# Patient Record
Sex: FEMALE | Race: WHITE | NOT HISPANIC OR LATINO | URBAN - NONMETROPOLITAN AREA
[De-identification: names, ages, dates, MRNs, and addresses within clinical notes are randomized per-mention and may not be internally consistent; named-entity substitution may affect disease eponyms.]

---

## 2021-12-30 PROCEDURE — 88305 TISSUE EXAM BY PATHOLOGIST: CPT | Performed by: SURGERY

## 2022-01-03 ENCOUNTER — LAB REQUISITION (OUTPATIENT)
Dept: LAB | Facility: HOSPITAL | Age: 82
End: 2022-01-03

## 2022-01-03 DIAGNOSIS — Z00.00 ENCOUNTER FOR GENERAL ADULT MEDICAL EXAMINATION WITHOUT ABNORMAL FINDINGS: ICD-10-CM

## 2022-01-05 LAB
CYTO UR: NORMAL
LAB AP CASE REPORT: NORMAL
LAB AP CLINICAL INFORMATION: NORMAL
PATH REPORT.FINAL DX SPEC: NORMAL
PATH REPORT.GROSS SPEC: NORMAL

## 2023-09-21 ENCOUNTER — APPOINTMENT (OUTPATIENT)
Dept: CT IMAGING | Facility: HOSPITAL | Age: 83
DRG: 558 | End: 2023-09-21
Payer: MEDICARE

## 2023-09-21 ENCOUNTER — APPOINTMENT (OUTPATIENT)
Dept: ULTRASOUND IMAGING | Facility: HOSPITAL | Age: 83
DRG: 558 | End: 2023-09-21
Payer: MEDICARE

## 2023-09-21 ENCOUNTER — HOSPITAL ENCOUNTER (INPATIENT)
Facility: HOSPITAL | Age: 83
LOS: 4 days | Discharge: HOME HEALTH | DRG: 558 | End: 2023-09-27
Attending: STUDENT IN AN ORGANIZED HEALTH CARE EDUCATION/TRAINING PROGRAM | Admitting: FAMILY MEDICINE
Payer: MEDICARE

## 2023-09-21 DIAGNOSIS — Z74.09 IMPAIRED FUNCTIONAL MOBILITY, BALANCE, GAIT, AND ENDURANCE: ICD-10-CM

## 2023-09-21 DIAGNOSIS — Z78.9 IMPAIRED MOBILITY AND ADLS: ICD-10-CM

## 2023-09-21 DIAGNOSIS — S22.080A COMPRESSION FRACTURE OF T12 VERTEBRA, INITIAL ENCOUNTER: ICD-10-CM

## 2023-09-21 DIAGNOSIS — T79.6XXA TRAUMATIC RHABDOMYOLYSIS, INITIAL ENCOUNTER: Primary | ICD-10-CM

## 2023-09-21 DIAGNOSIS — Z74.09 IMPAIRED MOBILITY AND ADLS: ICD-10-CM

## 2023-09-21 PROBLEM — M62.82 RHABDOMYOLYSIS: Status: ACTIVE | Noted: 2023-09-21

## 2023-09-21 LAB
ALBUMIN SERPL-MCNC: 2.7 G/DL (ref 3.5–5.2)
ALBUMIN/GLOB SERPL: 0.8 G/DL
ALP SERPL-CCNC: 65 U/L (ref 39–117)
ALT SERPL W P-5'-P-CCNC: 41 U/L (ref 1–33)
ANION GAP SERPL CALCULATED.3IONS-SCNC: 15 MMOL/L (ref 5–15)
AST SERPL-CCNC: 72 U/L (ref 1–32)
B PARAPERT DNA SPEC QL NAA+PROBE: NOT DETECTED
B PERT DNA SPEC QL NAA+PROBE: NOT DETECTED
BACTERIA UR QL AUTO: ABNORMAL /HPF
BASOPHILS # BLD AUTO: 0.06 10*3/MM3 (ref 0–0.2)
BASOPHILS NFR BLD AUTO: 0.4 % (ref 0–1.5)
BILIRUB SERPL-MCNC: 0.6 MG/DL (ref 0–1.2)
BILIRUB UR QL STRIP: NEGATIVE
BUN SERPL-MCNC: 31 MG/DL (ref 8–23)
BUN/CREAT SERPL: 23.3 (ref 7–25)
C PNEUM DNA NPH QL NAA+NON-PROBE: NOT DETECTED
CALCIUM SPEC-SCNC: 8.3 MG/DL (ref 8.6–10.5)
CHLORIDE SERPL-SCNC: 104 MMOL/L (ref 98–107)
CK SERPL-CCNC: 2264 U/L (ref 20–180)
CLARITY UR: ABNORMAL
CO2 SERPL-SCNC: 21 MMOL/L (ref 22–29)
COLOR UR: YELLOW
CREAT SERPL-MCNC: 1.33 MG/DL (ref 0.57–1)
D-LACTATE SERPL-SCNC: 2 MMOL/L (ref 0.5–2)
DEPRECATED RDW RBC AUTO: 39.9 FL (ref 37–54)
EGFRCR SERPLBLD CKD-EPI 2021: 40 ML/MIN/1.73
EOSINOPHIL # BLD AUTO: 0.04 10*3/MM3 (ref 0–0.4)
EOSINOPHIL NFR BLD AUTO: 0.3 % (ref 0.3–6.2)
ERYTHROCYTE [DISTWIDTH] IN BLOOD BY AUTOMATED COUNT: 13.6 % (ref 12.3–15.4)
FLUAV SUBTYP SPEC NAA+PROBE: NOT DETECTED
FLUBV RNA ISLT QL NAA+PROBE: NOT DETECTED
GLOBULIN UR ELPH-MCNC: 3.4 GM/DL
GLUCOSE SERPL-MCNC: 122 MG/DL (ref 65–99)
GLUCOSE UR STRIP-MCNC: NEGATIVE MG/DL
HADV DNA SPEC NAA+PROBE: NOT DETECTED
HCOV 229E RNA SPEC QL NAA+PROBE: NOT DETECTED
HCOV HKU1 RNA SPEC QL NAA+PROBE: NOT DETECTED
HCOV NL63 RNA SPEC QL NAA+PROBE: NOT DETECTED
HCOV OC43 RNA SPEC QL NAA+PROBE: NOT DETECTED
HCT VFR BLD AUTO: 31 % (ref 34–46.6)
HGB BLD-MCNC: 10.7 G/DL (ref 12–15.9)
HGB UR QL STRIP.AUTO: ABNORMAL
HMPV RNA NPH QL NAA+NON-PROBE: NOT DETECTED
HPIV1 RNA ISLT QL NAA+PROBE: NOT DETECTED
HPIV2 RNA SPEC QL NAA+PROBE: NOT DETECTED
HPIV3 RNA NPH QL NAA+PROBE: NOT DETECTED
HPIV4 P GENE NPH QL NAA+PROBE: NOT DETECTED
HYALINE CASTS UR QL AUTO: ABNORMAL /LPF
IMM GRANULOCYTES # BLD AUTO: 0.18 10*3/MM3 (ref 0–0.05)
IMM GRANULOCYTES NFR BLD AUTO: 1.2 % (ref 0–0.5)
KETONES UR QL STRIP: ABNORMAL
LEUKOCYTE ESTERASE UR QL STRIP.AUTO: ABNORMAL
LYMPHOCYTES # BLD AUTO: 2.27 10*3/MM3 (ref 0.7–3.1)
LYMPHOCYTES NFR BLD AUTO: 14.7 % (ref 19.6–45.3)
M PNEUMO IGG SER IA-ACNC: NOT DETECTED
MAGNESIUM SERPL-MCNC: 1.9 MG/DL (ref 1.6–2.4)
MCH RBC QN AUTO: 27.9 PG (ref 26.6–33)
MCHC RBC AUTO-ENTMCNC: 34.5 G/DL (ref 31.5–35.7)
MCV RBC AUTO: 80.7 FL (ref 79–97)
MONOCYTES # BLD AUTO: 1.31 10*3/MM3 (ref 0.1–0.9)
MONOCYTES NFR BLD AUTO: 8.5 % (ref 5–12)
NEUTROPHILS NFR BLD AUTO: 11.63 10*3/MM3 (ref 1.7–7)
NEUTROPHILS NFR BLD AUTO: 74.9 % (ref 42.7–76)
NITRITE UR QL STRIP: NEGATIVE
NRBC BLD AUTO-RTO: 0 /100 WBC (ref 0–0.2)
NT-PROBNP SERPL-MCNC: 2180 PG/ML (ref 0–1800)
PH UR STRIP.AUTO: 5.5 [PH] (ref 5–9)
PLATELET # BLD AUTO: 419 10*3/MM3 (ref 140–450)
PMV BLD AUTO: 8.7 FL (ref 6–12)
POTASSIUM SERPL-SCNC: 3.4 MMOL/L (ref 3.5–5.2)
PROT SERPL-MCNC: 6.1 G/DL (ref 6–8.5)
PROT UR QL STRIP: ABNORMAL
RBC # BLD AUTO: 3.84 10*6/MM3 (ref 3.77–5.28)
RBC # UR STRIP: ABNORMAL /HPF
REF LAB TEST METHOD: ABNORMAL
RHINOVIRUS RNA SPEC NAA+PROBE: NOT DETECTED
RSV RNA NPH QL NAA+NON-PROBE: NOT DETECTED
SARS-COV-2 RNA NPH QL NAA+NON-PROBE: NOT DETECTED
SODIUM SERPL-SCNC: 140 MMOL/L (ref 136–145)
SP GR UR STRIP: 1.04 (ref 1–1.03)
SQUAMOUS #/AREA URNS HPF: ABNORMAL /HPF
TROPONIN T SERPL HS-MCNC: 44 NG/L
UROBILINOGEN UR QL STRIP: ABNORMAL
WBC # UR STRIP: ABNORMAL /HPF
WBC NRBC COR # BLD: 15.49 10*3/MM3 (ref 3.4–10.8)

## 2023-09-21 PROCEDURE — 93010 ELECTROCARDIOGRAM REPORT: CPT | Performed by: INTERNAL MEDICINE

## 2023-09-21 PROCEDURE — 71260 CT THORAX DX C+: CPT

## 2023-09-21 PROCEDURE — 36415 COLL VENOUS BLD VENIPUNCTURE: CPT | Performed by: STUDENT IN AN ORGANIZED HEALTH CARE EDUCATION/TRAINING PROGRAM

## 2023-09-21 PROCEDURE — 72128 CT CHEST SPINE W/O DYE: CPT

## 2023-09-21 PROCEDURE — 0202U NFCT DS 22 TRGT SARS-COV-2: CPT | Performed by: FAMILY MEDICINE

## 2023-09-21 PROCEDURE — 81001 URINALYSIS AUTO W/SCOPE: CPT | Performed by: STUDENT IN AN ORGANIZED HEALTH CARE EDUCATION/TRAINING PROGRAM

## 2023-09-21 PROCEDURE — 87086 URINE CULTURE/COLONY COUNT: CPT

## 2023-09-21 PROCEDURE — G0378 HOSPITAL OBSERVATION PER HR: HCPCS

## 2023-09-21 PROCEDURE — 74177 CT ABD & PELVIS W/CONTRAST: CPT

## 2023-09-21 PROCEDURE — 82550 ASSAY OF CK (CPK): CPT | Performed by: STUDENT IN AN ORGANIZED HEALTH CARE EDUCATION/TRAINING PROGRAM

## 2023-09-21 PROCEDURE — 70450 CT HEAD/BRAIN W/O DYE: CPT

## 2023-09-21 PROCEDURE — 70486 CT MAXILLOFACIAL W/O DYE: CPT

## 2023-09-21 PROCEDURE — 83605 ASSAY OF LACTIC ACID: CPT | Performed by: STUDENT IN AN ORGANIZED HEALTH CARE EDUCATION/TRAINING PROGRAM

## 2023-09-21 PROCEDURE — 93005 ELECTROCARDIOGRAM TRACING: CPT | Performed by: STUDENT IN AN ORGANIZED HEALTH CARE EDUCATION/TRAINING PROGRAM

## 2023-09-21 PROCEDURE — 83735 ASSAY OF MAGNESIUM: CPT | Performed by: STUDENT IN AN ORGANIZED HEALTH CARE EDUCATION/TRAINING PROGRAM

## 2023-09-21 PROCEDURE — 87040 BLOOD CULTURE FOR BACTERIA: CPT

## 2023-09-21 PROCEDURE — 72131 CT LUMBAR SPINE W/O DYE: CPT

## 2023-09-21 PROCEDURE — 93880 EXTRACRANIAL BILAT STUDY: CPT

## 2023-09-21 PROCEDURE — 72125 CT NECK SPINE W/O DYE: CPT

## 2023-09-21 PROCEDURE — 83880 ASSAY OF NATRIURETIC PEPTIDE: CPT | Performed by: STUDENT IN AN ORGANIZED HEALTH CARE EDUCATION/TRAINING PROGRAM

## 2023-09-21 PROCEDURE — 25510000001 IOPAMIDOL 61 % SOLUTION: Performed by: STUDENT IN AN ORGANIZED HEALTH CARE EDUCATION/TRAINING PROGRAM

## 2023-09-21 PROCEDURE — 99285 EMERGENCY DEPT VISIT HI MDM: CPT

## 2023-09-21 PROCEDURE — 85025 COMPLETE CBC W/AUTO DIFF WBC: CPT | Performed by: STUDENT IN AN ORGANIZED HEALTH CARE EDUCATION/TRAINING PROGRAM

## 2023-09-21 PROCEDURE — 80053 COMPREHEN METABOLIC PANEL: CPT | Performed by: STUDENT IN AN ORGANIZED HEALTH CARE EDUCATION/TRAINING PROGRAM

## 2023-09-21 PROCEDURE — 84484 ASSAY OF TROPONIN QUANT: CPT | Performed by: STUDENT IN AN ORGANIZED HEALTH CARE EDUCATION/TRAINING PROGRAM

## 2023-09-21 PROCEDURE — 25010000002 HEPARIN (PORCINE) PER 1000 UNITS

## 2023-09-21 RX ORDER — ONDANSETRON 2 MG/ML
4 INJECTION INTRAMUSCULAR; INTRAVENOUS EVERY 6 HOURS PRN
Status: DISCONTINUED | OUTPATIENT
Start: 2023-09-21 | End: 2023-09-27 | Stop reason: HOSPADM

## 2023-09-21 RX ORDER — AMLODIPINE BESYLATE 10 MG/1
10 TABLET ORAL
Status: DISCONTINUED | OUTPATIENT
Start: 2023-09-21 | End: 2023-09-27 | Stop reason: HOSPADM

## 2023-09-21 RX ORDER — LISINOPRIL 40 MG/1
40 TABLET ORAL DAILY
COMMUNITY

## 2023-09-21 RX ORDER — FAMOTIDINE 40 MG/1
40 TABLET, FILM COATED ORAL DAILY
Status: DISCONTINUED | OUTPATIENT
Start: 2023-09-21 | End: 2023-09-27 | Stop reason: HOSPADM

## 2023-09-21 RX ORDER — SODIUM CHLORIDE 0.9 % (FLUSH) 0.9 %
10 SYRINGE (ML) INJECTION AS NEEDED
Status: DISCONTINUED | OUTPATIENT
Start: 2023-09-21 | End: 2023-09-27 | Stop reason: HOSPADM

## 2023-09-21 RX ORDER — SODIUM CHLORIDE 0.9 % (FLUSH) 0.9 %
10 SYRINGE (ML) INJECTION EVERY 12 HOURS SCHEDULED
Status: DISCONTINUED | OUTPATIENT
Start: 2023-09-21 | End: 2023-09-27 | Stop reason: HOSPADM

## 2023-09-21 RX ORDER — ONDANSETRON 4 MG/1
4 TABLET, FILM COATED ORAL EVERY 6 HOURS PRN
Status: DISCONTINUED | OUTPATIENT
Start: 2023-09-21 | End: 2023-09-27 | Stop reason: HOSPADM

## 2023-09-21 RX ORDER — NITROGLYCERIN 0.4 MG/1
0.4 TABLET SUBLINGUAL
Status: DISCONTINUED | OUTPATIENT
Start: 2023-09-21 | End: 2023-09-27 | Stop reason: HOSPADM

## 2023-09-21 RX ORDER — AMOXICILLIN 250 MG
2 CAPSULE ORAL 2 TIMES DAILY
Status: DISCONTINUED | OUTPATIENT
Start: 2023-09-21 | End: 2023-09-27 | Stop reason: HOSPADM

## 2023-09-21 RX ORDER — SODIUM CHLORIDE 9 MG/ML
100 INJECTION, SOLUTION INTRAVENOUS CONTINUOUS
Status: DISCONTINUED | OUTPATIENT
Start: 2023-09-21 | End: 2023-09-22

## 2023-09-21 RX ORDER — BISACODYL 10 MG
10 SUPPOSITORY, RECTAL RECTAL DAILY PRN
Status: DISCONTINUED | OUTPATIENT
Start: 2023-09-21 | End: 2023-09-27 | Stop reason: HOSPADM

## 2023-09-21 RX ORDER — DILTIAZEM HYDROCHLORIDE 5 MG/ML
10 INJECTION INTRAVENOUS ONCE
Status: COMPLETED | OUTPATIENT
Start: 2023-09-21 | End: 2023-09-21

## 2023-09-21 RX ORDER — POLYETHYLENE GLYCOL 3350 17 G/17G
17 POWDER, FOR SOLUTION ORAL DAILY PRN
Status: DISCONTINUED | OUTPATIENT
Start: 2023-09-21 | End: 2023-09-27 | Stop reason: HOSPADM

## 2023-09-21 RX ORDER — HEPARIN SODIUM 5000 [USP'U]/ML
5000 INJECTION, SOLUTION INTRAVENOUS; SUBCUTANEOUS EVERY 12 HOURS SCHEDULED
Status: DISCONTINUED | OUTPATIENT
Start: 2023-09-21 | End: 2023-09-22

## 2023-09-21 RX ORDER — POTASSIUM CHLORIDE 20 MEQ/1
40 TABLET, EXTENDED RELEASE ORAL EVERY 4 HOURS
Status: COMPLETED | OUTPATIENT
Start: 2023-09-21 | End: 2023-09-22

## 2023-09-21 RX ORDER — BISACODYL 5 MG/1
5 TABLET, DELAYED RELEASE ORAL DAILY PRN
Status: DISCONTINUED | OUTPATIENT
Start: 2023-09-21 | End: 2023-09-27 | Stop reason: HOSPADM

## 2023-09-21 RX ORDER — SODIUM CHLORIDE 9 MG/ML
40 INJECTION, SOLUTION INTRAVENOUS AS NEEDED
Status: DISCONTINUED | OUTPATIENT
Start: 2023-09-21 | End: 2023-09-27 | Stop reason: HOSPADM

## 2023-09-21 RX ADMIN — AMLODIPINE BESYLATE 10 MG: 10 TABLET ORAL at 20:33

## 2023-09-21 RX ADMIN — POTASSIUM CHLORIDE 40 MEQ: 20 TABLET, EXTENDED RELEASE ORAL at 21:13

## 2023-09-21 RX ADMIN — DILTIAZEM HYDROCHLORIDE 10 MG: 5 INJECTION INTRAVENOUS at 13:27

## 2023-09-21 RX ADMIN — SODIUM CHLORIDE 5 MG/HR: 900 INJECTION, SOLUTION INTRAVENOUS at 16:56

## 2023-09-21 RX ADMIN — SODIUM CHLORIDE 1000 ML: 9 INJECTION, SOLUTION INTRAVENOUS at 16:54

## 2023-09-21 RX ADMIN — FAMOTIDINE 40 MG: 40 TABLET, FILM COATED ORAL at 20:33

## 2023-09-21 RX ADMIN — IOPAMIDOL 100 ML: 612 INJECTION, SOLUTION INTRAVENOUS at 15:54

## 2023-09-21 RX ADMIN — HEPARIN SODIUM 5000 UNITS: 5000 INJECTION INTRAVENOUS; SUBCUTANEOUS at 20:34

## 2023-09-21 RX ADMIN — SODIUM CHLORIDE 100 ML/HR: 9 INJECTION, SOLUTION INTRAVENOUS at 20:31

## 2023-09-21 RX ADMIN — SODIUM CHLORIDE 1000 ML: 9 INJECTION, SOLUTION INTRAVENOUS at 13:25

## 2023-09-21 NOTE — ED PROVIDER NOTES
Subjective   History of Present Illness  83-year-old male comes to the ER from home after being found on the floor of her bathroom.  Patient does not remember what happened.  Family thinks she was on the ground for 2 nights.  She is complaining of left hip pain.     History provided by:  Patient and relative  History limited by: Poor historian.   used: No      Review of Systems   Reason unable to perform ROS: Poor historian.   Musculoskeletal:  Negative for back pain and neck pain.   Neurological:  Negative for weakness, numbness and headaches.     Past Medical History:   Diagnosis Date    Diabetes mellitus     Hypertension     Stroke        No Known Allergies    Past Surgical History:   Procedure Laterality Date    HYSTERECTOMY         History reviewed. No pertinent family history.    Social History     Socioeconomic History    Marital status:    Tobacco Use    Smoking status: Former     Types: Cigarettes    Smokeless tobacco: Never   Substance and Sexual Activity    Alcohol use: Never    Drug use: Never    Sexual activity: Not Currently           Objective   Vitals:    09/21/23 1339 09/21/23 1412 09/21/23 1525 09/21/23 1655   BP:  147/81  156/83   BP Location:  Left arm  Left arm   Patient Position:  Lying  Lying   Pulse: 116 118 110 117   Resp:  20  20   Temp:       TempSrc:       SpO2: 100% 100% 99% 100%   Weight:       Height:           Physical Exam  Vitals and nursing note reviewed.   Constitutional:       General: She is not in acute distress.     Appearance: She is well-developed. She is not ill-appearing, toxic-appearing or diaphoretic.   Pulmonary:      Effort: Pulmonary effort is normal. No accessory muscle usage or respiratory distress.   Chest:      Chest wall: No tenderness.   Abdominal:      Palpations: Abdomen is soft.      Tenderness: There is no abdominal tenderness (deep palpation). There is no guarding or rebound.   Musculoskeletal:         General: Tenderness present.  No swelling, deformity or signs of injury. Normal range of motion.      Cervical back: No tenderness or bony tenderness.      Thoracic back: No tenderness or bony tenderness.      Lumbar back: No tenderness or bony tenderness.   Skin:     General: Skin is warm and dry.      Capillary Refill: Capillary refill takes less than 2 seconds.      Findings: Bruising and ecchymosis present.   Neurological:      Mental Status: She is alert and oriented to person, place, and time.      Sensory: No sensory deficit.      Coordination: Coordination normal.       ECG 12 Lead      Date/Time: 9/21/2023 5:10 PM  Performed by: Reji Lopez MD  Authorized by: Reji Lopez MD   Interpreted by physician  Rhythm: atrial fibrillation  Rate: tachycardic  BPM: 114  QRS axis: normal  ST segment elevation noted on lead: none.  Clinical impression: abnormal ECG             ED Course      Results for orders placed or performed during the hospital encounter of 09/21/23   Comprehensive Metabolic Panel    Specimen: Blood   Result Value Ref Range    Glucose 122 (H) 65 - 99 mg/dL    BUN 31 (H) 8 - 23 mg/dL    Creatinine 1.33 (H) 0.57 - 1.00 mg/dL    Sodium 140 136 - 145 mmol/L    Potassium 3.4 (L) 3.5 - 5.2 mmol/L    Chloride 104 98 - 107 mmol/L    CO2 21.0 (L) 22.0 - 29.0 mmol/L    Calcium 8.3 (L) 8.6 - 10.5 mg/dL    Total Protein 6.1 6.0 - 8.5 g/dL    Albumin 2.7 (L) 3.5 - 5.2 g/dL    ALT (SGPT) 41 (H) 1 - 33 U/L    AST (SGOT) 72 (H) 1 - 32 U/L    Alkaline Phosphatase 65 39 - 117 U/L    Total Bilirubin 0.6 0.0 - 1.2 mg/dL    Globulin 3.4 gm/dL    A/G Ratio 0.8 g/dL    BUN/Creatinine Ratio 23.3 7.0 - 25.0    Anion Gap 15.0 5.0 - 15.0 mmol/L    eGFR 40.0 (L) >60.0 mL/min/1.73   BNP    Specimen: Blood   Result Value Ref Range    proBNP 2,180.0 (H) 0.0 - 1,800.0 pg/mL   Single High Sensitivity Troponin T    Specimen: Blood   Result Value Ref Range    HS Troponin T 44 (H) <10 ng/L   Lactic Acid, Plasma    Specimen: Blood   Result Value Ref  Range    Lactate 2.0 0.5 - 2.0 mmol/L   CK    Specimen: Blood   Result Value Ref Range    Creatine Kinase 2,264 (H) 20 - 180 U/L   Magnesium    Specimen: Blood   Result Value Ref Range    Magnesium 1.9 1.6 - 2.4 mg/dL   CBC Auto Differential    Specimen: Blood   Result Value Ref Range    WBC 15.49 (H) 3.40 - 10.80 10*3/mm3    RBC 3.84 3.77 - 5.28 10*6/mm3    Hemoglobin 10.7 (L) 12.0 - 15.9 g/dL    Hematocrit 31.0 (L) 34.0 - 46.6 %    MCV 80.7 79.0 - 97.0 fL    MCH 27.9 26.6 - 33.0 pg    MCHC 34.5 31.5 - 35.7 g/dL    RDW 13.6 12.3 - 15.4 %    RDW-SD 39.9 37.0 - 54.0 fl    MPV 8.7 6.0 - 12.0 fL    Platelets 419 140 - 450 10*3/mm3    Neutrophil % 74.9 42.7 - 76.0 %    Lymphocyte % 14.7 (L) 19.6 - 45.3 %    Monocyte % 8.5 5.0 - 12.0 %    Eosinophil % 0.3 0.3 - 6.2 %    Basophil % 0.4 0.0 - 1.5 %    Immature Grans % 1.2 (H) 0.0 - 0.5 %    Neutrophils, Absolute 11.63 (H) 1.70 - 7.00 10*3/mm3    Lymphocytes, Absolute 2.27 0.70 - 3.10 10*3/mm3    Monocytes, Absolute 1.31 (H) 0.10 - 0.90 10*3/mm3    Eosinophils, Absolute 0.04 0.00 - 0.40 10*3/mm3    Basophils, Absolute 0.06 0.00 - 0.20 10*3/mm3    Immature Grans, Absolute 0.18 (H) 0.00 - 0.05 10*3/mm3    nRBC 0.0 0.0 - 0.2 /100 WBC   ECG 12 Lead Other; fall   Result Value Ref Range    QT Interval 330 ms    QTC Interval 454 ms     CT Chest With Contrast Diagnostic   Preliminary Result   1.  Probable acute on chronic superior endplate fracture of T12.  If there is   further need to date the fracture, MRI can be obtained to assess for the   presence of edema.   2.  No additional acute process within the thoracic spine.            CT Abdomen Pelvis With Contrast   Preliminary Result   Negative for acute trauma within the abdomen or pelvis.         CT Thoracic Spine Without Contrast   Preliminary Result   1.  Probable acute on chronic superior endplate fracture of T12.  If there is   further need to date the fracture, MRI can be obtained to assess for the   presence of edema.    2.  No additional compression deformities are seen within the thoracic spine.            CT Lumbar Spine Without Contrast   Preliminary Result   Acute on chronic appearing superior endplate compression deformity   of T12.  No evidence of fracture within the lumbar spine.            CT Head Without Contrast   Final Result      CT Maxillofacial Without Contrast   Final Result      CT Cervical Spine Without Contrast   Final Result                                             Medical Decision Making  Vital signs are stable, afebrile.  Labs obtained significant for CK of 2300, creatinine of 1.3, troponin of 44.  Man scan CT imaging obtained which is negative except for an acute on chronic mild T12 fracture.  Brace ordered.  EKG shows A-fib RVR which the patient does not have a history of.  She is on a diltiazem drip.  IVF bolus was given.  Discussed with the on-call hospitalist who agrees to admit.    Problems Addressed:  Compression fracture of T12 vertebra, initial encounter: complicated acute illness or injury  Traumatic rhabdomyolysis, initial encounter: complicated acute illness or injury    Amount and/or Complexity of Data Reviewed  Labs: ordered.  Radiology: ordered.  ECG/medicine tests: ordered and independent interpretation performed.    Risk  Prescription drug management.  Decision regarding hospitalization.        Final diagnoses:   Traumatic rhabdomyolysis, initial encounter   Compression fracture of T12 vertebra, initial encounter       ED Disposition  ED Disposition       ED Disposition   Decision to Admit    Condition   --    Comment   Level of Care: Stepdown [25]   Diagnosis: Rhabdomyolysis [728.88.ICD-9-CM]   Admitting Physician: STACY KAUR [[id number]]   Attending Physician: STACY KAUR [[id number]]                 No follow-up provider specified.       Medication List      No changes were made to your prescriptions during this visit.            Reji Lopez MD  09/21/23 8236

## 2023-09-21 NOTE — ED NOTES
Nursing report ED to floor  Eloisa Chang  83 y.o.  female    HPI:   Chief Complaint   Patient presents with    Fall    Hip Injury    Knee Pain       Admitting doctor:   Alex Salcedo MD    Consulting provider(s):  Consults       No orders found from 8/23/2023 to 9/22/2023.             Admitting diagnosis:   The primary encounter diagnosis was Traumatic rhabdomyolysis, initial encounter. A diagnosis of Compression fracture of T12 vertebra, initial encounter was also pertinent to this visit.    Code status:   Current Code Status       Date Active Code Status Order ID Comments User Context       Not on file            Allergies:   Patient has no known allergies.    Intake and Output    Intake/Output Summary (Last 24 hours) at 9/21/2023 1748  Last data filed at 9/21/2023 1548  Gross per 24 hour   Intake 1000 ml   Output --   Net 1000 ml       Weight:       09/21/23  1214   Weight: 66.7 kg (147 lb)       Most recent vitals:   Vitals:    09/21/23 1412 09/21/23 1525 09/21/23 1655 09/21/23 1730   BP: 147/81  156/83 170/90   BP Location: Left arm  Left arm    Patient Position: Lying  Lying    Pulse: 118 110 117 106   Resp: 20  20    Temp:       TempSrc:       SpO2: 100% 99% 100% 100%   Weight:       Height:         Oxygen Therapy: Room air     Active LDAs/IV Access:   Lines, Drains & Airways       Active LDAs       Name Placement date Placement time Site Days    Peripheral IV 09/21/23 1321 Right;Lower Forearm 09/21/23  1321  Forearm  less than 1                    Labs (abnormal labs have a star):   Labs Reviewed   COMPREHENSIVE METABOLIC PANEL - Abnormal; Notable for the following components:       Result Value    Glucose 122 (*)     BUN 31 (*)     Creatinine 1.33 (*)     Potassium 3.4 (*)     CO2 21.0 (*)     Calcium 8.3 (*)     Albumin 2.7 (*)     ALT (SGPT) 41 (*)     AST (SGOT) 72 (*)     eGFR 40.0 (*)     All other components within normal limits    Narrative:     GFR Normal >60  Chronic Kidney Disease  <60  Kidney Failure <15    The GFR formula is only valid for adults with stable renal function between ages 18 and 70.   BNP (IN-HOUSE) - Abnormal; Notable for the following components:    proBNP 2,180.0 (*)     All other components within normal limits    Narrative:     Among patients with dyspnea, NT-proBNP is highly sensitive for the detection of acute congestive heart failure. In addition NT-proBNP of <300 pg/ml effectively rules out acute congestive heart failure with 99% negative predictive value.     SINGLE HSTROPONIN T - Abnormal; Notable for the following components:    HS Troponin T 44 (*)     All other components within normal limits    Narrative:     High Sensitive Troponin T Reference Range:  <10.0 ng/L- Negative Female for AMI  <15.0 ng/L- Negative Male for AMI  >=10 - Abnormal Female indicating possible myocardial injury.  >=15 - Abnormal Male indicating possible myocardial injury.   Clinicians would have to utilize clinical acumen, EKG, Troponin, and serial changes to determine if it is an Acute Myocardial Infarction or myocardial injury due to an underlying chronic condition.        CK - Abnormal; Notable for the following components:    Creatine Kinase 2,264 (*)     All other components within normal limits   CBC WITH AUTO DIFFERENTIAL - Abnormal; Notable for the following components:    WBC 15.49 (*)     Hemoglobin 10.7 (*)     Hematocrit 31.0 (*)     Lymphocyte % 14.7 (*)     Immature Grans % 1.2 (*)     Neutrophils, Absolute 11.63 (*)     Monocytes, Absolute 1.31 (*)     Immature Grans, Absolute 0.18 (*)     All other components within normal limits   LACTIC ACID, PLASMA - Normal   MAGNESIUM - Normal   URINALYSIS W/ MICROSCOPIC IF INDICATED (NO CULTURE)   CBC AND DIFFERENTIAL    Narrative:     The following orders were created for panel order CBC & Differential.  Procedure                               Abnormality         Status                     ---------                                -----------         ------                     CBC Auto Differential[[id number]]        Abnormal            Final result                 Please view results for these tests on the individual orders.       Meds given in ED:   Medications   dilTIAZem (CARDIZEM) 100 mg in 100 mL NS infusion (ADV) (5 mg/hr Intravenous New Bag 9/21/23 1656)   sodium chloride 0.9 % bolus 1,000 mL (0 mL Intravenous Stopped 9/21/23 1548)   dilTIAZem (CARDIZEM) injection 10 mg (10 mg Intravenous Given 9/21/23 1327)   sodium chloride 0.9 % bolus 1,000 mL (1,000 mL Intravenous New Bag 9/21/23 1654)   iopamidol (ISOVUE-300) 61 % injection 100 mL (100 mL Intravenous Given 9/21/23 1554)     dilTIAZem, 5-15 mg/hr, Last Rate: 5 mg/hr (09/21/23 1656)         NIH Stroke Scale:       Isolation/Infection(s):  No active isolations   No active infections     COVID Testing  Collected No  Resulted N/A    Nursing report ED to floor:  Mental status: AOx4  Ambulatory status: Assist x2  Precautions: Fall    ED nurse phone [phone]

## 2023-09-21 NOTE — H&P
[facility] Medicine Admission      Date of Admission: 9/21/2023      Primary Care Physician: Provider, No Known      Chief Complaint: found on the floor in the bathroom    HPI:83-year-old male comes to the ER from home after being found on the floor of her bathroom. Spoke to her sons Bruce and Bao, they found her on the bathroom floor, maybe after 36 hours.  Patient does not remember what happened, now more oriented and denies chest pain, no shortness of breath, no fever, no urinary or GI symptoms. Family thinks she was on the ground for 2 nights. She is complaining of left hip pain. PMH: Type 2 diabetes, hyperlipidemia, essential hypertension, osteoporosis.  Past Medical History:   Diagnosis Date    Diabetes mellitus     Hypertension     Stroke       Past Surgical History:   Procedure Laterality Date    HYSTERECTOMY        Social History     Socioeconomic History    Marital status:    Tobacco Use    Smoking status: Former     Types: Cigarettes    Smokeless tobacco: Never   Substance and Sexual Activity    Alcohol use: Never    Drug use: Never    Sexual activity: Not Currently    History reviewed. No pertinent family history.   Prior to Admission medications    Medication Sig Start Date End Date Taking? Authorizing Provider   lisinopril (PRINIVIL,ZESTRIL) 40 MG tablet Take 1 tablet by mouth Daily.    Provider, MD Patti      Concurrent Medical History:  has a past medical history of Diabetes mellitus, Hypertension, and Stroke.    Past Surgical History:  has a past surgical history that includes Hysterectomy.    Family History: family history is not on file.     Social History:  reports that she has quit smoking. Her smoking use included cigarettes. She has never used smokeless tobacco. She reports that she does not drink alcohol and does not use drugs.    Allergies: No Known Allergies    Medications:   Prior to Admission medications    Medication  Sig Start Date End Date Taking? Authorizing Provider   lisinopril (PRINIVIL,ZESTRIL) 40 MG tablet Take 1 tablet by mouth Daily.    Provider, MD Patti     I have utilized all available immediate resources to obtain, update, or review the patient's current medications (including all prescriptions, over-the-counter products, herbals, cannabis/cannabidiol products, and vitamin/mineral/dietary (nutritional) supplements).      Review of Systems:  Review of Systems   Constitutional:  Negative for activity change, appetite change, chills, diaphoresis and fatigue.   HENT:  Negative for congestion, ear discharge, hearing loss, rhinorrhea, sinus pain, sneezing and sore throat.    Eyes:  Negative for photophobia, discharge and visual disturbance.   Respiratory:  Negative for cough, chest tightness, shortness of breath and wheezing.    Cardiovascular:  Negative for chest pain and palpitations.   Gastrointestinal:  Negative for abdominal distention, abdominal pain, blood in stool, diarrhea, nausea and vomiting.   Endocrine: Negative for cold intolerance, heat intolerance, polydipsia, polyphagia and polyuria.   Genitourinary:  Negative for dysuria, flank pain, hematuria and urgency.   Musculoskeletal:  Negative for arthralgias, joint swelling and myalgias.   Skin:  Negative for color change.   Allergic/Immunologic: Negative for food allergies.   Neurological:  Negative for dizziness, seizures, syncope, speech difficulty, weakness and headaches.   Hematological:  Negative for adenopathy. Does not bruise/bleed easily.   Psychiatric/Behavioral:  Negative for confusion, hallucinations, self-injury and suicidal ideas. The patient is not nervous/anxious.     Otherwise complete ROS is negative except as mentioned above.    Physical Exam:   Temp:  [97.7 °F (36.5 °C)] 97.7 °F (36.5 °C)  Heart Rate:  [106-138] 106  Resp:  [20] 20  BP: (147-170)/(81-90) 170/90  Physical Exam  Constitutional:       Appearance: Normal appearance.   HENT:       Head: Normocephalic and atraumatic.      Right Ear: Tympanic membrane normal.      Left Ear: Tympanic membrane normal.      Nose: Nose normal.      Mouth/Throat:      Mouth: Mucous membranes are moist.   Eyes:      Pupils: Pupils are equal, round, and reactive to light.   Cardiovascular:      Rate and Rhythm: Normal rate and regular rhythm.      Pulses: Normal pulses.      Heart sounds: Normal heart sounds.   Pulmonary:      Effort: Pulmonary effort is normal.      Breath sounds: Normal breath sounds.   Abdominal:      General: Abdomen is flat. Bowel sounds are normal.      Palpations: Abdomen is soft.   Musculoskeletal:         General: Normal range of motion.      Cervical back: Normal range of motion and neck supple.   Skin:     General: Skin is warm and dry.      Capillary Refill: Capillary refill takes less than 2 seconds.      Comments: Bruises in hips, knees and forehead   Neurological:      General: No focal deficit present.      Mental Status: She is alert and oriented to person, place, and time.   Psychiatric:         Mood and Affect: Mood normal.         Behavior: Behavior normal.         Thought Content: Thought content normal.         Judgment: Judgment normal.         Results Reviewed:  I have personally reviewed current lab, radiology, and data and agree with results.  Lab Results (last 24 hours)       Procedure Component Value Units Date/Time    CK [[id number]]  (Abnormal) Collected: 09/21/23 1421    Specimen: Blood Updated: 09/21/23 1506     Creatine Kinase 2,264 U/L     Comprehensive Metabolic Panel [[id number]]  (Abnormal) Collected: 09/21/23 1421    Specimen: Blood Updated: 09/21/23 1451     Glucose 122 mg/dL      BUN 31 mg/dL      Creatinine 1.33 mg/dL      Sodium 140 mmol/L      Potassium 3.4 mmol/L      Chloride 104 mmol/L      CO2 21.0 mmol/L      Calcium 8.3 mg/dL      Total Protein 6.1 g/dL      Albumin 2.7 g/dL      ALT (SGPT) 41 U/L      AST (SGOT) 72 U/L      Alkaline Phosphatase 65  U/L      Total Bilirubin 0.6 mg/dL      Globulin 3.4 gm/dL      A/G Ratio 0.8 g/dL      BUN/Creatinine Ratio 23.3     Anion Gap 15.0 mmol/L      eGFR 40.0 mL/min/1.73     Narrative:      GFR Normal >60  Chronic Kidney Disease <60  Kidney Failure <15    The GFR formula is only valid for adults with stable renal function between ages 18 and 70.    Single High Sensitivity Troponin T [[id number]]  (Abnormal) Collected: 09/21/23 1421    Specimen: Blood Updated: 09/21/23 1451     HS Troponin T 44 ng/L     Narrative:      High Sensitive Troponin T Reference Range:  <10.0 ng/L- Negative Female for AMI  <15.0 ng/L- Negative Male for AMI  >=10 - Abnormal Female indicating possible myocardial injury.  >=15 - Abnormal Male indicating possible myocardial injury.   Clinicians would have to utilize clinical acumen, EKG, Troponin, and serial changes to determine if it is an Acute Myocardial Infarction or myocardial injury due to an underlying chronic condition.         Magnesium [[id number]]  (Normal) Collected: 09/21/23 1421    Specimen: Blood Updated: 09/21/23 1451     Magnesium 1.9 mg/dL     BNP [[id number]]  (Abnormal) Collected: 09/21/23 1421    Specimen: Blood Updated: 09/21/23 1449     proBNP 2,180.0 pg/mL     Narrative:      Among patients with dyspnea, NT-proBNP is highly sensitive for the detection of acute congestive heart failure. In addition NT-proBNP of <300 pg/ml effectively rules out acute congestive heart failure with 99% negative predictive value.      Lactic Acid, Plasma [[id number]]  (Normal) Collected: 09/21/23 1421    Specimen: Blood Updated: 09/21/23 1445     Lactate 2.0 mmol/L     CBC & Differential [[id number]]  (Abnormal) Collected: 09/21/23 1421    Specimen: Blood Updated: 09/21/23 1432    Narrative:      The following orders were created for panel order CBC & Differential.  Procedure                               Abnormality         Status                     ---------                                -----------         ------                     CBC Auto Differential[[id number]]        Abnormal            Final result                 Please view results for these tests on the individual orders.    CBC Auto Differential [[id number]]  (Abnormal) Collected: 09/21/23 1421    Specimen: Blood Updated: 09/21/23 1432     WBC 15.49 10*3/mm3      RBC 3.84 10*6/mm3      Hemoglobin 10.7 g/dL      Hematocrit 31.0 %      MCV 80.7 fL      MCH 27.9 pg      MCHC 34.5 g/dL      RDW 13.6 %      RDW-SD 39.9 fl      MPV 8.7 fL      Platelets 419 10*3/mm3      Neutrophil % 74.9 %      Lymphocyte % 14.7 %      Monocyte % 8.5 %      Eosinophil % 0.3 %      Basophil % 0.4 %      Immature Grans % 1.2 %      Neutrophils, Absolute 11.63 10*3/mm3      Lymphocytes, Absolute 2.27 10*3/mm3      Monocytes, Absolute 1.31 10*3/mm3      Eosinophils, Absolute 0.04 10*3/mm3      Basophils, Absolute 0.06 10*3/mm3      Immature Grans, Absolute 0.18 10*3/mm3      nRBC 0.0 /100 WBC           Imaging Results (Last 24 Hours)       Procedure Component Value Units Date/Time    CT Lumbar Spine Without Contrast [[id number]] Collected: 09/21/23 1646     Updated: 09/21/23 1647    Narrative:      INDICATION:  Trauma.    COMPARISON:  None relevant.    TECHNIQUE:  Helical CT of the lumbar spine was performed without intravenous contrast.  Multiplanar reformations were provided.    FINDINGS:  There is an acute on chronic appearing fracture of T12.  Within the lumbar spine  alignment appears to be intact.  No additional fractures are seen.  Mild  multilevel spondylosis and facet arthrosis.  Diffuse vascular calcification.      Impression:      Acute on chronic appearing superior endplate compression deformity  of T12.  No evidence of fracture within the lumbar spine.        CT Chest With Contrast Diagnostic [[id number]] Collected: 09/21/23 1641     Updated: 09/21/23 1646    Narrative:      INDICATION:  Trauma.    COMPARISON:  None relevant.    TECHNIQUE:  Helical CT of  the chest was performed with intravenous contrast.  Multiplanar  reformations were provided.    FINDINGS:  Cardiac size is not enlarged.  Diffuse vascular calcification.  No pleural  effusion or pneumothorax.  No worrisome pulmonary nodules.  No acute osseous  trauma is visualized.  Chronic right-sided rib fractures.  T12 fracture.      Impression:      1.  Probable acute on chronic superior endplate fracture of T12.  If there is  further need to date the fracture, MRI can be obtained to assess for the  presence of edema.  2.  No additional acute process within the thoracic spine.        CT Thoracic Spine Without Contrast [[id number]] Collected: 09/21/23 1643     Updated: 09/21/23 1645    Narrative:      INDICATION:  Trauma.    COMPARISON:  None relevant.    TECHNIQUE:  Helical CT of the thoracic spine was performed without intravenous contrast.  Multiplanar reformations were provided.    FINDINGS:  There is height loss of T12 which is age indeterminate, but appears to be  chronic.  There may be an acute component.  Remainder of the thoracic spine  shows no acute trauma.  Alignment appears to be intact.  There is multilevel  spondylosis.      Impression:      1.  Probable acute on chronic superior endplate fracture of T12.  If there is  further need to date the fracture, MRI can be obtained to assess for the  presence of edema.  2.  No additional compression deformities are seen within the thoracic spine.        CT Abdomen Pelvis With Contrast [[id number]] Collected: 09/21/23 1642     Updated: 09/21/23 1643    Narrative:      INDICATION:  Trauma.    COMPARISON:  None relevant.    TECHNIQUE:  Helical CT of the abdomen and pelvis was performed with intravenous contrast.  Multiplanar reformations were provided.    FINDINGS:  Lung bases are clear.    Liver, spleen, adrenals, pancreas stomach and kidneys show no acute process.  No  free air or free fluid.  Normal volume of colonic stool.  Cholelithiasis.      Impression:       Negative for acute trauma within the abdomen or pelvis.      CT Cervical Spine Without Contrast [[id number]] Collected: 09/21/23 1612     Updated: 09/21/23 1638    Narrative:      CT CERVICAL SPINE WITHOUT CONTRAST:    INDICATION:  Trauma.    TECHNIQUE:  Axial images from the base of the skull through the thoracic inlet were  performed followed by 2D multiplanar reformats.    FINDINGS:  There is degenerative change and osteopenia.  Study is negative for fracture or  other acute abnormality.    SUMMARY:  No acute findings.    CT Head Without Contrast [[id number]] Collected: 09/21/23 1611     Updated: 09/21/23 1638    Narrative:      CT HEAD WITHOUT CONTRAST:    INDICATION:  Trauma.    TECHNIQUE:  Axial images were performed from the calvarium through the base of the skull  followed by 2D multiplanar reformats.    FINDINGS:  There is age-related atrophy.  No hemorrhage or other acute abnormality seen.  No focal abnormal densities.    SUMMARY:  No acute findings.    CT Maxillofacial Without Contrast [[id number]] Collected: 09/21/23 1613     Updated: 09/21/23 1638    Narrative:      INDICATION:  Trauma.    TECHNIQUE:  Axial images from the base of the skull through the mandible were performed  followed by 2D multiplanar reformats.    FINDINGS:  No fracture or other acute abnormality demonstrated.  Paraspinal sinuses are  clear.    SUMMARY:  Negative.              Assessment:    Active Hospital Problems    Diagnosis     **Rhabdomyolysis              Assessment and Plan: 83 years old male patient found on the floor after 2 days, no syncope, maybe had dizzines, will do complete cardiac work up. Has renal failure, rhabdomyolysis. Hypokalemia. Leucocytosis.  Problems:  Atrial fibrillation, Cardizem drip  Rhabdomyolysis, IVF, am labs  Renal failure, prerenal, IVF  High WBC, to follow, urine and blood cultures  Hypokalemia, electrolyte replacement protocol  Old T12 and right ribs fractures, underlined osteoporosis  Frequent  falls  Medical Decision Making  Number and Complexity of problems: 4  Differential Diagnosis: none    Conditions and Status:        Condition is improving.     MDM Data  External documents reviewed: previous medical records  My EKG interpretation: atrial fibrillation  My CT interpretation: chest right ribs fractures  Tests considered but not ordered: none     Decision rules/scores evaluated (example [id number], Wells, etc): 7     Discussed with: sophie Barrera and Bao Chang     Treatment Plan  See above    Care Planning  Shared decision making: sophie  Code status and discussions: full code    Disposition  Social Determinants of Health that impact treatment or disposition: none  I expect the patient to be discharged to home  in 3 days.      I confirmed that the patient's Advance Care Plan is present, code status is documented, or surrogate decision maker is listed in the patient's medical record.    I discussed the patient's findings and my recommendations with: sophie      This document has been electronically signed by Gabriele Franco MD on September 21, 2023 17:59 CDT

## 2023-09-22 ENCOUNTER — APPOINTMENT (OUTPATIENT)
Dept: GENERAL RADIOLOGY | Facility: HOSPITAL | Age: 83
DRG: 558 | End: 2023-09-22
Payer: MEDICARE

## 2023-09-22 ENCOUNTER — APPOINTMENT (OUTPATIENT)
Dept: CARDIOLOGY | Facility: HOSPITAL | Age: 83
DRG: 558 | End: 2023-09-22
Payer: MEDICARE

## 2023-09-22 LAB
ALBUMIN SERPL-MCNC: 2.9 G/DL (ref 3.5–5.2)
ALBUMIN/GLOB SERPL: 0.9 G/DL
ALP SERPL-CCNC: 64 U/L (ref 39–117)
ALT SERPL W P-5'-P-CCNC: 38 U/L (ref 1–33)
AMYLASE SERPL-CCNC: 66 U/L (ref 28–100)
ANION GAP SERPL CALCULATED.3IONS-SCNC: 10 MMOL/L (ref 5–15)
APTT PPP: 30 SECONDS (ref 20–40.3)
APTT PPP: 36.7 SECONDS (ref 20–40.3)
AST SERPL-CCNC: 51 U/L (ref 1–32)
BASOPHILS # BLD AUTO: 0.04 10*3/MM3 (ref 0–0.2)
BASOPHILS NFR BLD AUTO: 0.3 % (ref 0–1.5)
BH CV ECHO MEAS - ACS: 2.15 CM
BH CV ECHO MEAS - AO MAX PG: 13.2 MMHG
BH CV ECHO MEAS - AO MEAN PG: 7.3 MMHG
BH CV ECHO MEAS - AO ROOT DIAM: 3.4 CM
BH CV ECHO MEAS - AO V2 MAX: 181.5 CM/SEC
BH CV ECHO MEAS - AO V2 VTI: 30.1 CM
BH CV ECHO MEAS - AVA(I,D): 1.57 CM2
BH CV ECHO MEAS - EDV(CUBED): 95.1 ML
BH CV ECHO MEAS - EDV(MOD-SP2): 73.2 ML
BH CV ECHO MEAS - EDV(MOD-SP4): 72.1 ML
BH CV ECHO MEAS - EF(MOD-BP): 44.6 %
BH CV ECHO MEAS - EF(MOD-SP2): 40.6 %
BH CV ECHO MEAS - EF(MOD-SP4): 48 %
BH CV ECHO MEAS - ESV(CUBED): 28.7 ML
BH CV ECHO MEAS - ESV(MOD-SP2): 43.5 ML
BH CV ECHO MEAS - ESV(MOD-SP4): 37.5 ML
BH CV ECHO MEAS - FS: 32.9 %
BH CV ECHO MEAS - IVS/LVPW: 0.92 CM
BH CV ECHO MEAS - IVSD: 0.68 CM
BH CV ECHO MEAS - LA DIMENSION: 3.5 CM
BH CV ECHO MEAS - LV DIASTOLIC VOL/BSA (35-75): 41 CM2
BH CV ECHO MEAS - LV MASS(C)D: 100.3 GRAMS
BH CV ECHO MEAS - LV MAX PG: 5.7 MMHG
BH CV ECHO MEAS - LV MEAN PG: 3 MMHG
BH CV ECHO MEAS - LV SYSTOLIC VOL/BSA (12-30): 21.3 CM2
BH CV ECHO MEAS - LV V1 MAX: 119 CM/SEC
BH CV ECHO MEAS - LV V1 VTI: 19.6 CM
BH CV ECHO MEAS - LVIDD: 4.6 CM
BH CV ECHO MEAS - LVIDS: 3.1 CM
BH CV ECHO MEAS - LVOT AREA: 2.41 CM2
BH CV ECHO MEAS - LVOT DIAM: 1.75 CM
BH CV ECHO MEAS - LVPWD: 0.74 CM
BH CV ECHO MEAS - MR MAX PG: 111.5 MMHG
BH CV ECHO MEAS - MR MAX VEL: 526.9 CM/SEC
BH CV ECHO MEAS - MV DEC SLOPE: 928 CM/SEC2
BH CV ECHO MEAS - MV MAX PG: 7 MMHG
BH CV ECHO MEAS - MV MEAN PG: 4 MMHG
BH CV ECHO MEAS - MV P1/2T: 40.1 MSEC
BH CV ECHO MEAS - MV V2 VTI: 18.4 CM
BH CV ECHO MEAS - MVA(P1/2T): 5.5 CM2
BH CV ECHO MEAS - MVA(VTI): 2.6 CM2
BH CV ECHO MEAS - PA V2 MAX: 111.3 CM/SEC
BH CV ECHO MEAS - RAP SYSTOLE: 3 MMHG
BH CV ECHO MEAS - RVDD: 2.14 CM
BH CV ECHO MEAS - RVSP: 36.3 MMHG
BH CV ECHO MEAS - SI(MOD-SP2): 16.9 ML/M2
BH CV ECHO MEAS - SI(MOD-SP4): 19.7 ML/M2
BH CV ECHO MEAS - SV(LVOT): 47.3 ML
BH CV ECHO MEAS - SV(MOD-SP2): 29.7 ML
BH CV ECHO MEAS - SV(MOD-SP4): 34.6 ML
BH CV ECHO MEAS - TAPSE (>1.6): 2.03 CM
BH CV ECHO MEAS - TR MAX PG: 33.3 MMHG
BH CV ECHO MEAS - TR MAX VEL: 288.3 CM/SEC
BILIRUB SERPL-MCNC: 0.4 MG/DL (ref 0–1.2)
BUN SERPL-MCNC: 23 MG/DL (ref 8–23)
BUN/CREAT SERPL: 28.8 (ref 7–25)
CALCIUM SPEC-SCNC: 8 MG/DL (ref 8.6–10.5)
CHLORIDE SERPL-SCNC: 108 MMOL/L (ref 98–107)
CK SERPL-CCNC: 1083 U/L (ref 20–180)
CO2 SERPL-SCNC: 21 MMOL/L (ref 22–29)
CREAT SERPL-MCNC: 0.8 MG/DL (ref 0.57–1)
D-LACTATE SERPL-SCNC: 1.2 MMOL/L (ref 0.5–2)
DEPRECATED RDW RBC AUTO: 42.1 FL (ref 37–54)
EGFRCR SERPLBLD CKD-EPI 2021: 73.7 ML/MIN/1.73
EOSINOPHIL # BLD AUTO: 0.13 10*3/MM3 (ref 0–0.4)
EOSINOPHIL NFR BLD AUTO: 1.1 % (ref 0.3–6.2)
ERYTHROCYTE [DISTWIDTH] IN BLOOD BY AUTOMATED COUNT: 14 % (ref 12.3–15.4)
GLOBULIN UR ELPH-MCNC: 3.3 GM/DL
GLUCOSE SERPL-MCNC: 154 MG/DL (ref 65–99)
HBA1C MFR BLD: 5.7 % (ref 4.8–5.6)
HCT VFR BLD AUTO: 30.5 % (ref 34–46.6)
HGB BLD-MCNC: 10.2 G/DL (ref 12–15.9)
HOLD SPECIMEN: NORMAL
INR PPP: 1.26 (ref 0.8–1.2)
LEFT ATRIUM VOLUME INDEX: 33.1 ML/M2
LYMPHOCYTES # BLD AUTO: 1.5 10*3/MM3 (ref 0.7–3.1)
LYMPHOCYTES NFR BLD AUTO: 12.5 % (ref 19.6–45.3)
MCH RBC QN AUTO: 27.8 PG (ref 26.6–33)
MCHC RBC AUTO-ENTMCNC: 33.4 G/DL (ref 31.5–35.7)
MCV RBC AUTO: 83.1 FL (ref 79–97)
MONOCYTES # BLD AUTO: 0.89 10*3/MM3 (ref 0.1–0.9)
MONOCYTES NFR BLD AUTO: 7.4 % (ref 5–12)
NEUTROPHILS NFR BLD AUTO: 76.9 % (ref 42.7–76)
NEUTROPHILS NFR BLD AUTO: 9.19 10*3/MM3 (ref 1.7–7)
PLATELET # BLD AUTO: 452 10*3/MM3 (ref 140–450)
PMV BLD AUTO: 8.8 FL (ref 6–12)
POTASSIUM SERPL-SCNC: 4.2 MMOL/L (ref 3.5–5.2)
POTASSIUM SERPL-SCNC: 4.2 MMOL/L (ref 3.5–5.2)
PROT SERPL-MCNC: 6.2 G/DL (ref 6–8.5)
PROTHROMBIN TIME: 15.7 SECONDS (ref 11.1–15.3)
RBC # BLD AUTO: 3.67 10*6/MM3 (ref 3.77–5.28)
SODIUM SERPL-SCNC: 139 MMOL/L (ref 136–145)
TSH SERPL DL<=0.05 MIU/L-ACNC: 2.96 UIU/ML (ref 0.27–4.2)
WBC NRBC COR # BLD: 11.96 10*3/MM3 (ref 3.4–10.8)

## 2023-09-22 PROCEDURE — 83036 HEMOGLOBIN GLYCOSYLATED A1C: CPT

## 2023-09-22 PROCEDURE — 73120 X-RAY EXAM OF HAND: CPT

## 2023-09-22 PROCEDURE — 84132 ASSAY OF SERUM POTASSIUM: CPT | Performed by: FAMILY MEDICINE

## 2023-09-22 PROCEDURE — 93306 TTE W/DOPPLER COMPLETE: CPT

## 2023-09-22 PROCEDURE — 25010000002 HEPARIN (PORCINE) PER 1000 UNITS

## 2023-09-22 PROCEDURE — 82150 ASSAY OF AMYLASE: CPT

## 2023-09-22 PROCEDURE — 73090 X-RAY EXAM OF FOREARM: CPT

## 2023-09-22 PROCEDURE — G0378 HOSPITAL OBSERVATION PER HR: HCPCS

## 2023-09-22 PROCEDURE — 72170 X-RAY EXAM OF PELVIS: CPT

## 2023-09-22 PROCEDURE — 85730 THROMBOPLASTIN TIME PARTIAL: CPT

## 2023-09-22 PROCEDURE — 97166 OT EVAL MOD COMPLEX 45 MIN: CPT

## 2023-09-22 PROCEDURE — 84443 ASSAY THYROID STIM HORMONE: CPT

## 2023-09-22 PROCEDURE — 83605 ASSAY OF LACTIC ACID: CPT

## 2023-09-22 PROCEDURE — 82550 ASSAY OF CK (CPK): CPT

## 2023-09-22 PROCEDURE — 25010000002 CEFTRIAXONE PER 250 MG

## 2023-09-22 PROCEDURE — 73100 X-RAY EXAM OF WRIST: CPT

## 2023-09-22 PROCEDURE — 85610 PROTHROMBIN TIME: CPT

## 2023-09-22 PROCEDURE — 80053 COMPREHEN METABOLIC PANEL: CPT

## 2023-09-22 PROCEDURE — 85027 COMPLETE CBC AUTOMATED: CPT

## 2023-09-22 RX ORDER — HEPARIN SODIUM 5000 [USP'U]/ML
60 INJECTION, SOLUTION INTRAVENOUS; SUBCUTANEOUS AS NEEDED
Status: DISCONTINUED | OUTPATIENT
Start: 2023-09-22 | End: 2023-09-23

## 2023-09-22 RX ORDER — HEPARIN SODIUM 5000 [USP'U]/ML
30 INJECTION, SOLUTION INTRAVENOUS; SUBCUTANEOUS AS NEEDED
Status: DISCONTINUED | OUTPATIENT
Start: 2023-09-22 | End: 2023-09-23

## 2023-09-22 RX ORDER — METOPROLOL TARTRATE 50 MG/1
50 TABLET, FILM COATED ORAL EVERY 12 HOURS SCHEDULED
Status: DISCONTINUED | OUTPATIENT
Start: 2023-09-22 | End: 2023-09-23

## 2023-09-22 RX ORDER — HEPARIN SODIUM 10000 [USP'U]/100ML
12 INJECTION, SOLUTION INTRAVENOUS
Status: DISCONTINUED | OUTPATIENT
Start: 2023-09-22 | End: 2023-09-23

## 2023-09-22 RX ADMIN — METOPROLOL TARTRATE 25 MG: 25 TABLET, FILM COATED ORAL at 14:01

## 2023-09-22 RX ADMIN — FAMOTIDINE 40 MG: 40 TABLET, FILM COATED ORAL at 08:16

## 2023-09-22 RX ADMIN — HEPARIN SODIUM 5000 UNITS: 5000 INJECTION INTRAVENOUS; SUBCUTANEOUS at 08:16

## 2023-09-22 RX ADMIN — Medication 10 ML: at 08:28

## 2023-09-22 RX ADMIN — AMLODIPINE BESYLATE 10 MG: 10 TABLET ORAL at 08:16

## 2023-09-22 RX ADMIN — SODIUM CHLORIDE 5 MG/HR: 900 INJECTION, SOLUTION INTRAVENOUS at 08:16

## 2023-09-22 RX ADMIN — METOPROLOL TARTRATE 50 MG: 50 TABLET, FILM COATED ORAL at 20:07

## 2023-09-22 RX ADMIN — POTASSIUM CHLORIDE 40 MEQ: 20 TABLET, EXTENDED RELEASE ORAL at 00:38

## 2023-09-22 RX ADMIN — CEFTRIAXONE SODIUM 1000 MG: 1 INJECTION, POWDER, FOR SOLUTION INTRAMUSCULAR; INTRAVENOUS at 13:53

## 2023-09-22 RX ADMIN — SODIUM CHLORIDE 100 ML/HR: 9 INJECTION, SOLUTION INTRAVENOUS at 07:04

## 2023-09-22 RX ADMIN — DOCUSATE SODIUM 50 MG AND SENNOSIDES 8.6 MG 2 TABLET: 8.6; 5 TABLET, FILM COATED ORAL at 08:16

## 2023-09-22 RX ADMIN — DOCUSATE SODIUM 50 MG AND SENNOSIDES 8.6 MG 2 TABLET: 8.6; 5 TABLET, FILM COATED ORAL at 20:06

## 2023-09-22 RX ADMIN — HEPARIN SODIUM 12 UNITS/KG/HR: 10000 INJECTION, SOLUTION INTRAVENOUS at 14:30

## 2023-09-22 NOTE — PROGRESS NOTES
[facility] Medicine Services  INPATIENT PROGRESS NOTE    Length of Stay: 0  Date of Admission: 9/21/2023  Primary Care Physician: Provider, No Known    Subjective   Chief Complaint: was found on the floor by her son  HPI:  83-year-old male comes to the ER from home after being found on the floor of her bathroom. Spoke to her sons Bruce and Bao, they found her on the bathroom floor, maybe after 36 hours. Today son says she complained of numbness left forearm 3 days ago.  Patient does not remember what happened, now more oriented and denies chest pain, no shortness of breath, no fever, no urinary or GI symptoms. Family thinks she was on the ground for 2 nights. She is complaining of left hip pain. PMH: Type 2 diabetes, hyperlipidemia, essential hypertension, osteoporosis.    As of today, 09/22/23  Review of Systems   Constitutional:  Negative for activity change, appetite change, chills, diaphoresis and fatigue.   HENT:  Negative for congestion, ear discharge, hearing loss, rhinorrhea, sinus pain, sneezing and sore throat.    Eyes:  Negative for photophobia, discharge and visual disturbance.   Respiratory:  Negative for cough, chest tightness, shortness of breath and wheezing.    Cardiovascular:  Negative for chest pain and palpitations.   Gastrointestinal:  Negative for abdominal distention, abdominal pain, blood in stool, diarrhea, nausea and vomiting.   Endocrine: Negative for cold intolerance, heat intolerance, polydipsia, polyphagia and polyuria.   Genitourinary:  Negative for dysuria, flank pain, hematuria and urgency.   Musculoskeletal:  Negative for arthralgias, joint swelling and myalgias.   Skin:  Negative for color change.   Allergic/Immunologic: Negative for food allergies.   Neurological:  Negative for dizziness, seizures, syncope, speech difficulty, weakness and headaches.   Hematological:  Negative for adenopathy. Does not bruise/bleed easily.    Psychiatric/Behavioral:  Negative for confusion, hallucinations, self-injury and suicidal ideas. The patient is not nervous/anxious.       All pertinent negatives and positives are as above. All other systems have been reviewed and are negative unless otherwise stated.    Objective    Temp:  [97.7 °F (36.5 °C)-98.3 °F (36.8 °C)] 98.3 °F (36.8 °C)  Heart Rate:  [] 120  Resp:  [16-20] 16  BP: (110-193)/(58-92) 162/76         As of today, 09/22/23  Physical Exam  Constitutional:       Appearance: Normal appearance.   HENT:      Head: Normocephalic and atraumatic.      Right Ear: Tympanic membrane normal.      Left Ear: Tympanic membrane normal.      Nose: Nose normal.      Mouth/Throat:      Mouth: Mucous membranes are moist.   Eyes:      Pupils: Pupils are equal, round, and reactive to light.   Cardiovascular:      Rate and Rhythm: Normal rate and regular rhythm. Rhythm irregular.   Pulmonary:      Effort: Pulmonary effort is normal.      Breath sounds: Normal breath sounds.   Abdominal:      General: Abdomen is flat. Bowel sounds are normal.      Palpations: Abdomen is soft.   Musculoskeletal:         General: Normal range of motion.      Cervical back: Normal range of motion and neck supple.   Skin:     General: Skin is warm and dry.      Capillary Refill: Capillary refill takes less than 2 seconds.      Comments: Bruises in hips, knees and forehead   Neurological:      General: No focal deficit present.      Mental Status: She is alert and oriented to person, place, and time.   Psychiatric:         Mood and Affect: Mood normal.         Behavior: Behavior normal.         Thought Content: Thought content normal.         Judgment: Judgment normal.         Results Review:  I have reviewed the labs, radiology results, and diagnostic studies.    Laboratory Data:   Results from last 7 days   Lab Units 09/22/23  0534 09/21/23  1421   SODIUM mmol/L 139 140   POTASSIUM mmol/L 4.2  4.2 3.4*   CHLORIDE mmol/L 108* 104    CO2 mmol/L 21.0* 21.0*   BUN mg/dL 23 31*   CREATININE mg/dL 0.80 1.33*   GLUCOSE mg/dL 154* 122*   CALCIUM mg/dL 8.0* 8.3*   BILIRUBIN mg/dL 0.4 0.6   ALK PHOS U/L 64 65   ALT (SGPT) U/L 38* 41*   AST (SGOT) U/L 51* 72*   ANION GAP mmol/L 10.0 15.0     Estimated Creatinine Clearance: 56 mL/min (by C-G formula based on SCr of 0.8 mg/dL).  Results from last 7 days   Lab Units 09/21/23  1421   MAGNESIUM mg/dL 1.9         Results from last 7 days   Lab Units 09/22/23  0534 09/21/23  1421   WBC 10*3/mm3 11.96* 15.49*   HEMOGLOBIN g/dL 10.2* 10.7*   HEMATOCRIT % 30.5* 31.0*   PLATELETS 10*3/mm3 452* 419     Results from last 7 days   Lab Units 09/22/23  0534   INR  1.26*       Culture Data:   No results found for: BLOODCX  No results found for: URINECX  No results found for: RESPCX  No results found for: WOUNDCX  No results found for: STOOLCX  No components found for: BODYFLD    Radiology Data:   Imaging Results (Last 24 Hours)       Procedure Component Value Units Date/Time    XR Pelvis 1 or 2 View [[id number]] Resulted: 09/22/23 1132     Updated: 09/22/23 1153    XR Forearm 2 View Left [[id number]] Resulted: 09/22/23 1132     Updated: 09/22/23 1153    XR Wrist 2 View Left [[id number]] Resulted: 09/22/23 1131     Updated: 09/22/23 1153    XR Hand 2 View Left [[id number]] Resulted: 09/22/23 1131     Updated: 09/22/23 1153    US Carotid Bilateral [[id number]] Collected: 09/21/23 2119     Updated: 09/21/23 2135    Narrative:      COMPARISON:  None.    TECHNIQUE:  High-resolution gray scale imaging, color Doppler, velocity recordings, and  spectral analysis of the common carotid, external carotid, and internal carotid  arteries bilaterally were obtained.  Waveform analysis of the vertebral arteries  bilaterally was completed.  Using consensus data for these images, velocity  values and waveform analysis, a standard percent stenosis is reported.  Velocity  criteria are extrapolated from diameter data derived from the  Intersocietal  Accreditation Committee recommended amendment to the Society of Radiologists in  ultrasound Consensus Conference in 2003; 229;340-346.  Reference: IAC Carotid  Criteria White Paper 1-2014.    FINDINGS:  Right: Peak systolic velocity in the right common carotid is 69 cm/s and 77 cm/s  in the right internal carotid artery. There is antegrade flow in the right  vertebral. The peak systolic internal to common carotid ratio is 1.1.    Left: Peak systolic velocity in the left common carotid is 74 cm/s and 64 cm/s  in the left internal carotid artery. There is antegrade flow in the left  vertebral. Peak systolic internal to common carotid ratio on the left 0.8.    There is mild plaque in both carotid bulbs.      Impression:      1. Mild plaque in both carotid bulbs with stenosis less than 50% by hemodynamic  criteria.    CT Thoracic Spine Without Contrast [[id number]] Collected: 09/21/23 1643     Updated: 09/21/23 1921    Narrative:      INDICATION:  Trauma.    COMPARISON:  None relevant.    TECHNIQUE:  Helical CT of the thoracic spine was performed without intravenous contrast.  Multiplanar reformations were provided.    FINDINGS:  There is height loss of T12 which is age indeterminate, but appears to be  chronic.  There may be an acute component.  Remainder of the thoracic spine  shows no acute trauma.  Alignment appears to be intact.  There is multilevel  spondylosis.      Impression:      1.  Probable acute on chronic superior endplate fracture of T12.  If there is  further need to date the fracture, MRI can be obtained to assess for the  presence of edema.  2.  No additional compression deformities are seen within the thoracic spine.        CT Lumbar Spine Without Contrast [[id number]] Collected: 09/21/23 1646     Updated: 09/21/23 1921    Narrative:      INDICATION:  Trauma.    COMPARISON:  None relevant.    TECHNIQUE:  Helical CT of the lumbar spine was performed without intravenous  contrast.  Multiplanar reformations were provided.    FINDINGS:  There is an acute on chronic appearing fracture of T12.  Within the lumbar spine  alignment appears to be intact.  No additional fractures are seen.  Mild  multilevel spondylosis and facet arthrosis.  Diffuse vascular calcification.      Impression:      Acute on chronic appearing superior endplate compression deformity  of T12.  No evidence of fracture within the lumbar spine.        CT Chest With Contrast Diagnostic [[id number]] Collected: 09/21/23 1641     Updated: 09/21/23 1920    Narrative:      INDICATION:  Trauma.    COMPARISON:  None relevant.    TECHNIQUE:  Helical CT of the chest was performed with intravenous contrast.  Multiplanar  reformations were provided.    FINDINGS:  Cardiac size is not enlarged.  Diffuse vascular calcification.  No pleural  effusion or pneumothorax.  No worrisome pulmonary nodules.  No acute osseous  trauma is visualized.  Chronic right-sided rib fractures.  T12 fracture.      Impression:      1.  Probable acute on chronic superior endplate fracture of T12.  If there is  further need to date the fracture, MRI can be obtained to assess for the  presence of edema.  2.  No additional acute process within the thoracic spine.        CT Abdomen Pelvis With Contrast [[id number]] Collected: 09/21/23 1642     Updated: 09/21/23 1920    Narrative:      INDICATION:  Trauma.    COMPARISON:  None relevant.    TECHNIQUE:  Helical CT of the abdomen and pelvis was performed with intravenous contrast.  Multiplanar reformations were provided.    FINDINGS:  Lung bases are clear.    Liver, spleen, adrenals, pancreas stomach and kidneys show no acute process.  No  free air or free fluid.  Normal volume of colonic stool.  Cholelithiasis.      Impression:      Negative for acute trauma within the abdomen or pelvis.      CT Cervical Spine Without Contrast [[id number]] Collected: 09/21/23 1612     Updated: 09/21/23 1638    Narrative:      CT  CERVICAL SPINE WITHOUT CONTRAST:    INDICATION:  Trauma.    TECHNIQUE:  Axial images from the base of the skull through the thoracic inlet were  performed followed by 2D multiplanar reformats.    FINDINGS:  There is degenerative change and osteopenia.  Study is negative for fracture or  other acute abnormality.    SUMMARY:  No acute findings.    CT Head Without Contrast [[id number]] Collected: 09/21/23 1611     Updated: 09/21/23 1638    Narrative:      CT HEAD WITHOUT CONTRAST:    INDICATION:  Trauma.    TECHNIQUE:  Axial images were performed from the calvarium through the base of the skull  followed by 2D multiplanar reformats.    FINDINGS:  There is age-related atrophy.  No hemorrhage or other acute abnormality seen.  No focal abnormal densities.    SUMMARY:  No acute findings.    CT Maxillofacial Without Contrast [[id number]] Collected: 09/21/23 1613     Updated: 09/21/23 1638    Narrative:      INDICATION:  Trauma.    TECHNIQUE:  Axial images from the base of the skull through the mandible were performed  followed by 2D multiplanar reformats.    FINDINGS:  No fracture or other acute abnormality demonstrated.  Paraspinal sinuses are  clear.    SUMMARY:  Negative.            I have utilized all available immediate resources to obtain, update, or review the patient's current medications (including all prescriptions, over-the-counter products, herbals, cannabis/cannabidiol products, and vitamin/mineral/dietary (nutritional) supplements).       Assessment/Plan     Active Hospital Problems    Diagnosis     **Rhabdomyolysis          Assessment and Plan: 83 years old male patient found on the floor after 2 days, no syncope, maybe had dizzines, will do complete cardiac work up. Has renal failure, rhabdomyolysis. Hypokalemia. Leucocytosis. Has afib RVR, probable syncopal episode, full anticoagulation and rate control.  Problems:  Atrial fibrillation with RVR, Cardizem drip. Probable Afib RVR at home that caused syncope.  Lopressor  Rhabdomyolysis, IVF, am labs, improving  Renal failure, prerenal, IVF, improving  High WBC, to follow, urine and blood cultures, abnormal urine analysis. UTI. Start Rocephin  Hypokalemia, electrolyte replacement protocol  Old T12 and right ribs fractures, underlined osteoporosis  Frequent falls    Medical Decision Making  Number and Complexity of problems: 4  Differential Diagnosis: SP fall for 48 hours on the floor    Conditions and Status:        Condition is improving.     [facility] Data  External documents reviewed: previous medical records  My EKG interpretation: atrial fibrillation  My CT interpretation: chest right ribs fractures, T12 compression fracture  Tests considered but not ordered: none     Decision rules/scores evaluated (example [id number], Wells, etc): 7     Discussed with: the patient     Treatment Plan  Continue CCU  MRI brain  Rocephin  Consult cardiology  Heparin drip  Lopressor  echocardiogram    Care Planning  Shared decision making: sons  Code status and discussions: full code    Disposition  Social Determinants of Health that impact treatment or disposition: none  I expect the patient to be discharged to home in 2 days.       I confirmed that the patient's Advance Care Plan is present, code status is documented, or surrogate decision maker is listed in the patient's medical record.      This document has been electronically signed by Gabriele Franco MD on September 22, 2023 11:55 CDT

## 2023-09-22 NOTE — THERAPY EVALUATION
Patient Name: Eloisa Chang  : 1940    MRN: [id number]                              Today's Date: 2023       Admit Date: 2023    Visit Dx:     ICD-10-CM ICD-9-CM   1. Traumatic rhabdomyolysis, initial encounter  T79.6XXA 958.6   2. Compression fracture of T12 vertebra, initial encounter  S22.080A 805.2   3. Impaired mobility and ADLs [Z74.09, Z78.9 (ICD-10-CM)]  Z74.09 V49.89    Z78.9      Patient Active Problem List   Diagnosis    Rhabdomyolysis     Past Medical History:   Diagnosis Date    Diabetes mellitus     Hypertension     Stroke      Past Surgical History:   Procedure Laterality Date    HYSTERECTOMY        General Information       [facility] Name 23 0858          OT Time and Intention    Document Type evaluation  -     Mode of Treatment occupational therapy  -       Row Name 23 0858          General Information    Prior Level of Function independent:;gait;transfer;bed mobility;ADL's;feeding;grooming;dressing;bathing;home management;cooking;cleaning;driving;shopping;yard work  -     Existing Precautions/Restrictions fall;other (see comments);spinal;brace worn when out of bed   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 Tub/shower, has a tub bench, and a shower chair. Raised toilets at home. Was not using any AD prior to admission. Still does yard work. Equipment owned from father includes: w/c, and quad cane.  -       Row Name 23 0858          Cognition    Orientation Status (Cognition) oriented to;person;situation;time;verbal cues/prompts needed for orientation;place  -[facility] Name 23 0858          Safety Issues,  Functional Mobility    Safety Issues Affecting Function (Mobility) positioning of assistive device;insight into deficits/self-awareness  -     Impairments Affecting Function (Mobility) pain;grasp;balance;strength;range of motion (ROM)  -               User Key  (r) = Recorded By, (t) = Taken By, (c) = Cosigned By      Initials Name Provider Type     Brayden Diaz, NESTOR Occupational Therapist                     Mobility/ADL's       Row Name 09/22/23 0858          Bed Mobility    Bed Mobility supine-sit;sit-supine  -     Supine-Sit [place] (Bed Mobility) moderate assist (50% patient effort)  -     Sit-Supine [place] (Bed Mobility) maximum assist (25% patient effort)  -       Row Name 09/22/23 0858          Transfers    Transfers sit-stand transfer;stand-sit transfer  -[facility] Name 09/22/23 0858          Sit-Stand Transfer    Sit-Stand [place] (Transfers) minimum assist (75% patient effort)  -     Assistive Device (Sit-Stand Transfers) walker, front-wheeled  -[facility] Name 09/22/23 0858          Stand-Sit Transfer    Stand-Sit [place] (Transfers) 2 person assist  -     Assistive Device (Stand-Sit Transfers) walker, front-wheeled  -       Row Name 09/22/23 0858          Activities of Daily Living    BADL Assessment/Intervention lower body dressing  -       Row Name 09/22/23 0858          Lower Body Dressing Assessment/Training    [place] Level (Lower Body Dressing) doff;don;socks;dependent (less than 25% patient effort)  -               User Key  (r) = Recorded By, (t) = Taken By, (c) = Cosigned By      Initials Name Provider Type     Brayden Diaz OT Occupational Therapist                   Obj/Interventions       Row Name 09/22/23 0858          Sensory Assessment (Somatosensory)    Sensory Assessment (Somatosensory) UE sensation intact  -       Row Name 09/22/23 0858          Range of Motion Comprehensive    General Range of Motion other (see comments)   -     Comment, General Range of Motion R shoulder flexion 120º, R shoulder abduction 60º, R shoulder extension/adduction WFL; R elbow/wrist/hand WFL; L shoulder flexion  70º, L shoulder abduction WFL, L shoulder adduction/extension WFL, L elbow/wrist/hand WFL (L hand with edema)  -       Row Name 09/22/23 0858          Strength Comprehensive (MMT)    General Manual Muscle Testing (MMT) Assessment other (see comments)  -     Comment, General Manual Muscle Testing (MMT) Assessment [place] shoulder flexion 3/5, [place] shoulder abduction 3/5, parker shoulder extension/adduction 3+/5; R elbow/wrist/hand 3+/5, L elbow/wrist 3+/5, L hand 3/5  -               User Key  (r) = Recorded By, (t) = Taken By, (c) = Cosigned By      Initials Name Provider Type     Brayden Diaz, OT Occupational Therapist                   Goals/Plan       [facility] Name 09/22/23 0858          Transfer Goal 1 (OT)    Activity/Assistive Device (Transfer Goal 1, OT) toilet  -     [place] Level/Cues Needed (Transfer Goal 1, OT) standby assist  -SJ     Time Frame (Transfer Goal 1, OT) long term goal (LTG);by discharge  -     Progress/Outcome (Transfer Goal 1, OT) goal not met  -[facility] Name 09/22/23 0858          Bathing Goal 1 (OT)    Activity/Device (Bathing Goal 1, OT) lower body bathing;long-handled sponge  -     [place] Level/Cues Needed (Bathing Goal 1, OT) standby assist  -SJ     Time Frame (Bathing Goal 1, OT) long term goal (LTG);by discharge  -     Progress/Outcomes (Bathing Goal 1, OT) goal not met  -[facility] Name 09/22/23 0858          Dressing Goal 1 (OT)    Activity/Device (Dressing Goal 1, OT) lower body dressing;long-handled shoe horn;reacher;sock-aid;dressing stick  -     [place]/Cues Needed (Dressing Goal 1, OT) standby assist  -SJ     Time Frame (Dressing Goal 1, OT) long term goal (LTG);by discharge  -     Progress/Outcome (Dressing Goal 1, OT) goal not met  -[facility] Name 09/22/23 0858           Toileting Goal 1 (OT)    Activity/Device (Toileting Goal 1, OT) toileting skills, all  -SJ     [place] Level/Cues Needed (Toileting Goal 1, OT) standby assist  -SJ     Time Frame (Toileting Goal 1, OT) long term goal (LTG);by discharge  -SJ     Progress/Outcome (Toileting Goal 1, OT) goal not met  -       Row Name 09/22/23 0858          Therapy Assessment/Plan (OT)    Planned Therapy Interventions (OT) activity tolerance training;edema control/reduction;BADL retraining;adaptive equipment training;cognitive/visual perception retraining;functional balance retraining;IADL retraining;manual therapy/joint mobilization;occupation/activity based interventions;neuromuscular control/coordination retraining;patient/caregiver education/training;passive ROM/stretching;ROM/therapeutic exercise;transfer/mobility retraining;strengthening exercise  -               User Key  (r) = Recorded By, (t) = Taken By, (c) = Cosigned By      Initials Name Provider Type     Brayden Diaz, OT Occupational Therapist                   Clinical Impression       Row Name 09/22/23 0858          Pain Assessment    Pretreatment Pain Rating 0/10 - no pain  -     Posttreatment Pain Rating 3/10  -SJ     Pain Location generalized  -SJ     Pain Intervention(s) Repositioned;Ambulation/increased activity;Nursing Notified  -       Row Name 09/22/23 0871          Plan of Care Review    Plan of Care Reviewed With patient;son  -     Outcome Evaluation OT eval complete, alert and cooperative. 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 Brace adjusted with dependence. Patient reports no higher than 3/10 pain, denies pain in any specific location. Supine to sit with mod A (via log rolling). Sit to stand with min A and RW, patient able to take several side steps towards HOB (~3 steps) with max verbal cues; patient apprehensive of placing L wrist hand RW; has diffiuclties bearing weight through RLE, but does not c/o  of increased pain or discomfort in negra hips or negra knees. Sit to supine with max A via log rolling. In bed, all needs in reach. Patient with decreased standing balance, decreased UE ROM/strength, generalized pain, fear of falling, decreased safety in trasnfers, and decreased independence in ADLs. Cont OT services. Based on current presentation, hx of falls, and current fear of falling, patient may benefit from rehab to home program.  -       Row Name 09/22/23 0858          Therapy Assessment/Plan (OT)    Patient/Family Therapy Goal Statement (OT) family wants patient to live with son at d/c  -     Rehab Potential (OT) fair, will monitor progress closely  -     Criteria for Skilled Therapeutic Interventions Met (OT) yes;skilled treatment is necessary  -     Therapy Frequency (OT) other (see comments)  5-7 d/wk  -     Predicted Duration of Therapy Intervention (OT) until d/c or all goals met  -       Row Name 09/22/23 0858          Therapy Plan Review/Discharge Plan (OT)    Anticipated Discharge Disposition (OT) skilled nursing facility;inpatient rehabilitation facility  -       Row Name 09/22/23 0858          Vital Signs    Pre Systolic BP Rehab 139  -SJ     Pre Treatment Diastolic BP 65  -SJ     Post Systolic BP Rehab 162  -SJ     Post Treatment Diastolic BP 76  -SJ     Pretreatment Heart Rate (beats/min) 102  -SJ     Posttreatment Heart Rate (beats/min) 107  -SJ     Pre SpO2 (%) 94  -SJ     O2 Delivery Pre Treatment room air  -SJ     O2 Delivery Intra Treatment room air  -SJ     Post SpO2 (%) 99  -SJ     Pre Patient Position Supine  -SJ     Post Patient Position Supine  -       Row Name 09/22/23 0858          Positioning and Restraints    Pre-Treatment Position in bed  -SJ     Post Treatment Position bed  -SJ     In Bed notified nsg;supine;encouraged to call for assist;side rails up x2;with family/caregiver;call light within reach  -               User Key  (r) = Recorded By, (t) = Taken By, (c) =  Cosigned By      Initials Name Provider Type    Brayden Daniels OT Occupational Therapist                   Outcome Measures       Row Name 09/22/23 0858          How much help from another is currently needed...    Putting on and taking off regular lower body clothing? 1  -SJ     Bathing (including washing, rinsing, and drying) 2  -SJ     Toileting (which includes using toilet bed pan or urinal) 1  -SJ     Putting on and taking off regular upper body clothing 2  -SJ     Taking care of personal grooming (such as brushing teeth) 3  -SJ     Eating meals 3  -SJ     AM-PAC 6 Clicks Score (OT) 12  -SJ       Row Name 09/22/23 0858          Functional Assessment    Outcome Measure Options AM-PAC 6 Clicks Daily Activity (OT)  -               User Key  (r) = Recorded By, (t) = Taken By, (c) = Cosigned By      Initials Name Provider Type    Brayden Daniels OT Occupational Therapist                    Occupational Therapy Education       Title: PT OT SLP Therapies (In Progress)       Topic: Occupational Therapy (In Progress)       Point: ADL training (Done)       Description:   Instruct learner(s) on proper safety adaptation and remediation techniques during self care or transfers.   Instruct in proper use of assistive devices.                  Learning Progress Summary             Patient Acceptance, E,TB, VU by EH at 9/22/2023 0985    Comment: POC, role of OT, transfer training                         Point: Home exercise program (Not Started)       Description:   Instruct learner(s) on appropriate technique for monitoring, assisting and/or progressing therapeutic exercises/activities.                  Learner Progress:  Not documented in this visit.              Point: Precautions (Not Started)       Description:   Instruct learner(s) on prescribed precautions during self-care and functional transfers.                  Learner Progress:  Not documented in this visit.              Point: Body mechanics (Not  Started)       Description:   Instruct learner(s) on proper positioning and spine alignment during self-care, functional mobility activities and/or exercises.                  Learner Progress:  Not documented in this visit.                              User Key       Initials Effective Dates Name Provider Type Discipline     06/14/21 -  Brayden Diaz, OT Occupational Therapist OT                  OT Recommendation and Plan  Planned Therapy Interventions (OT): activity tolerance training, edema control/reduction, BADL retraining, adaptive equipment training, cognitive/visual perception retraining, functional balance retraining, IADL retraining, manual therapy/joint mobilization, occupation/activity based interventions, neuromuscular control/coordination retraining, patient/caregiver education/training, passive ROM/stretching, ROM/therapeutic exercise, transfer/mobility retraining, strengthening exercise  Therapy Frequency (OT): other (see comments) (5-7 d/wk)  Plan of Care Review  Plan of Care Reviewed With: patient, son  Outcome Evaluation: OT eval complete, alert and cooperative. 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 Brace adjusted with dependence. Patient reports no higher than 3/10 pain, denies pain in any specific location. Supine to sit with mod A (via log rolling). Sit to stand with min A and RW, patient able to take several side steps towards HOB (~3 steps) with max verbal cues; patient apprehensive of placing L wrist hand RW; has diffiuclties bearing weight through RLE, but does not c/o of increased pain or discomfort in negra hips or negra knees. Sit to supine with max A via log rolling. In bed, all needs in reach. Patient with decreased standing balance, decreased UE ROM/strength, generalized pain, fear of falling, decreased safety in trasnfers, and decreased independence in ADLs. Cont OT services. Based on current presentation, hx of falls, and current fear of  falling, patient may benefit from rehab to home program.     Time Calculation:   Evaluation Complexity (OT)  Review Occupational Profile/Medical/Therapy History Complexity: expanded/moderate complexity  Assessment, Occupational Performance/Identification of Deficit Complexity: 3-5 performance deficits  Clinical Decision Making Complexity (OT): detailed assessment/moderate complexity  Overall Complexity of Evaluation (OT): moderate complexity     Time Calculation- OT       Row Name 09/22/23 0954             Time Calculation- OT    OT Start Time 0858  -      OT Stop Time 0954  -      OT Time Calculation (min) 56 min  -      OT Received On 09/22/23  -      OT Goal Re-Cert Due Date 10/05/23  -         Untimed Charges    OT Eval/Re-eval Minutes 56  -         Total Minutes    Untimed Charges Total Minutes 56  -SJ       Total Minutes 56  -SJ                User Key  (r) = Recorded By, (t) = Taken By, (c) = Cosigned By      Initials Name Provider Type     Brayden Diaz OT Occupational Therapist                  Therapy Charges for Today       Code Description Service Date Service Provider Modifiers Qty    [id number] HC OT EVAL MOD COMPLEXITY 4 9/22/2023 Brayden Diaz OT GO 1                 Brayden Diaz OT  9/22/2023

## 2023-09-22 NOTE — PLAN OF CARE
Goal Outcome Evaluation:  Plan of Care Reviewed With: patient, son           Outcome Evaluation: OT eval complete, alert and cooperative. 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 Brace adjusted with dependence. Patient reports no higher than 3/10 pain, denies pain in any specific location. Supine to sit with mod A (via log rolling). Sit to stand with min A and RW, patient able to take several side steps towards HOB (~3 steps) with max verbal cues; patient apprehensive of placing L wrist hand RW; has diffiuclties bearing weight through RLE, but does not c/o of increased pain or discomfort in negra hips or negra knees. Sit to supine with max A via log rolling. In bed, all needs in reach. Patient with decreased standing balance, decreased UE ROM/strength, generalized pain, fear of falling, decreased safety in trasnfers, and decreased independence in ADLs. Cont OT services. Based on current presentation, hx of falls, and current fear of falling, patient may benefit from rehab to home program.      Anticipated Discharge Disposition (OT): skilled nursing facility, inpatient rehabilitation facility

## 2023-09-22 NOTE — DISCHARGE PLACEMENT REQUEST
"Eloisa Hernandez (83 y.o. Female)       Date of Birth   1940    Social Security Number       Address   [address] [place] [address]    Home Phone   [phone]    MRN   [id number]       Anabaptism   Non-Christianity    Marital Status                               Admission Date   9/21/23    Admission Type   Emergency    Admitting Provider   Alex Salcedo MD    Attending Provider   Alex Salcedo MD    Department, Room/Bed   [facility] CRITICAL CARE STEPDOWN, 17/A       Discharge Date       Discharge Disposition       Discharge Destination                                 Attending Provider: Alex Salcedo MD    Allergies: No Known Allergies    Isolation: None   Infection: None   Code Status: CPR    Ht: 170.2 cm (67.01\")   Wt: 65.4 kg (144 lb 2.9 oz)    Admission Cmt: None   Principal Problem: Rhabdomyolysis [M62.82]                   Active Insurance as of 9/21/2023       Primary Coverage       Payor Plan Insurance Group Employer/Plan Group    MEDICARE MEDICARE A & B        Payor Plan Address Payor Plan Phone Number Payor Plan Fax Number Effective Dates    [address] [phone]  4/1/2006 - None Entered    [facility] [id number]         Subscriber Name Subscriber Birth Date Member ID       ELOISA HERNANDEZ 1940 [id number]               Secondary Coverage       Payor Plan Insurance Group Employer/Plan Group    [place] [employer] [place]        Payor Plan Address Payor Plan Phone Number Payor Plan Fax Number Effective Dates    [address] [employer] [address] [place] [address] [phone]  1/1/2011 - None Entered    [facility] [id number]         Subscriber Name Subscriber Birth Date Member ID       ELOISA HERNANDEZ 1940 [id number]                     Emergency Contacts        (Rel.) Home Phone Work Phone Mobile Phone    DALLAS HERNANDEZ (Son) [phone] -- [phone]    MARYGILMA (Son) [phone] -- [phone]                "

## 2023-09-22 NOTE — CONSULTS
CARDIOVASCULAR CONSULTATION   Nuno Liu M.D., RPVI                Reason for Consultation: Atrial fibrillation    Chief complaint fall    Subjective .     History of present illness:  Eloisa Chang is a 83 y.o. female with a history of diabetes hypertension hyperlipidemia CVA who presented after being found down by her sons on the bathroom floor.  Potentially she was on the ground for 2 nights.  She reports getting out of bed at night feeling very wobbly and fell on the dresser and then dragged herself to the bathroom where she tried to get up again and fell again.  The son reports there was a long blood trail from her bedroom to the bathroom.  She denies any chest pain or shortness of breath at this time    Review of Systems   Constitutional: Positive for malaise/fatigue.   All other systems reviewed and are negative.      History  Past Medical History:   Diagnosis Date    Diabetes mellitus     Hypertension     Stroke    ,   Past Surgical History:   Procedure Laterality Date    HYSTERECTOMY     , History reviewed. No pertinent family history.,   Social History     Tobacco Use    Smoking status: Former     Types: Cigarettes    Smokeless tobacco: Never   Vaping Use    Vaping Use: Never used   Substance Use Topics    Alcohol use: Never    Drug use: Never   ,   Medications Prior to Admission   Medication Sig Dispense Refill Last Dose    lisinopril (PRINIVIL,ZESTRIL) 40 MG tablet Take 1 tablet by mouth Daily.       and Allergies:  Patient has no known allergies.    Objective   PHYSICAL EXAM:         Anticoagulants (From admission, onward)      Start     Dose/Rate Route Frequency Ordered Stop    09/22/23 1300  heparin 28465 units/250 mL (100 units/mL) in 0.45 % NaCl infusion        See Hyperspace for full Linked Orders Report.    12 Units/kg/hr × 65.4 kg  7.84 mL/hr  Intravenous Titrated 09/22/23 1210      09/22/23 1209  heparin (porcine) 5000 UNIT/ML injection 3,900 Units        See Hyperspace for full  "Linked Orders Report.    60 Units/kg × 65.4 kg Intravenous As Needed 09/22/23 1210      09/22/23 1209  heparin (porcine) 5000 UNIT/ML injection 2,000 Units        [facility].    30 Units/kg × 65.4 kg Intravenous As Needed 09/22/23 1210              Vital Sign Min/Max for last 24 hours  Temp  Min: 98 °F (36.7 °C)  Max: 98.3 °F (36.8 °C)   BP  Min: 110/75  Max: 193/76   Pulse  Min: 91  Max: 132   Resp  Min: 16  Max: 20   SpO2  Min: 91 %  Max: 100 %   No data recorded   Weight  Min: 65.4 kg (144 lb 1.6 oz)  Max: 65.4 kg (144 lb 2.9 oz)                                                         Vitals:    09/22/23 1000 09/22/23 1036 09/22/23 1100 09/22/23 1300   BP: 143/63  158/78 153/86   BP Location:       Patient Position:       Pulse: 111 107 109 (!) 129   Resp:       Temp:       TempSrc:       SpO2: 95% 94% 96% 96%   Weight:  65.4 kg (144 lb 2.9 oz)     Height:  170.2 cm (67.01\")         SpO2 Percentage    09/22/23 1036 09/22/23 1100 09/22/23 1300   SpO2: 94% 96% 96%     Body mass index is 22.58 kg/m².     Physical Exam  Constitutional:       Appearance: Normal appearance.   HENT:      Head: Normocephalic.   Eyes:      Extraocular Movements: Extraocular movements intact.   Cardiovascular:      Rate and Rhythm: Tachycardia present. Rhythm irregular.      Heart sounds: Normal heart sounds.   Pulmonary:      Breath sounds: Normal breath sounds.   Abdominal:      Palpations: Abdomen is soft.   Musculoskeletal:      Cervical back: Neck supple.   Skin:     General: Skin is warm.      Findings: Bruising and lesion present.   Neurological:      General: No focal deficit present.      Mental Status: She is alert.   Psychiatric:         Mood and Affect: Mood normal.         Behavior: Behavior normal.           DATA REVIEWED:     EKG. I personally reviewed and interpreted the EKG.  EKG showing atrial fibrillation with rapid ventricular response    ECG/EMG Results (all)       Procedure Component " Value Units Date/Time    ECG 12 Lead Other; fall [[id number]] Collected: 09/21/23 1250     Updated: 09/21/23 1256     QT Interval 330 ms      QTC Interval 454 ms     Narrative:      Test Reason : Other~  Blood Pressure :   */*   mmHG  Vent. Rate : 114 BPM     Atrial Rate : 100 BPM     P-R Int :   * ms          QRS Dur :  72 ms      QT Int : 330 ms       P-R-T Axes :   *  73 127 degrees     QTc Int : 454 ms    Atrial fibrillation with rapid ventricular response  Nonspecific ST and T wave abnormality , probably digitalis effect  Abnormal ECG  No previous ECGs available    Referred By:            Confirmed By:     ECG 12 Lead [[id number]]  (Abnormal) Resulted: 09/21/23 1728     Updated: 09/21/23 1728    Adult Transthoracic Echo Complete w/ Color, Spectral and Contrast if necessary per protocol [[id number]] Resulted: 09/22/23 1047     Updated: 09/22/23 1053     EF(MOD-bp) 44.6 %      LVIDd 4.6 cm      LVIDs 3.1 cm      IVSd 0.68 cm      LVPWd 0.74 cm      FS 32.9 %      IVS/LVPW 0.92 cm      ESV(cubed) 28.7 ml      LV Sys Vol (BSA corrected) 21.3 cm2      EDV(cubed) 95.1 ml      LV Ramirez Vol (BSA corrected) 41.0 cm2      LVOT area 2.41 cm2      LV mass(C)d 100.3 grams      LVOT diam 1.75 cm      EDV(MOD-sp2) 73.2 ml      EDV(MOD-sp4) 72.1 ml      ESV(MOD-sp2) 43.5 ml      ESV(MOD-sp4) 37.5 ml      SV(MOD-sp2) 29.7 ml      SV(MOD-sp4) 34.6 ml      SI(MOD-sp2) 16.9 ml/m2      SI(MOD-sp4) 19.7 ml/m2      EF(MOD-sp2) 40.6 %      EF(MOD-sp4) 48.0 %      LA ESV Index (BP) 33.1 ml/m2      SV(LVOT) 47.3 ml      RVIDd 2.14 cm      TAPSE (>1.6) 2.03 cm      LA dimension (2D)  3.5 cm      LV V1 max 119.0 cm/sec      LV V1 max PG 5.7 mmHg      LV V1 mean PG 3.0 mmHg      LV V1 VTI 19.6 cm      Ao pk rickey 181.5 cm/sec      Ao max PG 13.2 mmHg      Ao mean PG 7.3 mmHg      Ao V2 VTI 30.1 cm      CHUCK(I,D) 1.57 cm2      MV max PG 7.0 mmHg      MV mean PG 4.0 mmHg      MV V2 VTI 18.4 cm      MV P1/2t 40.1 msec      MVA(P1/2t) 5.5 cm2       MVA(VTI) 2.6 cm2      MV dec slope 928.0 cm/sec2      MR max rickey 526.9 cm/sec      MR max .5 mmHg      TR max rickey 288.3 cm/sec      TR max PG 33.3 mmHg      PA V2 max 111.3 cm/sec      Ao root diam 3.4 cm      ACS 2.15 cm      RVSP(TR) 36.3 mmHg      RAP systole 3.0 mmHg     Narrative:        Left ventricular systolic function is mildly decreased. Left   ventricular ejection fraction appears to be 46 - 50%.    Left ventricular diastolic function is consistent with (grade II w/high   LAP) pseudonormalization.    The left atrial cavity is mildly dilated.    Moderate tricuspid valve regurgitation is present.    Estimated right ventricular systolic pressure from tricuspid   regurgitation is mildly elevated (35-45 mmHg).      SCANNED EKG [[id number]] Resulted: 09/21/23     Updated: 09/22/23 1327    SCANNED EKG [[id number]] Resulted: 09/21/23     Updated: 09/22/23 1328    SCANNED EKG [[id number]] Resulted: 09/21/23     Updated: 09/22/23 1330          ---------------------------------------------------  TTE/VINNY:  Results for orders placed during the hospital encounter of 09/21/23    Adult Transthoracic Echo Complete w/ Color, Spectral and Contrast if necessary per protocol    Interpretation Summary    Left ventricular systolic function is mildly decreased. Left ventricular ejection fraction appears to be 46 - 50%.    Left ventricular diastolic function is consistent with (grade II w/high LAP) pseudonormalization.    The left atrial cavity is mildly dilated.    Moderate tricuspid valve regurgitation is present.    Estimated right ventricular systolic pressure from tricuspid regurgitation is mildly elevated (35-45 mmHg).          -----------------------------------------------------  CXR/Imaging:     Imaging Results (Most Recent)       Procedure Component Value Units Date/Time    XR Hand 2 View Left [[id number]] Collected: 09/22/23 1407     Updated: 09/22/23 1413    Narrative:      TECHNIQUE:  PA, oblique and lateral views  of the left hand    HISTORY:  Pain and swelling.    COMPARISON:  None.    FINDINGS:  Prominent arthritis at the index DIP joint, probably degenerative.  Less severe  joint space narrowing throughout the remainder of the IP joints of the fingers  and thumb.  No erosion.  Diffuse osteopenia.  No fracture is seen.    Please refer to separate dictation of the wrist.        XR Forearm 2 View Left [[id number]] Collected: 09/22/23 1352     Updated: 09/22/23 1355    Narrative:      History: pain    COMPARISON: None    FINDINGS:  AP and lateral views were obtained.    There is no fracture, dislocation or osseous destruction.        Impression:      No acute abnormality.      XR Wrist 2 View Left [[id number]] Collected: 09/22/23 1337     Updated: 09/22/23 1342    Narrative:      XR WRIST LEFT 2 VIEWS    HISTORY: pain    COMPARISON: None    FINDINGS:      No significant soft tissue swelling.    No fracture or dislocation.    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          XR Pelvis 1 or 2 View [[id number]] Collected: 09/22/23 1253     Updated: 09/22/23 1258    Narrative:      XR PELVIS 1 OR 2 VIEWS    HISTORY: sp fall    COMPARISON: None    FINDINGS:  There is no evidence of fracture or dislocation.    The joint spaces are maintained.    Low-lying bladder I cannot exclude partial prolapse..          Impression:      1. No acute fracture. Low-lying bladder. I cannot exclude partial prolapse..        US Carotid Bilateral [[id number]] Collected: 09/21/23 2119     Updated: 09/21/23 2135    Narrative:      COMPARISON:  None.    TECHNIQUE:  High-resolution gray scale imaging, color Doppler, velocity recordings, and  spectral analysis of the common carotid, external carotid, and internal carotid  arteries bilaterally were obtained.  Waveform analysis of the vertebral arteries  bilaterally was completed.  Using consensus data for these images,  velocity  values and waveform analysis, a standard percent stenosis is reported.  Velocity  criteria are extrapolated from diameter data derived from the Intersocietal  Accreditation Committee recommended amendment to the Society of Radiologists in  ultrasound Consensus Conference in 2003; 229;340-346.  Reference: IAC Carotid  Criteria White Paper 1-2014.    FINDINGS:  Right: Peak systolic velocity in the right common carotid is 69 cm/s and 77 cm/s  in the right internal carotid artery. There is antegrade flow in the right  vertebral. The peak systolic internal to common carotid ratio is 1.1.    Left: Peak systolic velocity in the left common carotid is 74 cm/s and 64 cm/s  in the left internal carotid artery. There is antegrade flow in the left  vertebral. Peak systolic internal to common carotid ratio on the left 0.8.    There is mild plaque in both carotid bulbs.      Impression:      1. Mild plaque in both carotid bulbs with stenosis less than 50% by hemodynamic  criteria.    CT Thoracic Spine Without Contrast [[id number]] Collected: 09/21/23 1643     Updated: 09/21/23 1921    Narrative:      INDICATION:  Trauma.    COMPARISON:  None relevant.    TECHNIQUE:  Helical CT of the thoracic spine was performed without intravenous contrast.  Multiplanar reformations were provided.    FINDINGS:  There is height loss of T12 which is age indeterminate, but appears to be  chronic.  There may be an acute component.  Remainder of the thoracic spine  shows no acute trauma.  Alignment appears to be intact.  There is multilevel  spondylosis.      Impression:      1.  Probable acute on chronic superior endplate fracture of T12.  If there is  further need to date the fracture, MRI can be obtained to assess for the  presence of edema.  2.  No additional compression deformities are seen within the thoracic spine.        CT Lumbar Spine Without Contrast [[id number]] Collected: 09/21/23 1646     Updated: 09/21/23 1921    Narrative:       INDICATION:  Trauma.    COMPARISON:  None relevant.    TECHNIQUE:  Helical CT of the lumbar spine was performed without intravenous contrast.  Multiplanar reformations were provided.    FINDINGS:  There is an acute on chronic appearing fracture of T12.  Within the lumbar spine  alignment appears to be intact.  No additional fractures are seen.  Mild  multilevel spondylosis and facet arthrosis.  Diffuse vascular calcification.      Impression:      Acute on chronic appearing superior endplate compression deformity  of T12.  No evidence of fracture within the lumbar spine.        CT Chest With Contrast Diagnostic [[id number]] Collected: 09/21/23 1641     Updated: 09/21/23 1920    Narrative:      INDICATION:  Trauma.    COMPARISON:  None relevant.    TECHNIQUE:  Helical CT of the chest was performed with intravenous contrast.  Multiplanar  reformations were provided.    FINDINGS:  Cardiac size is not enlarged.  Diffuse vascular calcification.  No pleural  effusion or pneumothorax.  No worrisome pulmonary nodules.  No acute osseous  trauma is visualized.  Chronic right-sided rib fractures.  T12 fracture.      Impression:      1.  Probable acute on chronic superior endplate fracture of T12.  If there is  further need to date the fracture, MRI can be obtained to assess for the  presence of edema.  2.  No additional acute process within the thoracic spine.        CT Abdomen Pelvis With Contrast [[id number]] Collected: 09/21/23 1642     Updated: 09/21/23 1920    Narrative:      INDICATION:  Trauma.    COMPARISON:  None relevant.    TECHNIQUE:  Helical CT of the abdomen and pelvis was performed with intravenous contrast.  Multiplanar reformations were provided.    FINDINGS:  Lung bases are clear.    Liver, spleen, adrenals, pancreas stomach and kidneys show no acute process.  No  free air or free fluid.  Normal volume of colonic stool.  Cholelithiasis.      Impression:      Negative for acute trauma within the abdomen or  pelvis.      CT Cervical Spine Without Contrast [[id number]] Collected: 09/21/23 1612     Updated: 09/21/23 1638    Narrative:      CT CERVICAL SPINE WITHOUT CONTRAST:    INDICATION:  Trauma.    TECHNIQUE:  Axial images from the base of the skull through the thoracic inlet were  performed followed by 2D multiplanar reformats.    FINDINGS:  There is degenerative change and osteopenia.  Study is negative for fracture or  other acute abnormality.    SUMMARY:  No acute findings.    CT Head Without Contrast [[id number]] Collected: 09/21/23 1611     Updated: 09/21/23 1638    Narrative:      CT HEAD WITHOUT CONTRAST:    INDICATION:  Trauma.    TECHNIQUE:  Axial images were performed from the calvarium through the base of the skull  followed by 2D multiplanar reformats.    FINDINGS:  There is age-related atrophy.  No hemorrhage or other acute abnormality seen.  No focal abnormal densities.    SUMMARY:  No acute findings.    CT Maxillofacial Without Contrast [[id number]] Collected: 09/21/23 1613     Updated: 09/21/23 1638    Narrative:      INDICATION:  Trauma.    TECHNIQUE:  Axial images from the base of the skull through the mandible were performed  followed by 2D multiplanar reformats.    FINDINGS:  No fracture or other acute abnormality demonstrated.  Paraspinal sinuses are  clear.    SUMMARY:  Negative.                  -----------------------------------------------------  CT:   XR Forearm 2 View Left    Result Date: 9/22/2023  No acute abnormality.     CT Chest With Contrast Diagnostic    Result Date: 9/21/2023  1.  Probable acute on chronic superior endplate fracture of T12.  If there is further need to date the fracture, MRI can be obtained to assess for the presence of edema. 2.  No additional acute process within the thoracic spine.     CT Thoracic Spine Without Contrast    Result Date: 9/21/2023  1.  Probable acute on chronic superior endplate fracture of T12.  If there is further need to date the fracture, MRI can  be obtained to assess for the presence of edema. 2.  No additional compression deformities are seen within the thoracic spine.     CT Lumbar Spine Without Contrast    Result Date: 9/21/2023  Acute on chronic appearing superior endplate compression deformity of T12.  No evidence of fracture within the lumbar spine.     CT Abdomen Pelvis With Contrast    Result Date: 9/21/2023  Negative for acute trauma within the abdomen or pelvis.     XR Pelvis 1 or 2 View    Result Date: 9/22/2023  1. No acute fracture. Low-lying bladder. I cannot exclude partial prolapse..     US Carotid Bilateral    Result Date: 9/21/2023  1. Mild plaque in both carotid bulbs with stenosis less than 50% by hemodynamic criteria.       ----------------------------------------------------  --------------------------------------------------------------------------------------------------    LABS:     The CVD Risk score (Matt, et al., 2008) failed to calculate for the following reasons:    The 2008 CVD risk score is only valid for ages 30 to 74  Lab Results   Component Value Date    GLUCOSE 154 (H) 09/22/2023    BUN 23 09/22/2023    CREATININE 0.80 09/22/2023    BCR 28.8 (H) 09/22/2023    K 4.2 09/22/2023    K 4.2 09/22/2023    CO2 21.0 (L) 09/22/2023    CALCIUM 8.0 (L) 09/22/2023    ALBUMIN 2.9 (L) 09/22/2023    AST 51 (H) 09/22/2023    ALT 38 (H) 09/22/2023       Lab Results   Component Value Date    GLUCOSE 154 (H) 09/22/2023    CALCIUM 8.0 (L) 09/22/2023     09/22/2023    K 4.2 09/22/2023    K 4.2 09/22/2023    CO2 21.0 (L) 09/22/2023     (H) 09/22/2023    BUN 23 09/22/2023    CREATININE 0.80 09/22/2023    BCR 28.8 (H) 09/22/2023    ANIONGAP 10.0 09/22/2023       Lab Results   Component Value Date    WBC 11.96 (H) 09/22/2023    HGB 10.2 (L) 09/22/2023    HCT 30.5 (L) 09/22/2023    MCV 83.1 09/22/2023     (H) 09/22/2023       Lab Results   Component Value Date    CHOL 187 03/19/2021    CHLPL 254 (H) 01/09/2023    TRIG 134  01/09/2023    HDL 73 01/09/2023     01/09/2023       Lab Results   Component Value Date    TSH 2.960 09/22/2023       Lab Results   Component Value Date    CKTOTAL 1,083 (H) 09/22/2023    TROPONINT 44 (H) 09/21/2023       Lab Results   Component Value Date    HGBA1C 5.70 (H) 09/22/2023     No results found for: DDIMER  Lab Results   Component Value Date    ALT 38 (H) 09/22/2023     Lab Results   Component Value Date    HGBA1C 5.70 (H) 09/22/2023    HGBA1C 5.8 01/09/2023    HGBA1C 6.0 (H) 03/19/2021     Lab Results   Component Value Date    MICROALBUR 31.9 (H) 01/09/2023    CREATININE 0.80 09/22/2023     No results found for: IRON, TIBC, FERRITIN  Lab Results   Component Value Date    INR 1.26 (H) 09/22/2023    PROTIME 15.7 (H) 09/22/2023             Assessment & Plan   Paroxysmal atrial fibrillation  Rhabdomyolysis  JASMEET    Patient with atrial fibrillation with rapid ventricular rate.  Son reports that this is not a new diagnosis and have been told about atrial fibrillation in the past.  Anticoagulation has not been discussed in the past.  She has a history of stroke.  Her [id number] score is 6.  We discussed benefits and risks of anticoagulation for her especially given her recent fall and trauma.  Family will discuss and think about it before deciding whether they want to initiate anticoagulation for her.  Continue with Cardizem for rate control.  Her echocardiogram showing mildly reduced EF with septal wall motion abnormalities.  This may be tachycardia induced cardiomyopathy versus also contributed from obstructive coronary artery disease.  Can consider ischemic investigations as an outpatient.  will increase her metoprolol to 50 every 12, continue to titrate to wean off diltiazem drip.

## 2023-09-22 NOTE — PLAN OF CARE
Problem: Fall Injury Risk  Goal: Absence of Fall and Fall-Related Injury  Outcome: Ongoing, Progressing   Goal Outcome Evaluation:  Plan of Care Reviewed With: patient, son        Progress: improving  Outcome Evaluation: Pulse has been consistently lower last half of shift rather than labile.  Pt. denies discomfort except when moved r/t injuries from fall.  Alert and oriented and very cooperative with care.  Tolerated eating snack and likes chocolate boost.  Able to make needs known.          S/w pt. Pt stated that she developed thrush in her mouth on Saturday. She stated that she has white patches on the roof of her mouth, inside of cheeks and lips and on her tongue and she would like an rx sent to pharmacy. Informed that Dr. Casiano is out of the clinic but I will send him a message and return her call. Pt verbalized understanding.

## 2023-09-23 ENCOUNTER — APPOINTMENT (OUTPATIENT)
Dept: GENERAL RADIOLOGY | Facility: HOSPITAL | Age: 83
DRG: 558 | End: 2023-09-23
Payer: MEDICARE

## 2023-09-23 ENCOUNTER — APPOINTMENT (OUTPATIENT)
Dept: ULTRASOUND IMAGING | Facility: HOSPITAL | Age: 83
DRG: 558 | End: 2023-09-23
Payer: MEDICARE

## 2023-09-23 ENCOUNTER — APPOINTMENT (OUTPATIENT)
Dept: INTERVENTIONAL RADIOLOGY/VASCULAR | Facility: HOSPITAL | Age: 83
DRG: 558 | End: 2023-09-23
Payer: MEDICARE

## 2023-09-23 ENCOUNTER — APPOINTMENT (OUTPATIENT)
Dept: MRI IMAGING | Facility: HOSPITAL | Age: 83
DRG: 558 | End: 2023-09-23
Payer: MEDICARE

## 2023-09-23 LAB
ANION GAP SERPL CALCULATED.3IONS-SCNC: 9 MMOL/L (ref 5–15)
APTT PPP: 45 SECONDS (ref 20–40.3)
APTT PPP: 50.5 SECONDS (ref 20–40.3)
BACTERIA SPEC AEROBE CULT: NORMAL
BASOPHILS # BLD AUTO: 0.1 10*3/MM3 (ref 0–0.2)
BASOPHILS NFR BLD AUTO: 0.7 % (ref 0–1.5)
BUN SERPL-MCNC: 17 MG/DL (ref 8–23)
BUN/CREAT SERPL: 20.7 (ref 7–25)
CALCIUM SPEC-SCNC: 8.3 MG/DL (ref 8.6–10.5)
CHLORIDE SERPL-SCNC: 106 MMOL/L (ref 98–107)
CO2 SERPL-SCNC: 22 MMOL/L (ref 22–29)
CREAT SERPL-MCNC: 0.82 MG/DL (ref 0.57–1)
DEPRECATED RDW RBC AUTO: 43.9 FL (ref 37–54)
EGFRCR SERPLBLD CKD-EPI 2021: 71.5 ML/MIN/1.73
EOSINOPHIL # BLD AUTO: 0.35 10*3/MM3 (ref 0–0.4)
EOSINOPHIL NFR BLD AUTO: 2.5 % (ref 0.3–6.2)
ERYTHROCYTE [DISTWIDTH] IN BLOOD BY AUTOMATED COUNT: 14.2 % (ref 12.3–15.4)
GLUCOSE BLDC GLUCOMTR-MCNC: 136 MG/DL (ref 70–130)
GLUCOSE SERPL-MCNC: 145 MG/DL (ref 65–99)
HCT VFR BLD AUTO: 30.6 % (ref 34–46.6)
HGB BLD-MCNC: 10.1 G/DL (ref 12–15.9)
IMM GRANULOCYTES # BLD AUTO: 0.66 10*3/MM3 (ref 0–0.05)
IMM GRANULOCYTES NFR BLD AUTO: 4.7 % (ref 0–0.5)
LYMPHOCYTES # BLD AUTO: 2.5 10*3/MM3 (ref 0.7–3.1)
LYMPHOCYTES NFR BLD AUTO: 17.8 % (ref 19.6–45.3)
MCH RBC QN AUTO: 27.8 PG (ref 26.6–33)
MCHC RBC AUTO-ENTMCNC: 33 G/DL (ref 31.5–35.7)
MCV RBC AUTO: 84.3 FL (ref 79–97)
MONOCYTES # BLD AUTO: 0.96 10*3/MM3 (ref 0.1–0.9)
MONOCYTES NFR BLD AUTO: 6.8 % (ref 5–12)
NEUTROPHILS NFR BLD AUTO: 67.5 % (ref 42.7–76)
NEUTROPHILS NFR BLD AUTO: 9.46 10*3/MM3 (ref 1.7–7)
NRBC BLD AUTO-RTO: 0 /100 WBC (ref 0–0.2)
PLATELET # BLD AUTO: 531 10*3/MM3 (ref 140–450)
PMV BLD AUTO: 8.7 FL (ref 6–12)
POTASSIUM SERPL-SCNC: 4.3 MMOL/L (ref 3.5–5.2)
RBC # BLD AUTO: 3.63 10*6/MM3 (ref 3.77–5.28)
SODIUM SERPL-SCNC: 137 MMOL/L (ref 136–145)
WBC NRBC COR # BLD: 14.03 10*3/MM3 (ref 3.4–10.8)

## 2023-09-23 PROCEDURE — 85730 THROMBOPLASTIN TIME PARTIAL: CPT | Performed by: FAMILY MEDICINE

## 2023-09-23 PROCEDURE — 25010000002 HEPARIN (PORCINE) PER 1000 UNITS: Performed by: FAMILY MEDICINE

## 2023-09-23 PROCEDURE — 25010000002 GADOTERIDOL PER 1 ML: Performed by: FAMILY MEDICINE

## 2023-09-23 PROCEDURE — A9579 GAD-BASE MR CONTRAST NOS,1ML: HCPCS | Performed by: FAMILY MEDICINE

## 2023-09-23 PROCEDURE — 82948 REAGENT STRIP/BLOOD GLUCOSE: CPT

## 2023-09-23 PROCEDURE — 36410 VNPNXR 3YR/> PHY/QHP DX/THER: CPT

## 2023-09-23 PROCEDURE — 80048 BASIC METABOLIC PNL TOTAL CA: CPT

## 2023-09-23 PROCEDURE — C1751 CATH, INF, PER/CENT/MIDLINE: HCPCS

## 2023-09-23 PROCEDURE — 73562 X-RAY EXAM OF KNEE 3: CPT

## 2023-09-23 PROCEDURE — 97162 PT EVAL MOD COMPLEX 30 MIN: CPT

## 2023-09-23 PROCEDURE — 25010000002 CEFTRIAXONE PER 250 MG

## 2023-09-23 PROCEDURE — 76937 US GUIDE VASCULAR ACCESS: CPT

## 2023-09-23 PROCEDURE — 85025 COMPLETE CBC W/AUTO DIFF WBC: CPT

## 2023-09-23 PROCEDURE — 70553 MRI BRAIN STEM W/O & W/DYE: CPT

## 2023-09-23 RX ORDER — HEPARIN SODIUM 10000 [USP'U]/100ML
18 INJECTION, SOLUTION INTRAVENOUS
Status: DISCONTINUED | OUTPATIENT
Start: 2023-09-23 | End: 2023-09-23

## 2023-09-23 RX ORDER — HEPARIN SODIUM 10000 [USP'U]/100ML
16 INJECTION, SOLUTION INTRAVENOUS
Status: DISCONTINUED | OUTPATIENT
Start: 2023-09-23 | End: 2023-09-23

## 2023-09-23 RX ORDER — HEPARIN SODIUM 5000 [USP'U]/ML
60 INJECTION, SOLUTION INTRAVENOUS; SUBCUTANEOUS AS NEEDED
Status: DISCONTINUED | OUTPATIENT
Start: 2023-09-23 | End: 2023-09-23

## 2023-09-23 RX ORDER — METOPROLOL TARTRATE 50 MG/1
100 TABLET, FILM COATED ORAL EVERY 12 HOURS SCHEDULED
Status: DISCONTINUED | OUTPATIENT
Start: 2023-09-23 | End: 2023-09-27 | Stop reason: HOSPADM

## 2023-09-23 RX ORDER — HEPARIN SODIUM 5000 [USP'U]/ML
30 INJECTION, SOLUTION INTRAVENOUS; SUBCUTANEOUS AS NEEDED
Status: DISCONTINUED | OUTPATIENT
Start: 2023-09-23 | End: 2023-09-23

## 2023-09-23 RX ORDER — HEPARIN SODIUM 5000 [USP'U]/ML
18 INJECTION, SOLUTION INTRAVENOUS; SUBCUTANEOUS AS NEEDED
Status: DISCONTINUED | OUTPATIENT
Start: 2023-09-23 | End: 2023-09-23

## 2023-09-23 RX ADMIN — Medication 10 ML: at 20:48

## 2023-09-23 RX ADMIN — GADOTERIDOL 14 ML: 279.3 INJECTION, SOLUTION INTRAVENOUS at 16:02

## 2023-09-23 RX ADMIN — APIXABAN 5 MG: 5 TABLET, FILM COATED ORAL at 18:48

## 2023-09-23 RX ADMIN — AMLODIPINE BESYLATE 10 MG: 10 TABLET ORAL at 08:57

## 2023-09-23 RX ADMIN — SODIUM CHLORIDE 5 MG/HR: 900 INJECTION, SOLUTION INTRAVENOUS at 05:38

## 2023-09-23 RX ADMIN — CEFTRIAXONE SODIUM 1000 MG: 1 INJECTION, POWDER, FOR SOLUTION INTRAMUSCULAR; INTRAVENOUS at 11:58

## 2023-09-23 RX ADMIN — HEPARIN SODIUM 2000 UNITS: 5000 INJECTION INTRAVENOUS; SUBCUTANEOUS at 17:23

## 2023-09-23 RX ADMIN — METOPROLOL TARTRATE 100 MG: 100 TABLET, FILM COATED ORAL at 20:48

## 2023-09-23 RX ADMIN — HEPARIN SODIUM 2000 UNITS: 5000 INJECTION INTRAVENOUS; SUBCUTANEOUS at 06:57

## 2023-09-23 RX ADMIN — DOCUSATE SODIUM 50 MG AND SENNOSIDES 8.6 MG 2 TABLET: 8.6; 5 TABLET, FILM COATED ORAL at 08:58

## 2023-09-23 RX ADMIN — METOPROLOL TARTRATE 100 MG: 100 TABLET, FILM COATED ORAL at 08:57

## 2023-09-23 RX ADMIN — FAMOTIDINE 40 MG: 40 TABLET, FILM COATED ORAL at 08:57

## 2023-09-23 RX ADMIN — DOCUSATE SODIUM 50 MG AND SENNOSIDES 8.6 MG 2 TABLET: 8.6; 5 TABLET, FILM COATED ORAL at 20:48

## 2023-09-23 NOTE — PROGRESS NOTES
Spoke to son Bruce and decided to start anticoagulation orally, apixaban ordered and heparin drip stopped

## 2023-09-23 NOTE — PROGRESS NOTES
TWO PATIENT IDENTIFIERS WERE USED. THE PATIENT WAS DRAPED WITH A FULL BODY DRAPE AND THE PATIENT'S RIGHT ARM WAS PREPPED WITH CHLORA PREP. ULTRASOUND WAS USED TO LOCALIZE THERIGHT BASILIC VEIN. SUBCUTANEOUS TISSUE AT THE CATHETER SITE WAS INFILTRATED WITH 2% LIDOCAINE. UNDER ULTRASOUND GUIDANCE, THE VEIN WAS ACCESSED WITH A 21 GAUGE  NEEDLE. AN 0.018 WIRE WAS THEN THREADED THROUGH THE NEEDLE. THE 21 GAUGE NEEDLE WAS REMOVED AND A 4 Portuguese SHEATH WAS PLACED OVER THE WIRE INTO THE VEIN.THE MIDLINE CATHETER WAS TRIMMED TO 20CM. THE MIDLINE CATHETER WAS THEN PLACED OVER THE WIRE INTO THE VEIN, THE SHEATH WAS PEELED AWAY, WIRE WAS REMOVED. CATHETER WAS FLUSHED WITH NORMAL SALINE AND CATHETER TIP APPLIED. BIOPATCH PLACED. CATHETER SECURED WITH STAT LOCK AND TEGADERM. PATIENT TOLERATED PROCEDURE WELL. THIS WAS DONE AT BEDSIDE      IMPRESSION:SUCCESSFUL PLACEMENT OF DUAL LUMEN MIDLINE.           Yokasta Benoit  9/23/2023  10:29 CDT

## 2023-09-23 NOTE — PLAN OF CARE
Goal Outcome Evaluation:  Plan of Care Reviewed With: patient, spouse        Progress: improving  Outcome Evaluation: Alert and oriented.  using bedpan and calling for assistance.  VSS with meds at current rate.  pulse rate up with movement but down when settled.  Reoriented during shift when aroused for care but voiced understanding and cooperative after orientation.

## 2023-09-23 NOTE — PLAN OF CARE
Problem: Fall Injury Risk  Goal: Absence of Fall and Fall-Related Injury  9/23/2023 0625 by Lenore Lorenzana RN  Outcome: Ongoing, Progressing   Goal Outcome Evaluation:  Plan of Care Reviewed With: patient, spouse        Progress: improving  Outcome Evaluation: Alert and oriented.  using bedpan and calling for assistance.  VSS with meds at current rate.  pulse rate up with movement but down when settled.  Reoriented during shift when aroused for care but voiced understanding and cooperative after orientation.

## 2023-09-23 NOTE — PROGRESS NOTES
[facility] Cardiology  INPATIENT PROGRESS NOTE    Name: Eloisa Chang  Age/Sex: 83 y.o. female  :  1940        PCP: Kush Newman MD    Vital Signs  Temp:  [97.6 °F (36.4 °C)-98.3 °F (36.8 °C)] 98.3 °F (36.8 °C)  Heart Rate:  [] 73  Resp:  [16-20] 20  BP: (111-165)/(56-86) 125/60  Body mass index is 22.58 kg/m².      Subjective   Patient feels well denies any chest pain palpitations or shortness of breath      Rhabdomyolysis      Past Medical History:   Diagnosis Date    Diabetes mellitus     Hypertension     Stroke        Current Facility-Administered Medications   Medication Dose Route Frequency Provider Last Rate Last Admin    amLODIPine (NORVASC) tablet 10 mg  10 mg Oral Q24H Alex Salcedo MD   10 mg at 23 0857    sennosides-docusate (PERICOLACE) 8.6-50 MG per tablet 2 tablet  2 tablet Oral BID Gabriele Franco MD   2 tablet at 23 0858    And    polyethylene glycol (MIRALAX) packet 17 g  17 g Oral Daily PRN Gabriele Franco MD        And    bisacodyl (DULCOLAX) EC tablet 5 mg  5 mg Oral Daily PRN Gabriele Franco MD        And    bisacodyl (DULCOLAX) suppository 10 mg  10 mg Rectal Daily PRN Gabriele Franco MD        Calcium Replacement - Follow Nurse / BPA Driven Protocol   Does not apply PRN Gabriele Franco MD        cefTRIAXone (ROCEPHIN) 1000 mg/100 mL 0.9% NS (MBP)  1,000 mg Intravenous Q24H Gabriele Franco MD   1,000 mg at 23 1158    dilTIAZem (CARDIZEM) 100 mg in 100 mL NS infusion (ADV)  5-15 mg/hr Intravenous Titrated Gabriele Franco MD 5 mL/hr at 23 0538 5 mg/hr at 23 0538    famotidine (PEPCID) tablet 40 mg  40 mg Oral Daily Gabriele Franco MD   40 mg at 23 0857    heparin 47019 units/250 mL (100 units/mL) in 0.45 % NaCl infusion  16 Units/kg/hr Intravenous Titrated Alex Salcedo MD 10.46 mL/hr at 2356 16 Units/kg/hr at 23    And    heparin (porcine) 5000 UNIT/ML injection 3,900 Units  60 Units/kg  Intravenous PRN Alex Salcedo MD        And    heparin (porcine) 5000 UNIT/ML injection 2,000 Units  30 Units/kg Intravenous PRN Alex Salcedo MD   2,000 Units at 09/23/23 0657    Magnesium Standard Dose Replacement - Follow Nurse / BPA Driven Protocol   Does not apply Gabriele Britton MD        metoprolol tartrate (LOPRESSOR) tablet 100 mg  100 mg Oral Q12H Nuno Liu MD   100 mg at 09/23/23 0857    nitroglycerin (NITROSTAT) SL tablet 0.4 mg  0.4 mg Sublingual Q5 Min PRN Gabriele Franco MD        ondansetron (ZOFRAN) tablet 4 mg  4 mg Oral Q6H PRN Gabriele Franco MD        Or    ondansetron (ZOFRAN) injection 4 mg  4 mg Intravenous Q6H PRGabriele Mcdonald MD        Phosphorus Replacement - Follow Nurse / BPA Driven Protocol   Does not apply Gabriele Britton MD        Potassium Replacement - Follow Nurse / BPA Driven Protocol   Does not apply Gabriele Britton MD        sodium chloride 0.9 % flush 10 mL  10 mL Intravenous Q12H Gabriele Franco MD   10 mL at 09/22/23 0828    sodium chloride 0.9 % flush 10 mL  10 mL Intravenous PRN Gabriele Franco MD        sodium chloride 0.9 % infusion 40 mL  40 mL Intravenous PRN Gabriele Franco MD           Past Surgical History:   Procedure Laterality Date    HYSTERECTOMY         Social History     Socioeconomic History    Marital status:    Tobacco Use    Smoking status: Former     Types: Cigarettes    Smokeless tobacco: Never   Vaping Use    Vaping Use: Never used   Substance and Sexual Activity    Alcohol use: Never    Drug use: Never    Sexual activity: Not Currently         Physical Exam  Constitutional:       Appearance: Normal appearance.   HENT:      Head: Normocephalic.   Cardiovascular:      Rate and Rhythm: Tachycardia present. Rhythm irregular.      Heart sounds: Normal heart sounds.   Pulmonary:      Breath sounds: Normal breath sounds.   Abdominal:      Palpations: Abdomen is soft.   Skin:     General: Skin is warm.    Neurological:      General: No focal deficit present.      Mental Status: She is alert.   Psychiatric:         Mood and Affect: Mood normal.         Behavior: Behavior normal.           DATA REVIEWED:     Telemetry showing atrial fibrillation in the 120s    ECG/EMG Results (all)       Procedure Component Value Units Date/Time    ECG 12 Lead Other; fall [[id number]] Collected: 09/21/23 1250     Updated: 09/21/23 1256     QT Interval 330 ms      QTC Interval 454 ms     Narrative:      Test Reason : Other~  Blood Pressure :   */*   mmHG  Vent. Rate : 114 BPM     Atrial Rate : 100 BPM     P-R Int :   * ms          QRS Dur :  72 ms      QT Int : 330 ms       P-R-T Axes :   *  73 127 degrees     QTc Int : 454 ms    Atrial fibrillation with rapid ventricular response  Nonspecific ST and T wave abnormality , probably digitalis effect  Abnormal ECG  No previous ECGs available    Referred By:            Confirmed By:     ECG 12 Lead [[id number]]  (Abnormal) Resulted: 09/21/23 1728     Updated: 09/21/23 1728    Adult Transthoracic Echo Complete w/ Color, Spectral and Contrast if necessary per protocol [[id number]] Resulted: 09/22/23 1047     Updated: 09/22/23 1053     EF(MOD-bp) 44.6 %      LVIDd 4.6 cm      LVIDs 3.1 cm      IVSd 0.68 cm      LVPWd 0.74 cm      FS 32.9 %      IVS/LVPW 0.92 cm      ESV(cubed) 28.7 ml      LV Sys Vol (BSA corrected) 21.3 cm2      EDV(cubed) 95.1 ml      LV Ramirez Vol (BSA corrected) 41.0 cm2      LVOT area 2.41 cm2      LV mass(C)d 100.3 grams      LVOT diam 1.75 cm      EDV(MOD-sp2) 73.2 ml      EDV(MOD-sp4) 72.1 ml      ESV(MOD-sp2) 43.5 ml      ESV(MOD-sp4) 37.5 ml      SV(MOD-sp2) 29.7 ml      SV(MOD-sp4) 34.6 ml      SI(MOD-sp2) 16.9 ml/m2      SI(MOD-sp4) 19.7 ml/m2      EF(MOD-sp2) 40.6 %      EF(MOD-sp4) 48.0 %      LA ESV Index (BP) 33.1 ml/m2      SV(LVOT) 47.3 ml      RVIDd 2.14 cm      TAPSE (>1.6) 2.03 cm      LA dimension (2D)  3.5 cm      LV V1 max 119.0 cm/sec      LV V1 max PG 5.7  mmHg      LV V1 mean PG 3.0 mmHg      LV V1 VTI 19.6 cm      Ao pk rickey 181.5 cm/sec      Ao max PG 13.2 mmHg      Ao mean PG 7.3 mmHg      Ao V2 VTI 30.1 cm      CHUCK(I,D) 1.57 cm2      MV max PG 7.0 mmHg      MV mean PG 4.0 mmHg      MV V2 VTI 18.4 cm      MV P1/2t 40.1 msec      MVA(P1/2t) 5.5 cm2      MVA(VTI) 2.6 cm2      MV dec slope 928.0 cm/sec2      MR max rickey 526.9 cm/sec      MR max .5 mmHg      TR max rickey 288.3 cm/sec      TR max PG 33.3 mmHg      PA V2 max 111.3 cm/sec      Ao root diam 3.4 cm      ACS 2.15 cm      RVSP(TR) 36.3 mmHg      RAP systole 3.0 mmHg     Narrative:        Left ventricular systolic function is mildly decreased. Left   ventricular ejection fraction appears to be 46 - 50%.    Left ventricular diastolic function is consistent with (grade II w/high   LAP) pseudonormalization.    The left atrial cavity is mildly dilated.    Moderate tricuspid valve regurgitation is present.    Estimated right ventricular systolic pressure from tricuspid   regurgitation is mildly elevated (35-45 mmHg).      SCANNED EKG [[id number]] Resulted: 09/21/23     Updated: 09/22/23 1327    SCANNED EKG [[id number]] Resulted: 09/21/23     Updated: 09/22/23 1328    SCANNED EKG [[id number]] Resulted: 09/21/23     Updated: 09/22/23 1330    SCANNED EKG [[id number]] Resulted: 09/21/23     Updated: 09/22/23 2006          ---------------------------------------------------  TTE/VINNY:  Results for orders placed during the hospital encounter of 09/21/23    Adult Transthoracic Echo Complete w/ Color, Spectral and Contrast if necessary per protocol    Interpretation Summary    Left ventricular systolic function is mildly decreased. Left ventricular ejection fraction appears to be 46 - 50%.    Left ventricular diastolic function is consistent with (grade II w/high LAP) pseudonormalization.    The left atrial cavity is mildly dilated.    Moderate tricuspid valve regurgitation is present.    Estimated right ventricular systolic  pressure from tricuspid regurgitation is mildly elevated (35-45 mmHg).        Lab Results (last 24 hours)       Procedure Component Value Units Date/Time    aPTT [[id number]]  (Normal) Collected: 09/22/23 1308    Specimen: Blood Updated: 09/22/23 1407     PTT 30.0 seconds     Narrative:      The recommended Heparin therapeutic range is 68-97 seconds.    aPTT [[id number]]  (Normal) Collected: 09/22/23 2031    Specimen: Blood Updated: 09/22/23 2144     PTT 36.7 seconds     Narrative:      The recommended Heparin therapeutic range is 68-97 seconds.    CBC & Differential [[id number]]  (Abnormal) Collected: 09/23/23 0418    Specimen: Blood Updated: 09/23/23 0442    Narrative:      The following orders were created for panel order CBC & Differential.  Procedure                               Abnormality         Status                     ---------                               -----------         ------                     CBC Auto Differential[[id number]]        Abnormal            Final result               Scan Slide[[id number]]                                                                    Please view results for these tests on the individual orders.    Basic Metabolic Panel [[id number]]  (Abnormal) Collected: 09/23/23 0418    Specimen: Blood Updated: 09/23/23 0457     Glucose 145 mg/dL      BUN 17 mg/dL      Creatinine 0.82 mg/dL      Sodium 137 mmol/L      Potassium 4.3 mmol/L      Chloride 106 mmol/L      CO2 22.0 mmol/L      Calcium 8.3 mg/dL      BUN/Creatinine Ratio 20.7     Anion Gap 9.0 mmol/L      eGFR 71.5 mL/min/1.73     Narrative:      GFR Normal >60  Chronic Kidney Disease <60  Kidney Failure <15    The GFR formula is only valid for adults with stable renal function between ages 18 and 70.    CBC Auto Differential [[id number]]  (Abnormal) Collected: 09/23/23 0418    Specimen: Blood Updated: 09/23/23 0442     WBC 14.03 10*3/mm3      RBC 3.63 10*6/mm3      Hemoglobin 10.1 g/dL      Hematocrit 30.6 %      MCV  84.3 fL      MCH 27.8 pg      MCHC 33.0 g/dL      RDW 14.2 %      RDW-SD 43.9 fl      MPV 8.7 fL      Platelets 531 10*3/mm3      Neutrophil % 67.5 %      Lymphocyte % 17.8 %      Monocyte % 6.8 %      Eosinophil % 2.5 %      Basophil % 0.7 %      Immature Grans % 4.7 %      Neutrophils, Absolute 9.46 10*3/mm3      Lymphocytes, Absolute 2.50 10*3/mm3      Monocytes, Absolute 0.96 10*3/mm3      Eosinophils, Absolute 0.35 10*3/mm3      Basophils, Absolute 0.10 10*3/mm3      Immature Grans, Absolute 0.66 10*3/mm3      nRBC 0.0 /100 WBC     aPTT [[id number]]  (Abnormal) Collected: 09/23/23 0418    Specimen: Blood Updated: 09/23/23 0448     PTT 45.0 seconds     Narrative:      The recommended Heparin therapeutic range is 68-97 seconds.              A/P    Paroxysmal atrial fibrillation  Rhabdomyolysis  JASMEET    Rapid ventricular rates in atrial fibrillation.  Given her history of stroke her [id number] score is elevated at 6.  We discussed benefits and risks of anticoagulation.  Family continues to discuss and think about decision on initiation of anticoagulation for her.  We will continue to titrate up her metoprolol to 100 every 12.  Can wean diltiazem drip as tolerated.  Echocardiogram showing reduced EF with septal wall motion abnormalities.  These may be due to tachycardia induced cardiomyopathy versus from obstructive coronary artery disease.  Can consider ischemic investigations as an outpatient.              Part of this note may be an electronic transcription/translation of spoken language to printed text using the Dragon Dictation System.

## 2023-09-23 NOTE — PROGRESS NOTES
[facility] Medicine Services  INPATIENT PROGRESS NOTE    Length of Stay: 0  Date of Admission: 9/21/2023  Primary Care Physician: Kush Newman MD    Subjective   Chief Complaint: sp fall at home  HPI:   83-year-old male comes to the ER from home after being found on the floor of her bathroom. Spoke to her sons Bruce and Bao, they found her on the bathroom floor, maybe after 36 hours. Today son says she complained of numbness left forearm 3 days ago.  Patient does not remember what happened, now more oriented and denies chest pain, no shortness of breath, no fever, no urinary or GI symptoms. Family thinks she was on the ground for 2 nights. She is complaining of left hip pain. PMH: Type 2 diabetes, hyperlipidemia, essential hypertension, osteoporosis.    As of today, 09/23/23  Review of Systems   Constitutional:  Negative for activity change, appetite change, chills, diaphoresis and fatigue.   HENT:  Negative for congestion, ear discharge, hearing loss, rhinorrhea, sinus pain, sneezing and sore throat.    Eyes:  Negative for photophobia, discharge and visual disturbance.   Respiratory:  Negative for cough, chest tightness, shortness of breath and wheezing.    Cardiovascular:  Negative for chest pain and palpitations.   Gastrointestinal:  Negative for abdominal distention, abdominal pain, blood in stool, diarrhea, nausea and vomiting.   Endocrine: Negative for cold intolerance, heat intolerance, polydipsia, polyphagia and polyuria.   Genitourinary:  Negative for dysuria, flank pain, hematuria and urgency.   Musculoskeletal:  Negative for arthralgias, joint swelling and myalgias.   Skin:  Negative for color change.   Allergic/Immunologic: Negative for food allergies.   Neurological:  Negative for dizziness, seizures, syncope, speech difficulty, weakness and headaches.   Hematological:  Negative for adenopathy. Does not bruise/bleed easily.    Psychiatric/Behavioral:  Negative for confusion, hallucinations, self-injury and suicidal ideas. The patient is not nervous/anxious.       All pertinent negatives and positives are as above. All other systems have been reviewed and are negative unless otherwise stated.    Objective    Temp:  [97.6 °F (36.4 °C)-98.3 °F (36.8 °C)] 98.3 °F (36.8 °C)  Heart Rate:  [] 73  Resp:  [16-20] 20  BP: (111-165)/(56-86) 125/60         As of today, 09/23/23  Physical Exam  Constitutional:       Appearance: Normal appearance.   HENT:      Head: Normocephalic and atraumatic.      Right Ear: Tympanic membrane normal.      Left Ear: Tympanic membrane normal.      Nose: Nose normal.      Mouth/Throat:      Mouth: Mucous membranes are moist.   Eyes:      Pupils: Pupils are equal, round, and reactive to light.   Cardiovascular:      Rate and Rhythm: Normal rate and regular rhythm.      Pulses: Normal pulses.      Heart sounds: Normal heart sounds.   Pulmonary:      Effort: Pulmonary effort is normal.      Breath sounds: Normal breath sounds.   Abdominal:      General: Abdomen is flat. Bowel sounds are normal.      Palpations: Abdomen is soft.   Musculoskeletal:         General: Normal range of motion.      Cervical back: Normal range of motion and neck supple.   Skin:     General: Skin is warm and dry.      Capillary Refill: Capillary refill takes less than 2 seconds.      Comments: Bruises in forehead, hips and knees   Neurological:      General: No focal deficit present.      Mental Status: She is alert. She is disoriented.      Comments: Today disoriented and does not remember episode that put her in the hospital, oriented to time though   Psychiatric:         Mood and Affect: Mood normal.         Behavior: Behavior normal.         Thought Content: Thought content normal.         Judgment: Judgment normal.         Results Review:  I have reviewed the labs, radiology results, and diagnostic studies.    Laboratory Data:    Results from last 7 days   Lab Units 09/23/23  0418 09/22/23  0534 09/21/23  1421   SODIUM mmol/L 137 139 140   POTASSIUM mmol/L 4.3 4.2  4.2 3.4*   CHLORIDE mmol/L 106 108* 104   CO2 mmol/L 22.0 21.0* 21.0*   BUN mg/dL 17 23 31*   CREATININE mg/dL 0.82 0.80 1.33*   GLUCOSE mg/dL 145* 154* 122*   CALCIUM mg/dL 8.3* 8.0* 8.3*   BILIRUBIN mg/dL  --  0.4 0.6   ALK PHOS U/L  --  64 65   ALT (SGPT) U/L  --  38* 41*   AST (SGOT) U/L  --  51* 72*   ANION GAP mmol/L 9.0 10.0 15.0     Estimated Creatinine Clearance: 54.6 mL/min (by C-G formula based on SCr of 0.82 mg/dL).  Results from last 7 days   Lab Units 09/21/23  1421   MAGNESIUM mg/dL 1.9         Results from last 7 days   Lab Units 09/23/23  0418 09/22/23  0534 09/21/23  1421   WBC 10*3/mm3 14.03* 11.96* 15.49*   HEMOGLOBIN g/dL 10.1* 10.2* 10.7*   HEMATOCRIT % 30.6* 30.5* 31.0*   PLATELETS 10*3/mm3 531* 452* 419     Results from last 7 days   Lab Units 09/22/23  0534   INR  1.26*       Culture Data:   Blood Culture   Date Value Ref Range Status   09/21/2023 No growth at 24 hours  Preliminary   09/21/2023 No growth at 24 hours  Preliminary     Urine Culture   Date Value Ref Range Status   09/21/2023 Culture in progress  Preliminary     No results found for: RESPCX  No results found for: WOUNDCX  No results found for: STOOLCX  No components found for: BODYFLD    Radiology Data:   Imaging Results (Last 24 Hours)       Procedure Component Value Units Date/Time    XR Knee 3 View Bilateral [[id number]] Collected: 09/23/23 1132     Updated: 09/23/23 1221    Narrative:      INDICATION:  sp fall.    COMPARISON:  None relevant.    FINDINGS:  3 views of both knees were obtained.  Neither knee shows acute fracture,  dislocation or malalignment.  Severe degenerative changes throughout the right  knee.    US Guided Vascular Access [[id number]] Collected: 09/23/23 1052     Updated: 09/23/23 1217    Narrative:      Ultrasound guidance vascular    FINDINGS:  Real-time ultrasound  imaging was provided for vascular access.  Please refer to  procedure note for real-time exam findings.  The right basilic vein was found to  be patent and compressible.  Access under direct sonographic visualization.  Permanent saved images sent to the PACS for documentation.      IR Insert Midline Without Port Pump 5 Plus [[id number]] Resulted: 09/23/23 1029     Updated: 09/23/23 1029    Narrative:      This procedure was auto-finalized with no dictation required.    XR Hand 2 View Left [[id number]] Collected: 09/22/23 1407     Updated: 09/22/23 1413    Narrative:      TECHNIQUE:  PA, oblique and lateral views of the left hand    HISTORY:  Pain and swelling.    COMPARISON:  None.    FINDINGS:  Prominent arthritis at the index DIP joint, probably degenerative.  Less severe  joint space narrowing throughout the remainder of the IP joints of the fingers  and thumb.  No erosion.  Diffuse osteopenia.  No fracture is seen.    Please refer to separate dictation of the wrist.        XR Forearm 2 View Left [[id number]] Collected: 09/22/23 1352     Updated: 09/22/23 1355    Narrative:      History: pain    COMPARISON: None    FINDINGS:  AP and lateral views were obtained.    There is no fracture, dislocation or osseous destruction.        Impression:      No acute abnormality.      XR Wrist 2 View Left [[id number]] Collected: 09/22/23 1337     Updated: 09/22/23 1342    Narrative:      XR WRIST LEFT 2 VIEWS    HISTORY: pain    COMPARISON: None    FINDINGS:      No significant soft tissue swelling.    No fracture or dislocation.    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          XR Pelvis 1 or 2 View [[id number]] Collected: 09/22/23 1253     Updated: 09/22/23 1258    Narrative:      XR PELVIS 1 OR 2 VIEWS    HISTORY: sp fall    COMPARISON: None    FINDINGS:  There is no evidence of fracture or dislocation.    The joint spaces are  maintained.    Low-lying bladder I cannot exclude partial prolapse..          Impression:      1. No acute fracture. Low-lying bladder. I cannot exclude partial prolapse..                I have utilized all available immediate resources to obtain, update, or review the patient's current medications (including all prescriptions, over-the-counter products, herbals, cannabis/cannabidiol products, and vitamin/mineral/dietary (nutritional) supplements).       Assessment/Plan     Active Hospital Problems    Diagnosis     **Rhabdomyolysis          Assessment and Plan: 83 years old male patient found on the floor after 2 days, probable cardiogenic syncope, afib RVR. Had renal failure, rhabdomyolysis. Hypokalemia. Leucocytosis from UTI.  Now on full anticoagulation and rate control with cardizem drip.  Problems:  1-Atrial fibrillation with RVR, Cardizem drip. Probable Afib RVR at home that caused syncope. Lopressor  2-Rhabdomyolysis, am labs, improving  3-Renal failure, prerenal, IVF, improved  4-UTI. High WBC, to follow, urine and blood cultures in process, abnormal urine analysis. UTI. Started Rocephin  5-Hypokalemia, resolved  6-Old T12 and right ribs fractures, underlined osteoporosis  7-Frequent falls, PT OT, X rays knees  8-HFpEF, left atrium enlargement, tricuspid regurgitation, group 2 pulmonary hypertension, cardiology on board    Medical Decision Making  Number and Complexity of problems: 4  Differential Diagnosis: atrial fibrillation with RVR, UTI    Conditions and Status:        Condition is improving.     [facility] Data  External documents reviewed: previous medical records  My EKG interpretation: atrial fibrillation  My CT interpretation: chest right ribs cage fractures, T12 compression fracture  Tests considered but not ordered: none     Decision rules/scores evaluated (example [id number], Wells, etc): 7     Discussed with: the patient     Treatment Plan  Pending to decide by family anticoagulation  Continue  CCU  Pending MRI brain  Continue Rocephin  Appreciated cardiology Consult   Continue Heparin drip  Lopressor added yesterday    Care Planning  Shared decision making: sons  Code status and discussions: full code    Disposition  Social Determinants of Health that impact treatment or disposition: none  I expect the patient to be discharged to home in 3 days.       I confirmed that the patient's Advance Care Plan is present, code status is documented, or surrogate decision maker is listed in the patient's medical record.      This document has been electronically signed by Gabriele Franco MD on September 23, 2023 12:23 CDT

## 2023-09-24 LAB
ANION GAP SERPL CALCULATED.3IONS-SCNC: 10 MMOL/L (ref 5–15)
BUN SERPL-MCNC: 14 MG/DL (ref 8–23)
BUN/CREAT SERPL: 20 (ref 7–25)
CALCIUM SPEC-SCNC: 8.1 MG/DL (ref 8.6–10.5)
CHLORIDE SERPL-SCNC: 105 MMOL/L (ref 98–107)
CO2 SERPL-SCNC: 22 MMOL/L (ref 22–29)
CREAT SERPL-MCNC: 0.7 MG/DL (ref 0.57–1)
EGFRCR SERPLBLD CKD-EPI 2021: 86.5 ML/MIN/1.73
GLUCOSE SERPL-MCNC: 134 MG/DL (ref 65–99)
POTASSIUM SERPL-SCNC: 3.4 MMOL/L (ref 3.5–5.2)
POTASSIUM SERPL-SCNC: 4.6 MMOL/L (ref 3.5–5.2)
SODIUM SERPL-SCNC: 137 MMOL/L (ref 136–145)
WHOLE BLOOD HOLD SPECIMEN: NORMAL

## 2023-09-24 PROCEDURE — 25010000002 CEFTRIAXONE PER 250 MG

## 2023-09-24 PROCEDURE — 80048 BASIC METABOLIC PNL TOTAL CA: CPT

## 2023-09-24 PROCEDURE — 84132 ASSAY OF SERUM POTASSIUM: CPT | Performed by: FAMILY MEDICINE

## 2023-09-24 RX ORDER — POTASSIUM CHLORIDE 20 MEQ/1
40 TABLET, EXTENDED RELEASE ORAL EVERY 4 HOURS
Status: COMPLETED | OUTPATIENT
Start: 2023-09-24 | End: 2023-09-24

## 2023-09-24 RX ADMIN — FAMOTIDINE 40 MG: 40 TABLET, FILM COATED ORAL at 08:52

## 2023-09-24 RX ADMIN — Medication 10 ML: at 21:42

## 2023-09-24 RX ADMIN — METOPROLOL TARTRATE 100 MG: 100 TABLET, FILM COATED ORAL at 08:52

## 2023-09-24 RX ADMIN — POTASSIUM CHLORIDE 40 MEQ: 20 TABLET, EXTENDED RELEASE ORAL at 11:34

## 2023-09-24 RX ADMIN — APIXABAN 5 MG: 5 TABLET, FILM COATED ORAL at 08:52

## 2023-09-24 RX ADMIN — CEFTRIAXONE SODIUM 1000 MG: 1 INJECTION, POWDER, FOR SOLUTION INTRAMUSCULAR; INTRAVENOUS at 12:11

## 2023-09-24 RX ADMIN — APIXABAN 5 MG: 5 TABLET, FILM COATED ORAL at 21:41

## 2023-09-24 RX ADMIN — METOPROLOL TARTRATE 100 MG: 100 TABLET, FILM COATED ORAL at 21:41

## 2023-09-24 RX ADMIN — DOCUSATE SODIUM 50 MG AND SENNOSIDES 8.6 MG 2 TABLET: 8.6; 5 TABLET, FILM COATED ORAL at 08:53

## 2023-09-24 RX ADMIN — Medication 10 ML: at 08:53

## 2023-09-24 RX ADMIN — AMLODIPINE BESYLATE 10 MG: 10 TABLET ORAL at 08:52

## 2023-09-24 RX ADMIN — POTASSIUM CHLORIDE 40 MEQ: 20 TABLET, EXTENDED RELEASE ORAL at 06:38

## 2023-09-24 NOTE — PROGRESS NOTES
[facility] Medicine Services  INPATIENT PROGRESS NOTE    Length of Stay: 1  Date of Admission: 9/21/2023  Primary Care Physician: Kush Newman MD    Subjective   Chief Complaint: sp fall at home, persistently disoriented  HPI:  83-year-old male comes to the ER from home after being found on the floor of her bathroom. Spoke to her sons Bruce and Bao, they found her on the bathroom floor, maybe after 36 hours. Today son says she complained of numbness left forearm 3 days ago.  Patient does not remember what happened, now more oriented and denies chest pain, no shortness of breath, no fever, no urinary or GI symptoms. Family thinks she was on the ground for 2 nights. She is complaining of left hip pain. PMH: Type 2 diabetes, hyperlipidemia, essential hypertension, osteoporosis.    As of today, 09/24/23  Review of Systems   Constitutional:  Negative for activity change, appetite change, chills, diaphoresis and fatigue.   HENT:  Negative for congestion, ear discharge, hearing loss, rhinorrhea, sinus pain, sneezing and sore throat.    Eyes:  Negative for photophobia, discharge and visual disturbance.   Respiratory:  Negative for cough, chest tightness, shortness of breath and wheezing.    Cardiovascular:  Negative for chest pain and palpitations.   Gastrointestinal:  Negative for abdominal distention, abdominal pain, blood in stool, diarrhea, nausea and vomiting.   Endocrine: Negative for cold intolerance, heat intolerance, polydipsia, polyphagia and polyuria.   Genitourinary:  Negative for dysuria, flank pain, hematuria and urgency.   Musculoskeletal:  Negative for arthralgias, joint swelling and myalgias.   Skin:  Negative for color change.   Allergic/Immunologic: Negative for food allergies.   Neurological:  Negative for dizziness, seizures, syncope, speech difficulty, weakness and headaches.   Hematological:  Negative for adenopathy. Does not bruise/bleed  easily.   Psychiatric/Behavioral:  Negative for confusion, hallucinations, self-injury and suicidal ideas. The patient is not nervous/anxious.       All pertinent negatives and positives are as above. All other systems have been reviewed and are negative unless otherwise stated.    Objective    Temp:  [98.1 °F (36.7 °C)-98.4 °F (36.9 °C)] 98.4 °F (36.9 °C)  Heart Rate:  [62-99] 78  Resp:  [16-18] 16  BP: ()/(53-97) 138/65    AM-PAC 6 Clicks Score (PT): 16 (09/23/23 1909)  Patient Vitals for the past 24 hrs:   BP Temp Temp src Pulse Resp SpO2 Weight   09/24/23 0900 138/65 -- -- 78 16 95 % --   09/24/23 0800 130/71 98.4 °F (36.9 °C) Oral 78 16 94 % --   09/24/23 0700 125/67 -- -- 62 -- 96 % --   09/24/23 0600 104/57 -- -- 63 -- 96 % --   09/24/23 0500 113/55 -- -- 72 -- 97 % 66.4 kg (146 lb 6.4 oz)   09/24/23 0400 106/53 -- -- 63 -- 95 % --   09/24/23 0300 123/60 -- -- 78 -- 95 % --   09/24/23 0200 166/78 98.3 °F (36.8 °C) Oral 71 16 95 % --   09/24/23 0100 118/58 -- -- 69 -- 96 % --   09/24/23 0000 137/63 -- -- 76 -- 93 % --   09/23/23 2300 138/73 -- -- 87 -- 94 % --   09/23/23 2200 135/71 -- -- 85 -- 96 % --   09/23/23 2100 124/69 -- -- 97 -- 94 % --   09/23/23 2048 113/58 -- -- 94 -- -- --   09/23/23 2000 113/58 98.1 °F (36.7 °C) Oral 81 18 93 % --   09/23/23 1900 137/70 -- -- 83 -- 93 % --   09/23/23 1825 144/72 -- -- 84 -- 95 % --   09/23/23 1745 123/64 -- -- 99 -- 92 % --   09/23/23 1711 165/97 -- -- 92 -- 94 % --   09/23/23 1630 119/59 -- -- 81 -- 94 % --   09/23/23 1626 143/67 -- -- 77 -- -- --   09/23/23 1600 -- 98.2 °F (36.8 °C) Oral -- -- 96 % --   09/23/23 1500 133/81 -- -- 68 -- 96 % --   09/23/23 1430 133/69 -- -- 69 -- 94 % --   09/23/23 1400 139/59 -- -- 74 -- 96 % --   09/23/23 1331 94/53 -- -- 68 -- 92 % --   09/23/23 1300 132/57 -- -- 85 -- 98 % --   09/23/23 1230 135/63 -- -- 78 -- 94 % --   09/23/23 1200 130/62 -- -- 74 -- 95 % --   09/23/23 1145 130/57 -- -- 81 -- -- --      As of today,  09/24/23  Physical Exam  Constitutional:       Appearance: Normal appearance.   HENT:      Head: Normocephalic and atraumatic.      Right Ear: Tympanic membrane normal.      Left Ear: Tympanic membrane normal.      Nose: Nose normal.      Mouth/Throat:      Mouth: Mucous membranes are moist.   Eyes:      Pupils: Pupils are equal, round, and reactive to light.   Cardiovascular:      Rate and Rhythm: Normal rate and regular rhythm.      Pulses: Normal pulses.      Heart sounds: Normal heart sounds.   Pulmonary:      Effort: Pulmonary effort is normal.      Breath sounds: Normal breath sounds.   Abdominal:      General: Abdomen is flat. Bowel sounds are normal.      Palpations: Abdomen is soft.   Musculoskeletal:         General: Normal range of motion.      Cervical back: Normal range of motion and neck supple.   Skin:     General: Skin is warm and dry.      Capillary Refill: Capillary refill takes less than 2 seconds.      Comments: Bruises in forehead, hips and knees   Neurological:      General: No focal deficit present.      Mental Status: She is alert and oriented to person, place, and time.   Psychiatric:         Mood and Affect: Mood normal.         Behavior: Behavior normal.         Thought Content: Thought content normal.         Judgment: Judgment normal.         Results Review:  I have reviewed the labs, radiology results, and diagnostic studies.    Laboratory Data:   Results from last 7 days   Lab Units 09/24/23  0458 09/23/23  0418 09/22/23  0534 09/21/23  1421   SODIUM mmol/L 137 137 139 140   POTASSIUM mmol/L 3.4* 4.3 4.2  4.2 3.4*   CHLORIDE mmol/L 105 106 108* 104   CO2 mmol/L 22.0 22.0 21.0* 21.0*   BUN mg/dL 14 17 23 31*   CREATININE mg/dL 0.70 0.82 0.80 1.33*   GLUCOSE mg/dL 134* 145* 154* 122*   CALCIUM mg/dL 8.1* 8.3* 8.0* 8.3*   BILIRUBIN mg/dL  --   --  0.4 0.6   ALK PHOS U/L  --   --  64 65   ALT (SGPT) U/L  --   --  38* 41*   AST (SGOT) U/L  --   --  51* 72*   ANION GAP mmol/L 10.0 9.0 10.0  15.0     Estimated Creatinine Clearance: 65 mL/min (by C-G formula based on SCr of 0.7 mg/dL).  Results from last 7 days   Lab Units 09/21/23  1421   MAGNESIUM mg/dL 1.9         Results from last 7 days   Lab Units 09/23/23  0418 09/22/23  0534 09/21/23  1421   WBC 10*3/mm3 14.03* 11.96* 15.49*   HEMOGLOBIN g/dL 10.1* 10.2* 10.7*   HEMATOCRIT % 30.6* 30.5* 31.0*   PLATELETS 10*3/mm3 531* 452* 419     Results from last 7 days   Lab Units 09/22/23  0534   INR  1.26*       Culture Data:   Blood Culture   Date Value Ref Range Status   09/21/2023 No growth at 2 days  Preliminary   09/21/2023 No growth at 2 days  Preliminary     Urine Culture   Date Value Ref Range Status   09/21/2023 >100,000 CFU/mL Mixed Yanelis Isolated  Final     No results found for: RESPCX  No results found for: WOUNDCX  No results found for: STOOLCX  No components found for: BODYFLD    Radiology Data:   Imaging Results (Last 24 Hours)       Procedure Component Value Units Date/Time    MRI Brain With & Without Contrast [[id number]] Collected: 09/23/23 1628     Updated: 09/23/23 1704    Narrative:      MRI brain with and without contrast:    COMPARISON:  No comparison.    History:  Found down after 30 hours.    TECHNIQUE:  Multi-planar, multi-sequence images were obtained through the brain before and  after administration of 14 mL ProHance IV contrast.    FINDINGS:  A tiny single subcentimeter focus of restricted diffusion in the superior left  frontal region.  Scattered foci of restricted diffusion involving the right  frontal, parietal and occipital lobes.    No midline shift, mass effect or hydrocephalus.  Scattered T2 and FLAIR signal  abnormalities in the subcortical and periventricular white matter suggesting  chronic small vessel ischemic changes for age.  The postcontrast sequences are  markedly limited by motion.  No gross intracranial enhancement appreciated.  Large vessels at skull base demonstrate grossly normal signal voids.       Impression:      Impression:    1.  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    2. No midline shift or mass effect.    3.  The postcontrast sequences are markedly limited by motion.  No gross  abnormal intracranial enhancement.    XR Knee 3 View Bilateral [[id number]] Collected: 09/23/23 1132     Updated: 09/23/23 1221    Narrative:      INDICATION:  sp fall.    COMPARISON:  None relevant.    FINDINGS:  3 views of both knees were obtained.  Neither knee shows acute fracture,  dislocation or malalignment.  Severe degenerative changes throughout the right  knee.    US Guided Vascular Access [[id number]] Collected: 09/23/23 1052     Updated: 09/23/23 1217    Narrative:      Ultrasound guidance vascular    FINDINGS:  Real-time ultrasound imaging was provided for vascular access.  Please refer to  procedure note for real-time exam findings.  The right basilic vein was found to  be patent and compressible.  Access under direct sonographic visualization.  Permanent saved images sent to the PACS for documentation.              I have utilized all available immediate resources to obtain, update, or review the patient's current medications (including all prescriptions, over-the-counter products, herbals, cannabis/cannabidiol products, and vitamin/mineral/dietary (nutritional) supplements).       Assessment/Plan     Active Hospital Problems    Diagnosis     **Rhabdomyolysis          Assessment and Plan: 83 years old male patient found on the floor after 2 days, probable cardiogenic syncope, afib RVR. Had renal failure, rhabdomyolysis. Hypokalemia. Leucocytosis from UTI.  Now on full anticoagulation and rate control off  cardizem drip, to transfer to telemetry.  Problems:  1-Atrial fibrillation with RVR, Cardizem drip can be stop. Probable Afib RVR at home that caused syncope. Lopressor  titrated by cardiology, will transfer to telemetry. Discussed with Bruce (son) yesterday and decided for Eliquis to prevent further embolic events  2-Rhabdomyolysis, am labs, improving  3-Renal failure, prerenal, DC IVF, improved. Resolved  4-UTI. High WBC, to follow, urine and blood cultures in process, abnormal urine analysis. UTI. Started Rocephin  5-Hypokalemia, electrolyte replacement protocol  6-Old T12 and right ribs fractures, underlined osteoporosis  7-Frequent falls, PT OT, X rays knees  8-HFpEF, left atrium enlargement, tricuspid regurgitation, group 2 pulmonary hypertension, cardiology on board  9-osteoarthritis of both knees and hands    Medical Decision Making  Number and Complexity of problems: 5 major  Differential Diagnosis: atrial fibrillation with RVR    Conditions and Status:        Condition is improving.     [facility] Data  External documents reviewed: previous medical records  My EKG interpretation: atrial fibrillation  My CT interpretation: chest right ribs cage fractures, T12 compression fracture   Tests considered but not ordered: none     Decision rules/scores evaluated (example [id number], Wells, etc): 7     Discussed with: Bruce her son, has plan to take her to his house     Treatment Plan  Continue Eliquis  Transfer to telemetry    Care Planning  Shared decision making: Son Bruce  Code status and discussions: full code    Disposition  Social Determinants of Health that impact treatment or disposition: none  I expect the patient to be discharged to home in 1 days.       I confirmed that the patient's Advance Care Plan is present, code status is documented, or surrogate decision maker is listed in the patient's medical record.      This document has been electronically signed by Gabriele Franco MD on September 24, 2023 11:14 CDT

## 2023-09-24 NOTE — PROGRESS NOTES
[facility] Cardiology  INPATIENT PROGRESS NOTE    Name: Eloisa Chang  Age/Sex: 83 y.o. female  :  1940        PCP: Kush Newman MD    Vital Signs  Temp:  [98.1 °F (36.7 °C)-98.8 °F (37.1 °C)] 98.8 °F (37.1 °C)  Heart Rate:  [57-99] 66  Resp:  [16-18] 16  BP: ()/(53-97) 140/62  Body mass index is 22.92 kg/m².      Subjective   Patient denies any shortness of breath chest pain or palpitations      Rhabdomyolysis      Past Medical History:   Diagnosis Date    Diabetes mellitus     Hypertension     Stroke        Current Facility-Administered Medications   Medication Dose Route Frequency Provider Last Rate Last Admin    amLODIPine (NORVASC) tablet 10 mg  10 mg Oral Q24H Alex Salcedo MD   10 mg at 23 0852    apixaban (ELIQUIS) tablet 5 mg  5 mg Oral Q12H Gabriele Franco MD   5 mg at 23 0852    sennosides-docusate (PERICOLACE) 8.6-50 MG per tablet 2 tablet  2 tablet Oral BID Gabriele Franco MD   2 tablet at 23 0853    And    polyethylene glycol (MIRALAX) packet 17 g  17 g Oral Daily PRN Gabriele Franco MD        And    bisacodyl (DULCOLAX) EC tablet 5 mg  5 mg Oral Daily PRN Gabriele Franco MD        And    bisacodyl (DULCOLAX) suppository 10 mg  10 mg Rectal Daily PRN Gabriele Franco MD        Calcium Replacement - Follow Nurse / BPA Driven Protocol   Does not apply PRN Gabriele Franco MD        cefTRIAXone (ROCEPHIN) 1000 mg/100 mL 0.9% NS (MBP)  1,000 mg Intravenous Q24H Gabriele Franco MD   1,000 mg at 23 1211    famotidine (PEPCID) tablet 40 mg  40 mg Oral Daily Gabriele Franco MD   40 mg at 23 0852    Magnesium Standard Dose Replacement - Follow Nurse / BPA Driven Protocol   Does not apply PRN Gabriele Franco MD        metoprolol tartrate (LOPRESSOR) tablet 100 mg  100 mg Oral Q12H Nuno Liu MD   100 mg at 23 0852    nitroglycerin (NITROSTAT) SL tablet 0.4 mg  0.4 mg Sublingual Q5 Min PRN Gabriele Franco MD         ondansetron (ZOFRAN) tablet 4 mg  4 mg Oral Q6H PRN Gabriele Franco MD        Or    ondansetron (ZOFRAN) injection 4 mg  4 mg Intravenous Q6H PRN Gabriele Franco MD        Phosphorus Replacement - Follow Nurse / BPA Driven Protocol   Does not apply PRN Gabriele Franco MD        Potassium Replacement - Follow Nurse / BPA Driven Protocol   Does not apply PRGabriele Mcdonald MD        sodium chloride 0.9 % flush 10 mL  10 mL Intravenous Q12H Gabriele Franco MD   10 mL at 09/24/23 0853    sodium chloride 0.9 % flush 10 mL  10 mL Intravenous PRN Gabriele Franco MD        sodium chloride 0.9 % infusion 40 mL  40 mL Intravenous PRN Gabriele Franco MD           Past Surgical History:   Procedure Laterality Date    HYSTERECTOMY         Social History     Socioeconomic History    Marital status:    Tobacco Use    Smoking status: Former     Types: Cigarettes    Smokeless tobacco: Never   Vaping Use    Vaping Use: Never used   Substance and Sexual Activity    Alcohol use: Never    Drug use: Never    Sexual activity: Not Currently         Physical Exam  Constitutional:       Appearance: Normal appearance.   HENT:      Head: Atraumatic.   Eyes:      Extraocular Movements: Extraocular movements intact.   Cardiovascular:      Rate and Rhythm: Normal rate and regular rhythm.      Heart sounds: Normal heart sounds.   Pulmonary:      Effort: Pulmonary effort is normal.      Breath sounds: Normal breath sounds.   Abdominal:      Palpations: Abdomen is soft.   Neurological:      General: No focal deficit present.      Mental Status: She is alert.   Psychiatric:         Mood and Affect: Mood normal.         Behavior: Behavior normal.           DATA REVIEWED:     Telemetry showing atrial fibrillation with normal rates, converted to sinus rhythm    ECG/EMG Results (all)       Procedure Component Value Units Date/Time    ECG 12 Lead Other; fall [[id number]] Collected: 09/21/23 1250     Updated: 09/21/23 1256     QT Interval 330 ms       QTC Interval 454 ms     Narrative:      Test Reason : Other~  Blood Pressure :   */*   mmHG  Vent. Rate : 114 BPM     Atrial Rate : 100 BPM     P-R Int :   * ms          QRS Dur :  72 ms      QT Int : 330 ms       P-R-T Axes :   *  73 127 degrees     QTc Int : 454 ms    Atrial fibrillation with rapid ventricular response  Nonspecific ST and T wave abnormality , probably digitalis effect  Abnormal ECG  No previous ECGs available    Referred By:            Confirmed By:     ECG 12 Lead [[id number]]  (Abnormal) Resulted: 09/21/23 1728     Updated: 09/21/23 1728    Adult Transthoracic Echo Complete w/ Color, Spectral and Contrast if necessary per protocol [[id number]] Resulted: 09/22/23 1047     Updated: 09/22/23 1053     EF(MOD-bp) 44.6 %      LVIDd 4.6 cm      LVIDs 3.1 cm      IVSd 0.68 cm      LVPWd 0.74 cm      FS 32.9 %      IVS/LVPW 0.92 cm      ESV(cubed) 28.7 ml      LV Sys Vol (BSA corrected) 21.3 cm2      EDV(cubed) 95.1 ml      LV Ramirez Vol (BSA corrected) 41.0 cm2      LVOT area 2.41 cm2      LV mass(C)d 100.3 grams      LVOT diam 1.75 cm      EDV(MOD-sp2) 73.2 ml      EDV(MOD-sp4) 72.1 ml      ESV(MOD-sp2) 43.5 ml      ESV(MOD-sp4) 37.5 ml      SV(MOD-sp2) 29.7 ml      SV(MOD-sp4) 34.6 ml      SI(MOD-sp2) 16.9 ml/m2      SI(MOD-sp4) 19.7 ml/m2      EF(MOD-sp2) 40.6 %      EF(MOD-sp4) 48.0 %      LA ESV Index (BP) 33.1 ml/m2      SV(LVOT) 47.3 ml      RVIDd 2.14 cm      TAPSE (>1.6) 2.03 cm      LA dimension (2D)  3.5 cm      LV V1 max 119.0 cm/sec      LV V1 max PG 5.7 mmHg      LV V1 mean PG 3.0 mmHg      LV V1 VTI 19.6 cm      Ao pk rickey 181.5 cm/sec      Ao max PG 13.2 mmHg      Ao mean PG 7.3 mmHg      Ao V2 VTI 30.1 cm      CHUCK(I,D) 1.57 cm2      MV max PG 7.0 mmHg      MV mean PG 4.0 mmHg      MV V2 VTI 18.4 cm      MV P1/2t 40.1 msec      MVA(P1/2t) 5.5 cm2      MVA(VTI) 2.6 cm2      MV dec slope 928.0 cm/sec2      MR max rickey 526.9 cm/sec      MR max .5 mmHg      TR max rickey 288.3 cm/sec      TR  max PG 33.3 mmHg      PA V2 max 111.3 cm/sec      Ao root diam 3.4 cm      ACS 2.15 cm      RVSP(TR) 36.3 mmHg      RAP systole 3.0 mmHg     Narrative:        Left ventricular systolic function is mildly decreased. Left   ventricular ejection fraction appears to be 46 - 50%.    Left ventricular diastolic function is consistent with (grade II w/high   LAP) pseudonormalization.    The left atrial cavity is mildly dilated.    Moderate tricuspid valve regurgitation is present.    Estimated right ventricular systolic pressure from tricuspid   regurgitation is mildly elevated (35-45 mmHg).      SCANNED EKG [[id number]] Resulted: 09/21/23     Updated: 09/22/23 1327    SCANNED EKG [[id number]] Resulted: 09/21/23     Updated: 09/22/23 1328    SCANNED EKG [[id number]] Resulted: 09/21/23     Updated: 09/22/23 1330    SCANNED EKG [[id number]] Resulted: 09/21/23     Updated: 09/22/23 2006          ---------------------------------------------------  TTE/VINNY:  Results for orders placed during the hospital encounter of 09/21/23    Adult Transthoracic Echo Complete w/ Color, Spectral and Contrast if necessary per protocol    Interpretation Summary    Left ventricular systolic function is mildly decreased. Left ventricular ejection fraction appears to be 46 - 50%.    Left ventricular diastolic function is consistent with (grade II w/high LAP) pseudonormalization.    The left atrial cavity is mildly dilated.    Moderate tricuspid valve regurgitation is present.    Estimated right ventricular systolic pressure from tricuspid regurgitation is mildly elevated (35-45 mmHg).        Lab Results (last 24 hours)       Procedure Component Value Units Date/Time    aPTT [[id number]]  (Abnormal) Collected: 09/23/23 1303    Specimen: Blood Updated: 09/23/23 1707     PTT 50.5 seconds     Narrative:      The recommended Heparin therapeutic range is 68-97 seconds.    Basic Metabolic Panel [[id number]]  (Abnormal) Collected: 09/24/23 0451    Specimen:  Blood Updated: 09/24/23 0526     Glucose 134 mg/dL      BUN 14 mg/dL      Creatinine 0.70 mg/dL      Sodium 137 mmol/L      Potassium 3.4 mmol/L      Chloride 105 mmol/L      CO2 22.0 mmol/L      Calcium 8.1 mg/dL      BUN/Creatinine Ratio 20.0     Anion Gap 10.0 mmol/L      eGFR 86.5 mL/min/1.73     Narrative:      GFR Normal >60  Chronic Kidney Disease <60  Kidney Failure <15    The GFR formula is only valid for adults with stable renal function between ages 18 and 70.              A/P    Paroxysmal atrial fibrillation  Rhabdomyolysis  JASMEET    Rapid ventricular rates in atrial fibrillation.  Have slowly uptitrated metoprolol tolerating 100 every 12 off of diltiazem drip and has since converted to sinus rhythm today.  Echocardiogram showing abnormal septal wall motion and reduced EF which may be due to tachycardia induced cardiomyopathy versus obstructive coronary artery disease.  Can consider ischemic investigation as an outpatient.  Given her history of stroke her [id number] score is elevated at 6.  We discussed benefits and risks of anticoagulation and Eliquis 5 twice daily started          Part of this note may be an electronic transcription/translation of spoken language to printed text using the Dragon Dictation System.

## 2023-09-24 NOTE — PLAN OF CARE
Goal Outcome Evaluation:  Plan of Care Reviewed With: patient           Outcome Evaluation: PT Eval and treatment occurred in 3 sessions today, paused but request for films of knees to rule out fractures, which there were none identified. THe second pause was to accomodate family from out of town visiting, and the 3rd visit was to assess her OOB and wt bearing stacy on negra LEs as OT eval mentioned hesitancey on pt part to wt bearing RLE.  In the final outcome, she moves negra LEs well and moved s/ min assist of 1 to EOB and sit to stand w/ CGA 2. She did not walk because she was upset she didn't have her shoes, then became more agitated and confused. Son entered room and assisted her to calm and reorient tho she would revisit confusion on place periodically post return supine. MRI results found at end of session indicating multi infarcts, acute.  Pt had been noted to have abnormalt tone L hand that appears as stroke with decreased wrist control, particularly w/ poor grasp of walker handle despite assist. She took wt negra LEs and started gait fwd but we had to limit that due to lines.  She did not show signes of pain in wt bearing or sup<>sit.  Pt will need etensive rehab to home to attempt to reach PLOF; Pt/family goals below after discussion  with son. His long term goals include  her abilty to transfer self to/from bed to [facility] at some point.  They plan she will d/c home w/ him. Rec IRF w/ extensive cognitive ;program as well as motor control      Anticipated Discharge Disposition (PT): inpatient rehabilitation facility, other (see comments) (rehab to home pending progress)

## 2023-09-24 NOTE — THERAPY EVALUATION
Patient Name: Eloisa Chang  : 1940    MRN: [id number]                              Today's Date: 2023     PT Evaluation  Admit Date: 2023    Visit Dx:     ICD-10-CM ICD-9-CM   1. Traumatic rhabdomyolysis, initial encounter  T79.6XXA 958.6   2. Compression fracture of T12 vertebra, initial encounter  S22.080A 805.2   3. Impaired mobility and ADLs [Z74.09, Z78.9 (ICD-10-CM)]  Z74.09 V49.89    Z78.9    4. Impaired functional mobility, balance, gait, and endurance [Z74.09 (ICD-10-CM)]  Z74.09 V49.89     Patient Active Problem List   Diagnosis    Rhabdomyolysis     Past Medical History:   Diagnosis Date    Diabetes mellitus     Hypertension     Stroke      Past Surgical History:   Procedure Laterality Date    HYSTERECTOMY        General Information       Row Name 23 1010          Physical Therapy Time and Intention    Mode of Treatment physical therapy  -GB       Row Name 23 172       General Information    Patient Profile Reviewed yes  -GB yes  -GB    Prior Level of Function -- independent:;all household mobility;driving  -GB    Existing Precautions/Restrictions fall;other (see comments);spinal;brace worn when out of bed  -GB fall;other (see comments);spinal;brace worn when out of bed  -GB      Row Name 23 1010          General Information    Patient Profile Reviewed yes  -GB     Prior Level of Function independent:;all household mobility;driving;community mobility  -GB       Row Name 23 1727 23 1010       Living Environment    People in Home alone  -GB alone  -GB      Row Name 23 1753 09/23/23 1010       Home Main Entrance    Number of Stairs, Main Entrance two  1 + 1 so can use walker  -GB one;two  -GB      Row Name 23 1753 09/23/23 1010       Stairs Within Home, Primary    Stairs, Within Home, Primary -- sttes if she lives w/ son there are 2 small steps but not sure re; handrails;  -GB    Number of Stairs, Within Home, Primary none  -GB --     Stairs Comment, Within Home, Primary -- pt states son is planning on living w/ her telling her she is not going to [place]iva alone any more;  -GB      Row Name 09/23/23 1753 09/23/23 1010       Cognition    Orientation Status (Cognition) disoriented to;place  once upright staninbd she became upset she didnt have her shoes; felt she was in service station; son able to re-orieint her  -GB oriented to;person;situation;time;verbal cues/prompts needed for orientation;place  -GB      Row Name 09/23/23 1753 09/23/23 1010       Safety Issues, Functional Mobility    Impairments Affecting Function (Mobility) -- pain;grasp;balance;strength;range of motion (ROM);endurance/activity tolerance  -GB    Comment, Safety Issues/Impairments (Mobility) pt became agitated once sitting EOB bcs she did not have her shoes and thought she was at service station needing to get to hospital; Son was able to re-orient her and calm her after some discussion  -GB --              User Key  (r) = Recorded By, (t) = Taken By, (c) = Cosigned By      Initials Name Provider Type    Tracey Kauffman, PT Physical Therapist                   Mobility       Row Name 09/23/23 1727          Bed Mobility    Supine-Sit [place] (Bed Mobility) minimum assist (75% patient effort);1 person assist  -GB     Sit-Supine [place] (Bed Mobility) minimum assist (75% patient effort);1 person assist  -GB     Assistive Device (Bed Mobility) head of bed elevated  -GB       Row Name 09/23/23 1727          Sit-Stand Transfer    Sit-Stand [place] (Transfers) minimum assist (75% patient effort);2 person assist  -GB     Assistive Device (Sit-Stand Transfers) walker, front-wheeled  -GB       Row Name 09/23/23 1727          Gait/Stairs (Locomotion)    [place] Level (Gait) minimum assist (75% patient effort);contact guard;1 person assist;1 person to manage equipment  -GB     Distance in Feet (Gait) pt took couple steps fwd but not under direction but out of  frustration/confusion; she was redirected back toward bed but took wt on negra LEs w/out c/o or noted difficulty  -               User Key  (r) = Recorded By, (t) = Taken By, (c) = Cosigned By      Initials Name Provider Type    Tracey Kauffman, PT Physical Therapist                   Obj/Interventions       Row Name 09/23/23 1727 09/23/23 1400       Range of Motion Comprehensive    Comment, General Range of Motion pt wearing lumbo sacral corset when PT entered each time; it was placed elevated to cover ribs as well as lower back; no pain noted in her movement once moving sup to sit.  - pt films came back w/out fx noted; We did AAROM for hip and knee flx/ext negra which was better but still needs a lot of assist and relaxation before she will gait range; Able to get 90 dg hip flx and ~80 deg knee flx R, and about 90 90 deg flx hip an d knee; Very tender at patlella areas negra R>L. ice placed negra knees to comfort;  -      Row Name 09/23/23 1024          Range of Motion Comprehensive    Comment, General Range of Motion painful AAROM negra LES at knees due to bruising/landing on knees w/ fall per report, ankle flx/ext active and stacy 4-4-/5 resistance; knees barely initiate quad sets negra due to pain;  -       Row Name 09/23/23 1727 09/23/23 1400       Strength Comprehensive (MMT)    Comment, General Manual Muscle Testing (MMT) Assessment MRI read as infarcts but due to soreness at negra LE and confusion this pn it is difficult to determine effects of infarcts on her LE motor function this date  -JANNIE at least 3-3-/5 knee ext and 3/5 hip ankd knee flx.  -      Row Name 09/23/23 1024          Strength Comprehensive (MMT)    Comment, General Manual Muscle Testing (MMT) Assessment DF/PF 4./5 negra; knees barely stacy moving ; hip flx AAROM only partial range which she limited due to knee pain;  -       Row Name 09/23/23 1727          Balance    Balance Assessment sitting static balance;sitting dynamic  balance;standing static balance  -GB     Static Sitting Balance standby assist  -GB     Dynamic Sitting Balance standby assist  -GB     Position, Sitting Balance unsupported;sitting edge of bed  -GB     Static Standing Balance contact guard  -GB       Row Name 09/23/23 1727 09/23/23 1024       Sensory Assessment (Somatosensory)    Sensory Assessment (Somatosensory) LE sensation intact  -GB LE sensation intact  -GB              User Key  (r) = Recorded By, (t) = Taken By, (c) = Cosigned By      Initials Name Provider Type    GB Tracey Gallegos, PT Physical Therapist                   Goals/Plan       Row Name 09/23/23 1425          Bed Mobility Goal 1 (PT)    Activity/Assistive Device (Bed Mobility Goal 1, PT) bed mobility activities, all  -GB     [place] Level/Cues Needed (Bed Mobility Goal 1, PT) modified independence  -GB     Time Frame (Bed Mobility Goal 1, PT) 2 weeks  -GB     Progress/Outcomes (Bed Mobility Goal 1, PT) goal not met  -       Row Name 09/23/23 1425          Transfer Goal 1 (PT)    Activity/Assistive Device (Transfer Goal 1, PT) sit-to-stand/stand-to-sit;bed-to-chair/chair-to-bed  -GB     [place] Level/Cues Needed (Transfer Goal 1, PT) modified independence  -GB     Time Frame (Transfer Goal 1, PT) 3 weeks  -GB     Progress/Outcome (Transfer Goal 1, PT) goal not met  -       Row Name 09/23/23 1425          Gait Training Goal 1 (PT)    Activity/Assistive Device (Gait Training Goal 1, PT) decrease fall risk  -GB     [place] Level (Gait Training Goal 1, PT) modified independence;standby assist  -GB     Distance (Gait Training Goal 1, PT) 100 ft per trip w/out LOB  -GB     Time Frame (Gait Training Goal 1, PT) 30 days  -GB       Row Name 09/23/23 1425          Stairs Goal 1 (PT)    Activity/Assistive Device (Stairs Goal 1, PT) ascending stairs;descending stairs  -GB     [place] Level/Cues Needed (Stairs Goal 1, PT) modified independence  -GB     Number of Stairs  (Stairs Goal 1, PT) up 2 steps w/ FWRW and SBA  -GB     Time Frame (Stairs Goal 1, PT) 30 days  -GB       Row Name 09/23/23 1425          Therapy Assessment/Plan (PT)    Planned Therapy Interventions (PT) balance training;bed mobility training;gait training;neuromuscular re-education;patient/family education;home exercise program;transfer training;orthotic fitting/training;wheelchair management/propulsion training;ROM (range of motion);stair training;strengthening;motor coordination training  -               User Key  (r) = Recorded By, (t) = Taken By, (c) = Cosigned By      Initials Name Provider Type    GB Tracey Gallegos, PT Physical Therapist                   Clinical Impression       Row Name 09/23/23 1017          Pain    Pretreatment Pain Rating 0/10 - no pain  -     Pain Intervention(s) Repositioned  -       Row Name 09/23/23 0311 09/23/23 1017       Plan of Care Review    Plan of Care Reviewed With -- patient  -GB    Outcome Evaluation PT Eval and treatment occurred in 3 sessions today, paused but request for films of knees to rule out fractures, which there were none identified. THe second pause was to accomodate family from out of town visiting, and the 3rd visit was to assess her OOB and wt bearing stacy on negra LEs as OT eval mentioned hesitancey on pt part to wt bearing RLE.  In the final outcome, she moves negra LEs well and moved s/ min assist of 1 to EOB and sit to stand w/ CGA 2. She did not walk because she was upset she didn't have her shoes, then became more agitated and confused. Son entered room and assisted her to calm and reorient tho she would revisit confusion on place periodically post return supine. MRI results found at end of session indicating multi infarcts, acute.  Pt had been noted to have abnormalt tone L hand that appears as stroke with decreased wrist control, particularly w/ poor grasp of walker handle despite assist. She took wt negra LEs and started gait fwd but we  had to limit that due to lines.  She did not show signes of pain in wt bearing or sup<>sit.  Pt will need etensive rehab to home to attempt to reach PLOF; Pt/family goals below after discussion  with son. His long term goals include  her abilty to transfer self to/from bed to [facility] at some point.  They plan she will d/c home w/ him. Rec IRF w/ extensive cognitive ;program as well as motor control  -GB Eval paused due to pt c/o of pain, tenderness at negra knees and hesitancy in moving them , both w/ bruising from her fall at home per report.  Films ordered for knees by Dr Franco;  -GB      Row Name 09/23/23 1750 09/23/23 1017       Therapy Assessment/Plan (PT)    Patient/Family Therapy Goals Statement (PT) please see pt goals post revision after talking to son on last visit for eval/follow up  -GB --    Rehab Potential (PT) fair, will monitor progress closely  -GB good, to achieve stated therapy goals  -GB    Criteria for Skilled Interventions Met (PT) yes  -GB yes  -GB    Therapy Frequency (PT) other (see comments)  4-6 d/w  -GB other (see comments)  4-6 d/w  -GB      Row Name 09/23/23 1750 09/23/23 1400       Vital Signs    Pre Systolic BP Rehab 165  -  -GB    Pre Treatment Diastolic BP 98  -GB 59  -GB    Post Systolic BP Rehab 123  -GB --    Post Treatment Diastolic BP 64  -GB --    Pretreatment Heart Rate (beats/min) 80  -GB 66  -GB    Posttreatment Heart Rate (beats/min) 109  irreg  ;  -GB --    Pre SpO2 (%) 94  -GB --    O2 Delivery Pre Treatment room air  -GB room air  -GB    Post SpO2 (%) 95  -GB --    O2 Delivery Post Treatment room air  -GB --    Pre Patient Position Supine  -GB Supine  -GB    Intra Patient Position Standing  -GB --    Post Patient Position Supine  -GB --      Row Name 09/23/23 1017          Vital Signs    Pre Systolic BP Rehab 140  -GB     Pre Treatment Diastolic BP 61  -GB     Pretreatment Heart Rate (beats/min) 92  -GB     Pre SpO2 (%) 95  -GB     O2 Delivery Pre Treatment room  air  -GB     Pre Patient Position Supine  -GB     Post Patient Position Supine  -GB       Row Name 09/23/23 1750 09/23/23 1400       Positioning and Restraints    Pre-Treatment Position in bed  -GB in bed  -GB    Post Treatment Position bed  -GB --    In Bed supine;patient within staff view;with family/caregiver;call light within reach;encouraged to call for assist;side rails up x3;side rails up x1  -GB --      Row Name 09/23/23 1017          Positioning and Restraints    Pre-Treatment Position in bed  -GB     Post Treatment Position bed  -GB     In Bed supine;patient within staff view;call light within reach;encouraged to call for assist  -GB               User Key  (r) = Recorded By, (t) = Taken By, (c) = Cosigned By      Initials Name Provider Type    Tracey Kauffman, PT Physical Therapist                   Outcome Measures       Row Name 09/23/23 1909          How much help from another person do you currently need...    Turning from your back to your side while in flat bed without using bedrails? 3  -GB     Moving from lying on back to sitting on the side of a flat bed without bedrails? 3  -GB     Moving to and from a bed to a chair (including a wheelchair)? 3  -GB     Standing up from a chair using your arms (e.g., wheelchair, bedside chair)? 3  -GB     Climbing 3-5 steps with a railing? 2  -GB     To walk in hospital room? 2  -GB     AM-PAC 6 Clicks Score (PT) 16  -GB     Highest level of mobility 5 --> Static standing  -GB       Row Name 09/23/23 1909          Modified [place] Scale    Pre-Stroke Modified [place] Scale 0 - No Symptoms at all.  -GB     Modified [place] Scale 4 - Moderately severe disability.  Unable to walk without assistance, and unable to attend to own bodily needs without assistance.  -GB       Row Name 09/23/23 1909          Functional Assessment    Outcome Measure Options Modified [place];AM-PAC 6 Clicks Basic Mobility (PT)  -GB               User Key  (r) = Recorded By, (t) =  Taken By, (c) = Cosigned By      Initials Name Provider Type    Tracey Kauffman, PT Physical Therapist                                 Physical Therapy Education       Title: PT OT SLP Therapies (In Progress)       Topic: Physical Therapy (Not Started)       Point: Mobility training (Not Started)       Learner Progress:  Not documented in this visit.              Point: Home exercise program (Not Started)       Learner Progress:  Not documented in this visit.              Point: Body mechanics (Not Started)       Learner Progress:  Not documented in this visit.              Point: Precautions (Not Started)       Learner Progress:  Not documented in this visit.                                  PT Recommendation and Plan  Planned Therapy Interventions (PT): balance training, bed mobility training, gait training, neuromuscular re-education, patient/family education, home exercise program, transfer training, orthotic fitting/training, wheelchair management/propulsion training, ROM (range of motion), stair training, strengthening, motor coordination training  Plan of Care Reviewed With: patient  Outcome Evaluation: PT Eval and treatment occurred in 3 sessions today, paused but request for films of knees to rule out fractures, which there were none identified. THe second pause was to accomodate family from out of town visiting, and the 3rd visit was to assess her OOB and wt bearing stacy on negra LEs as OT eval mentioned hesitancey on pt part to wt bearing RLE.  In the final outcome, she moves negra LEs well and moved s/ min assist of 1 to EOB and sit to stand w/ CGA 2. She did not walk because she was upset she didn't have her shoes, then became more agitated and confused. Son entered room and assisted her to calm and reorient tho she would revisit confusion on place periodically post return supine. MRI results found at end of session indicating multi infarcts, acute.  Pt had been noted to have abnormalt tone L  hand that appears as stroke with decreased wrist control, particularly w/ poor grasp of walker handle despite assist. She took wt negra LEs and started gait fwd but we had to limit that due to lines.  She did not show signes of pain in wt bearing or sup<>sit.  Pt will need etensive rehab to home to attempt to reach PLOF; Pt/family goals below after discussion  with son. His long term goals include  her abilty to transfer self to/from bed to [facility] at some point.  They plan she will d/c home w/ him. Rec IRF w/ extensive cognitive ;program as well as motor control     Time Calculation:   PT Evaluation Complexity  History, PT Evaluation Complexity: 3 or more personal factors and/or comorbidities  Examination of Body Systems (PT Eval Complexity): total of 4 or more elements  Clinical Presentation (PT Evaluation Complexity): evolving  Clinical Decision Making (PT Evaluation Complexity): high complexity  Overall Complexity (PT Evaluation Complexity): moderate complexity     PT Charges       Row Name 09/23/23 1727 09/23/23 1400 09/23/23 1010       Time Calculation    Start Time 1727  -GB 1400  -GB 1010  -GB    Stop Time 1758  -GB 1420  -GB 1034  -GB    Time Calculation (min) 31 min  -GB 20 min  -GB 24 min  -GB    PT Received On 09/23/23  -GB -- --    PT Goal Re-Cert Due Date 10/06/23  -GB -- --       Untimed Charges    PT Eval/Re-eval Minutes 75  24+20+31  -GB -- --       Total Minutes    Untimed Charges Total Minutes 75  -GB -- --     Total Minutes 75  -GB -- --              User Key  (r) = Recorded By, (t) = Taken By, (c) = Cosigned By      Initials Name Provider Type    GB Tracey Gallegos, PT Physical Therapist                  Therapy Charges for Today       Code Description Service Date Service Provider Modifiers Qty    [id number] HC-PT EVAL MOD COMPLEXITY 5 9/23/2023 Tracey Gallegos, PT  1            PT G-Codes  Outcome Measure Options: Modified [place], AM-PAC 6 Clicks Basic Mobility (PT)  AM-PAC 6  Clicks Score (PT): 16  AM-PAC 6 Clicks Score (OT): 12  Modified [place] Scale: 4 - Moderately severe disability.  Unable to walk without assistance, and unable to attend to own bodily needs without assistance.  PT Discharge Summary  Anticipated Discharge Disposition (PT): inpatient rehabilitation facility, other (see comments) (rehab to home pending progress)    Tracey Gallegos, PT  9/23/2023

## 2023-09-25 ENCOUNTER — HOME HEALTH ADMISSION (OUTPATIENT)
Dept: HOME HEALTH SERVICES | Facility: HOME HEALTHCARE | Age: 83
End: 2023-09-25
Payer: MEDICARE

## 2023-09-25 ENCOUNTER — TRANSCRIBE ORDERS (OUTPATIENT)
Dept: HOME HEALTH SERVICES | Facility: HOME HEALTHCARE | Age: 83
End: 2023-09-25
Payer: MEDICARE

## 2023-09-25 ENCOUNTER — APPOINTMENT (OUTPATIENT)
Dept: GENERAL RADIOLOGY | Facility: HOSPITAL | Age: 83
DRG: 558 | End: 2023-09-25
Payer: MEDICARE

## 2023-09-25 DIAGNOSIS — M62.82 NON-TRAUMATIC RHABDOMYOLYSIS: Primary | ICD-10-CM

## 2023-09-25 LAB
ANION GAP SERPL CALCULATED.3IONS-SCNC: 8 MMOL/L (ref 5–15)
BUN SERPL-MCNC: 15 MG/DL (ref 8–23)
BUN/CREAT SERPL: 20.3 (ref 7–25)
CALCIUM SPEC-SCNC: 8 MG/DL (ref 8.6–10.5)
CHLORIDE SERPL-SCNC: 106 MMOL/L (ref 98–107)
CK SERPL-CCNC: 128 U/L (ref 20–180)
CO2 SERPL-SCNC: 20 MMOL/L (ref 22–29)
CREAT SERPL-MCNC: 0.74 MG/DL (ref 0.57–1)
EGFRCR SERPLBLD CKD-EPI 2021: 80.9 ML/MIN/1.73
GLUCOSE SERPL-MCNC: 104 MG/DL (ref 65–99)
POTASSIUM SERPL-SCNC: 4 MMOL/L (ref 3.5–5.2)
SODIUM SERPL-SCNC: 134 MMOL/L (ref 136–145)
WHOLE BLOOD HOLD SPECIMEN: NORMAL

## 2023-09-25 PROCEDURE — 25010000002 CEFTRIAXONE PER 250 MG

## 2023-09-25 PROCEDURE — 97530 THERAPEUTIC ACTIVITIES: CPT

## 2023-09-25 PROCEDURE — 80048 BASIC METABOLIC PNL TOTAL CA: CPT

## 2023-09-25 PROCEDURE — 73600 X-RAY EXAM OF ANKLE: CPT

## 2023-09-25 PROCEDURE — 82550 ASSAY OF CK (CPK): CPT

## 2023-09-25 RX ORDER — AMLODIPINE BESYLATE 10 MG/1
10 TABLET ORAL
Qty: 90 TABLET | Refills: 3 | Status: SHIPPED | OUTPATIENT
Start: 2023-09-25

## 2023-09-25 RX ORDER — FAMOTIDINE 40 MG/1
40 TABLET, FILM COATED ORAL DAILY
Qty: 30 TABLET | Refills: 3 | Status: SHIPPED | OUTPATIENT
Start: 2023-09-25

## 2023-09-25 RX ORDER — AMOXICILLIN AND CLAVULANATE POTASSIUM 875; 125 MG/1; MG/1
1 TABLET, FILM COATED ORAL 2 TIMES DAILY
Qty: 10 TABLET | Refills: 0 | Status: SHIPPED | OUTPATIENT
Start: 2023-09-25

## 2023-09-25 RX ORDER — METOPROLOL TARTRATE 100 MG/1
100 TABLET ORAL EVERY 12 HOURS SCHEDULED
Qty: 180 TABLET | Refills: 3 | Status: SHIPPED | OUTPATIENT
Start: 2023-09-25

## 2023-09-25 RX ADMIN — APIXABAN 5 MG: 5 TABLET, FILM COATED ORAL at 20:18

## 2023-09-25 RX ADMIN — Medication 10 ML: at 09:27

## 2023-09-25 RX ADMIN — Medication 10 ML: at 20:18

## 2023-09-25 RX ADMIN — AMLODIPINE BESYLATE 10 MG: 10 TABLET ORAL at 09:26

## 2023-09-25 RX ADMIN — METOPROLOL TARTRATE 100 MG: 100 TABLET, FILM COATED ORAL at 09:26

## 2023-09-25 RX ADMIN — CEFTRIAXONE SODIUM 1000 MG: 1 INJECTION, POWDER, FOR SOLUTION INTRAMUSCULAR; INTRAVENOUS at 13:36

## 2023-09-25 RX ADMIN — FAMOTIDINE 40 MG: 40 TABLET, FILM COATED ORAL at 09:26

## 2023-09-25 RX ADMIN — METOPROLOL TARTRATE 100 MG: 100 TABLET, FILM COATED ORAL at 20:18

## 2023-09-25 RX ADMIN — APIXABAN 5 MG: 5 TABLET, FILM COATED ORAL at 09:26

## 2023-09-25 NOTE — THERAPY PROGRESS REPORT/RE-CERT
Patient Name: Eloisa Chang  : 1940    MRN: [id number]                              Today's Date: 2023       Admit Date: 2023    Visit Dx:     ICD-10-CM ICD-9-CM   1. Traumatic rhabdomyolysis, initial encounter  T79.6XXA 958.6   2. Compression fracture of T12 vertebra, initial encounter  S22.080A 805.2   3. Impaired mobility and ADLs [Z74.09, Z78.9 (ICD-10-CM)]  Z74.09 V49.89    Z78.9    4. Impaired functional mobility, balance, gait, and endurance [Z74.09 (ICD-10-CM)]  Z74.09 V49.89     Patient Active Problem List   Diagnosis    Rhabdomyolysis     Past Medical History:   Diagnosis Date    Diabetes mellitus     Hypertension     Stroke      Past Surgical History:   Procedure Laterality Date    HYSTERECTOMY        General Information       Row Name 23 110          Physical Therapy Time and Intention    Document Type progress note/recertification  -CZ (r) RE (t) CZ (c)     Mode of Treatment physical therapy;occupational therapy  -CZ (r) RE (t) CZ (c)       Row Name 23 110          General Information    Patient Profile Reviewed yes  -CZ (r) RE (t) CZ (c)     Prior Level of Function independent:;all household mobility  -CZ (r) RE (t) CZ (c)     Existing Precautions/Restrictions fall;brace worn when out of bed;spinal  -CZ (r) RE (t) CZ (c)     Barriers to Rehab medically complex  -CZ (r) RE (t) CZ (c)       Row Name 23 110          Living Environment    People in Home other relative(s)  -CZ (r) RE (t) CZ (c)       Row Name 23 110          Home Main Entrance    Number of Stairs, Main Entrance three  -CZ (r) RE (t) CZ (c)     Stair Railings, Main Entrance none  -CZ (r) RE (t) CZ (c)       Row Name 23 110          Stairs Within Home, Primary    Stairs, Within Home, Primary Daughter in law in the room and states she will be with patient  upon d/c. Communicated importance of a hand rail for steps and daughter in law said she will work on getting one put in.  -CZ (r) RE  (t) CZ (c)     Number of Stairs, Within Home, Primary none  -CZ (r) RE (t) CZ (c)       Row Name 09/25/23 1107          Cognition    Orientation Status (Cognition) oriented x 4  -CZ (r) RE (t) CZ (c)       Row Name 09/25/23 1107          Safety Issues, Functional Mobility    Impairments Affecting Function (Mobility) balance;coordination;endurance/activity tolerance;strength;pain  -CZ (r) RE (t) CZ (c)               User Key  (r) = Recorded By, (t) = Taken By, (c) = Cosigned By      Initials Name Provider Type    CZ Georgi Laguna, PT Physical Therapist    Bud Starr PT Student PT Student                   Mobility       Row Name 09/25/23 1107          Bed Mobility    Bed Mobility supine-sit;sit-supine  -CZ (r) RE (t) CZ (c)     Supine-Sit [place] (Bed Mobility) contact guard  -CZ (r) RE (t) CZ (c)     Sit-Supine [place] (Bed Mobility) minimum assist (75% patient effort)  -CZ (r) RE (t) CZ (c)     Assistive Device (Bed Mobility) --  -CZ (r) RE (t) CZ (c)       Row Name 09/25/23 1107          Sit-Stand Transfer    Sit-Stand [place] (Transfers) minimum assist (75% patient effort)  -CZ (r) RE (t) CZ (c)     Assistive Device (Sit-Stand Transfers) walker, front-wheeled  -CZ (r) RE (t) CZ (c)       Row Name 09/25/23 1107          Gait/Stairs (Locomotion)    [place] Level (Gait) minimum assist (75% patient effort);2 person assist  -CZ (r) RE (t) CZ (c)     Assistive Device (Gait) walker, front-wheeled  -CZ (r) RE (t) CZ (c)     Distance in Feet (Gait) 10'x2  -CZ (r) RE (t) CZ (c)     Comment, (Gait/Stairs) Patient ambulated to and from bathroom with decreased jamil and step length. Patient required frequent manual cues for for appropriate L hand placement on FWW.  -CZ (r) RE (t) CZ (c)               User Key  (r) = Recorded By, (t) = Taken By, (c) = Cosigned By      Initials Name Provider Type    CZ Georgi Laguna, PT Physical Therapist    Bud Starr, PT Student PT Student                    Obj/Interventions    No documentation.                  Goals/Plan       Row Name 09/25/23 1107          Bed Mobility Goal 1 (PT)    Activity/Assistive Device (Bed Mobility Goal 1, PT) bed mobility activities, all  -CZ (r) RE (t) CZ (c)     [place] Level/Cues Needed (Bed Mobility Goal 1, PT) modified independence  -CZ (r) RE (t) CZ (c)     Time Frame (Bed Mobility Goal 1, PT) by discharge  -CZ (r) RE (t) CZ (c)     Progress/Outcomes (Bed Mobility Goal 1, PT) goal not met  -CZ (r) RE (t) CZ (c)       Row Name 09/25/23 1107          Transfer Goal 1 (PT)    Activity/Assistive Device (Transfer Goal 1, PT) sit-to-stand/stand-to-sit;bed-to-chair/chair-to-bed;walker, rolling  -CZ (r) RE (t) CZ (c)     [place] Level/Cues Needed (Transfer Goal 1, PT) modified independence  -CZ (r) RE (t) CZ (c)     Time Frame (Transfer Goal 1, PT) by discharge  -CZ (r) RE (t) CZ (c)     Strategies/Barriers (Transfers Goal 1, PT) TLSO.  -CZ     Progress/Outcome (Transfer Goal 1, PT) goal not met;goal revised this date  -CZ       Row Name 09/25/23 1107          Gait Training Goal 1 (PT)    Activity/Assistive Device (Gait Training Goal 1, PT) walker, rolling  -CZ     [place] Level (Gait Training Goal 1, PT) modified independence  -CZ (r) RE (t) CZ (c)     Distance (Gait Training Goal 1, PT) 80' x 1.  -CZ     Time Frame (Gait Training Goal 1, PT) by discharge  -CZ (r) RE (t) CZ (c)     Strategies/Barriers (Gait Training Goal 1, PT) TLSO.  -CZ     Progress/Outcome (Gait Training Goal 1, PT) goal not met;goal revised this date  -CZ       Row Name 09/25/23 1107          Stairs Goal 1 (PT)    Activity/Assistive Device (Stairs Goal 1, PT) using handrail, right  -CZ     [place] Level/Cues Needed (Stairs Goal 1, PT) modified independence  -CZ (r) RE (t) CZ (c)     Number of Stairs (Stairs Goal 1, PT) 3  -CZ (r) RE (t) CZ (c)     Time Frame (Stairs Goal 1, PT) by discharge  -CZ (r) RE (t) CZ (c)     Strategies/Barriers (Stairs  Goal 1, PT) Patient' Daughter-in-law states she will work on getting hand rail installed.  -CZ (r) RE (t) CZ (c)     Progress/Outcome (Stairs Goal 1, PT) goal not met;goal revised this date  -CZ       Row Name 09/25/23 1107          Therapy Assessment/Plan (PT)    Planned Therapy Interventions (PT) balance training;bed mobility training;gait training;neuromuscular re-education;transfer training;stretching;strengthening;stair training;ROM (range of motion);orthotic fitting/training;patient/family education;postural re-education  -CZ (r) RE (t) CZ (c)               User Key  (r) = Recorded By, (t) = Taken By, (c) = Cosigned By      Initials Name Provider Type    CZ Georgi Laguna, PT Physical Therapist    Bud Starr PT Student PT Student                   Clinical Impression       Row Name 09/25/23 1107          Pain    Pretreatment Pain Rating 0/10 - no pain  -CZ (r) RE (t) CZ (c)     Posttreatment Pain Rating 0/10 - no pain  -CZ     Pain Location generalized  -CZ (r) RE (t) CZ (c)     Pain Intervention(s) Ambulation/increased activity;Repositioned;Distraction  -CZ (r) RE (t) CZ (c)       Row Name 09/25/23 1107          Plan of Care Review    Plan of Care Reviewed With patient;family;grandchild(rojelio)  -CZ (r) RE (t) CZ (c)     Outcome Evaluation PT progress note completed. Patient supine upon arrival and agreeable to therapy. Patients daughter-in-law in the room and reports there are 3 steps into home with no handrails. Daughter-in-law states she will be staying with patient 24/7. Patient performed supine to sit with CGA using log rolling technique. Patient performed sit to stand transfer with a FWW and min A. Patient ambulated 10'x2 to and from bathroom with min A x 2 and a FWW. Patient ambulates with decreased jamil and step length with decreased stance time on R LE d/t pain in foot. R foot painful with palpation at midtarsal bones. Nursing notified of pain. Repetitive manual cues required for appropriate  L hand placement on FWW d/t deficits in tone from acute CVA per MRI. Patient performed sit to supine with min A for LE management. Patient is currently unsafe to return home. Barriers to discharge include unsteadiness with gait, difficulty with hand placement on FWW, 3 steps into entrance of home with no hand rail, 80 feet walk from front door to bedroom per daughter-in-law. Patient is currently unable to safely ascend/descend stairs or walk the necessary distance to bed room. Daughter-in-law reports they will work on getting a hand rail placed on steps. Recommend further rehab to improve strength and activity tolerance before d/c home. Continue skilled I/P PT.  -CZ (r) RE (t) CZ (c)       Row Name 09/25/23 1107          Therapy Assessment/Plan (PT)    Rehab Potential (PT) fair, will monitor progress closely  -CZ (r) RE (t) CZ (c)     Criteria for Skilled Interventions Met (PT) yes  -CZ (r) RE (t) CZ (c)     Therapy Frequency (PT) daily  -CZ (r) RE (t) CZ (c)       Row Name 09/25/23 1107          Vital Signs    Pre Systolic BP Rehab 145  -CZ (r) RE (t) CZ (c)     Pre Treatment Diastolic BP 66  -CZ (r) RE (t) CZ (c)     Pretreatment Heart Rate (beats/min) 61  -CZ (r) RE (t) CZ (c)     Pre SpO2 (%) 94  -CZ (r) RE (t) CZ (c)     O2 Delivery Pre Treatment room air  -CZ (r) RE (t) CZ (c)     Pre Patient Position Supine  -CZ (r) RE (t) CZ (c)       Row Name 09/25/23 1107          Positioning and Restraints    Pre-Treatment Position in bed  -CZ (r) RE (t) CZ (c)     Post Treatment Position bed  -CZ (r) RE (t) CZ (c)     In Bed supine;call light within reach;encouraged to call for assist;exit alarm on;with family/caregiver;patient within staff view  -CZ (r) RE (t) CZ (c)               User Key  (r) = Recorded By, (t) = Taken By, (c) = Cosigned By      Initials Name Provider Type    CZ Georgi Laguna, PT Physical Therapist    RE [place], [place], PT Student PT Student                   Outcome Measures       Row Name 09/25/23  1107 09/25/23 0925       How much help from another person do you currently need...    Turning from your back to your side while in flat bed without using bedrails? 3  -CZ (r) RE (t) CZ (c) 3  -KD    Moving from lying on back to sitting on the side of a flat bed without bedrails? 3  -CZ (r) RE (t) CZ (c) 3  -KD    Moving to and from a bed to a chair (including a wheelchair)? 3  -CZ (r) RE (t) CZ (c) 3  -KD    Standing up from a chair using your arms (e.g., wheelchair, bedside chair)? 3  -CZ (r) RE (t) CZ (c) 2  -KD    Climbing 3-5 steps with a railing? 2  -CZ (r) RE (t) CZ (c) 2  -KD    To walk in hospital room? 3  -CZ (r) RE (t) CZ (c) 2  -KD    AM-PAC 6 Clicks Score (PT) 17  -CZ (r) RE (t) 15  -KD    Highest level of mobility 5 --> Static standing  -CZ (r) RE (t) 4 --> Transferred to chair/commode  -KD      Row Name 09/25/23 1107          Functional Assessment    Outcome Measure Options AM-PAC 6 Clicks Basic Mobility (PT)  -CZ (r) RE (t) CZ (c)               User Key  (r) = Recorded By, (t) = Taken By, (c) = Cosigned By      Initials Name Provider Type    CZ Georgi Laguna, PT Physical Therapist    Courtney Luong, RN Registered Nurse    Bud Starr, PT Student PT Student                                 Physical Therapy Education       Title: PT OT SLP Therapies (In Progress)       Topic: Physical Therapy (In Progress)       Point: Mobility training (Done)       Learning Progress Summary             Patient Acceptance, E, VU by RE at 9/25/2023 1218    Comment: PT POC, hand placement from transfers, hand placement on AD, AD management, importance of hand rail for steps.   Family Acceptance, E, VU by RE at 9/25/2023 1218    Comment: PT POC, hand placement from transfers, hand placement on AD, AD management, importance of hand rail for steps.                         Point: Home exercise program (Not Started)       Learner Progress:  Not documented in this visit.              Point: Body mechanics (Not  Started)       Learner Progress:  Not documented in this visit.              Point: Precautions (Not Started)       Learner Progress:  Not documented in this visit.                              User Key       Initials Effective Dates Name Provider Type Discipline    RE 08/16/23 -  Bud Chirinos, PT Student PT Student PT                  PT Recommendation and Plan        Attestation signed by Georgi Laguna, PT at 09/25/23 8288     I agree with this student's documentation.               Goal Outcome Evaluation:  Plan of Care Reviewed With: (P) patient, family, grandchild(rojelio)  Outcome Evaluation: (P) PT progress note completed. Patient supine upon arrival and agreeable to therapy. Patients daughter-in-law in the room and reports there are 3 steps into home with no handrails. Daughter-in-law states she will be staying with patient 24/7. Patient performed supine to sit with CGA using log rolling technique. Patient performed sit to stand transfer with a FWW and min A. Patient ambulated 10'x2 to and from bathroom with min A x 2 and a FWW. Patient ambulates with decreased jamil and step length with decreased stance time on R LE d/t pain in foot. R foot painful with palpation at midtarsal bones. Nursing notified of pain. Repetitive manual cues required for appropriate L hand placement on FWW d/t deficits in tone from acute CVA per MRI. Patient performed sit to supine with min A for LE management. Patient is currently unsafe to return home. Barriers to discharge include unsteadiness with gait, difficulty with hand placement on FWW, 3 steps into entrance of home with no hand rail, 80 feet walk from front door to bedroom per daughter-in-law. Patient is currently unable to safely ascend/descend stairs or walk the necessary distance to bed room. Daughter-in-law reports they will work on getting a hand rail placed on steps. Recommend further rehab to improve strength and activity tolerance before d/c home. Continue skilled  I/P PT.        Anticipated Discharge Disposition (PT): (P) other (see comments) (Rehab to home.)         Time Calculation:         PT Charges       Row Name 09/25/23 1306             Time Calculation    Start Time 1106  -CZ      Stop Time 1152  -CZ      Time Calculation (min) 46 min  -CZ      PT Non-Billable Time (min) 30 min  -CZ      PT Received On 09/25/23  -CZ      PT Goal Re-Cert Due Date 10/08/23  -CZ         Time Calculation- PT    Total Timed Code Minutes- PT 16 minute(s)  -CZ         Timed Charges    05747 - PT Therapeutic Activity Minutes 16  -CZ         Total Minutes    Timed Charges Total Minutes 16  -CZ       Total Minutes 16  -CZ                User Key  (r) = Recorded By, (t) = Taken By, (c) = Cosigned By      Initials Name Provider Type    CZ Georgi Laguna, PT Physical Therapist                  Therapy Charges for Today       Code Description Service Date Service Provider Modifiers Qty    [id number] HC PT THERAPEUTIC ACT EA 15 MIN 9/25/2023 Georgi Laguna, PT GP 1            PT G-Codes  Outcome Measure Options: AM-PAC 6 Clicks Basic Mobility (PT)  AM-PAC 6 Clicks Score (PT): 17  AM-PAC 6 Clicks Score (OT): 12  Modified Dickson Scale: 4 - Moderately severe disability.  Unable to walk without assistance, and unable to attend to own bodily needs without assistance.       Georgi Laguna, PT  9/25/2023

## 2023-09-25 NOTE — THERAPY PROGRESS REPORT/RE-CERT
Patient Name: Eloisa Chang  : 1940    MRN: [id number]                              Today's Date: 2023       Admit Date: 2023    Visit Dx:     ICD-10-CM ICD-9-CM   1. Traumatic rhabdomyolysis, initial encounter  T79.6XXA 958.6   2. Compression fracture of T12 vertebra, initial encounter  S22.080A 805.2   3. Impaired mobility and ADLs [Z74.09, Z78.9 (ICD-10-CM)]  Z74.09 V49.89    Z78.9    4. Impaired functional mobility, balance, gait, and endurance [Z74.09 (ICD-10-CM)]  Z74.09 V49.89     Patient Active Problem List   Diagnosis    Rhabdomyolysis     Past Medical History:   Diagnosis Date    Diabetes mellitus     Hypertension     Stroke      Past Surgical History:   Procedure Laterality Date    HYSTERECTOMY        General Information       Row Name 23 1106          OT Time and Intention    Document Type progress note/recertification  -RW     Mode of Treatment co-treatment;physical therapy;occupational therapy  -RW       Row Name 23 1106          General Information    Patient Profile Reviewed yes  -RW     Existing Precautions/Restrictions fall;spinal;brace worn when out of bed  -RW       Row Name 23 1106          Cognition    Orientation Status (Cognition) oriented to;person;place;situation;verbal cues/prompts needed for orientation;time  -RW       Row Name 23 1106          Safety Issues, Functional Mobility    Safety Issues Affecting Function (Mobility) at risk behavior observed;positioning of assistive device;safety precaution awareness  -RW     Impairments Affecting Function (Mobility) pain;grasp;balance;strength;range of motion (ROM);endurance/activity tolerance  -RW               User Key  (r) = Recorded By, (t) = Taken By, (c) = Cosigned By      Initials Name Provider Type    RW Meli Melgar OT Occupational Therapist                     Mobility/ADL's       Row Name 23 110          Bed Mobility    Bed Mobility supine-sit;sit-supine  -RW     Supine-Sit  [place] (Bed Mobility) contact guard  -RW     Sit-Supine [place] (Bed Mobility) minimum assist (75% patient effort)  -RW     Assistive Device (Bed Mobility) leg   -       Row Name 09/25/23 1106          Transfers    Transfers sit-stand transfer;stand-sit transfer;toilet transfer  -       Row Name 09/25/23 1106          Sit-Stand Transfer    Sit-Stand [place] (Transfers) minimum assist (75% patient effort)  -RW     Assistive Device (Sit-Stand Transfers) walker, front-wheeled  -RW       Row Name 09/25/23 1106          Stand-Sit Transfer    Stand-Sit [place] (Transfers) minimum assist (75% patient effort)  -RW     Assistive Device (Stand-Sit Transfers) walker, front-wheeled  -RW       Row Name 09/25/23 1106          Toilet Transfer    Type (Toilet Transfer) sit-stand;stand-sit  -RW     [place] Level (Toilet Transfer) minimum assist (75% patient effort)  -       Row Name 09/25/23 1106          Activities of Daily Living    BADL Assessment/Intervention toileting  -       Row Name 09/25/23 1106          Toileting Assessment/Training    [place] Level (Toileting) adjust/manage clothing;dependent (less than 25% patient effort);perform perineal hygiene;standby assist  -     Assistive Devices (Toileting) commode  -RW               User Key  (r) = Recorded By, (t) = Taken By, (c) = Cosigned By      Initials Name Provider Type    Meli Sim OT Occupational Therapist                   Obj/Interventions    No documentation.                  Goals/Plan       Row Name 09/25/23 1106          Transfer Goal 1 (OT)    Activity/Assistive Device (Transfer Goal 1, OT) transfers, all  -RW     [place] Level/Cues Needed (Transfer Goal 1, OT) contact guard required  -RW     Time Frame (Transfer Goal 1, OT) long term goal (LTG);by discharge  -RW     Progress/Outcome (Transfer Goal 1, OT) goal not met;goal revised this date  -       Row Name 09/25/23 1106          Bathing Goal 1 (OT)     Activity/Device (Bathing Goal 1, OT) lower body bathing  -RW     [place] Level/Cues Needed (Bathing Goal 1, OT) standby assist  -RW     Time Frame (Bathing Goal 1, OT) long term goal (LTG);by discharge  -RW     Strategies/Barriers (Bathing Goal 1, OT) AD as needed  -RW     Progress/Outcomes (Bathing Goal 1, OT) goal not met;goal revised this date  -RW       Row Name 09/25/23 1106          Dressing Goal 1 (OT)    Activity/Device (Dressing Goal 1, OT) lower body dressing  -RW     [place]/Cues Needed (Dressing Goal 1, OT) standby assist  -RW     Time Frame (Dressing Goal 1, OT) long term goal (LTG);by discharge  -RW     Strategies/Barriers (Dressing Goal 1, OT) AD as needed  -RW     Progress/Outcome (Dressing Goal 1, OT) goal not met;goal revised this date  -RW       Row Name 09/25/23 1106          Problem Specific Goal 1 (OT)    Problem Specific Goal 1 (OT) Pt will perform OOB ax for 10 minutes with CGA to increase ax tolerance for ADLs.  -RW     Time Frame (Problem Specific Goal 1, OT) long term goal (LTG);by discharge  -RW     Progress/Outcome (Problem Specific Goal 1, OT) goal not met;new goal  -RW       Row Name 09/25/23 1106          Therapy Assessment/Plan (OT)    Planned Therapy Interventions (OT) activity tolerance training;manual therapy/joint mobilization;patient/caregiver education/training;adaptive equipment training;neuromuscular control/coordination retraining;BADL retraining;ROM/therapeutic exercise;cognitive/visual perception retraining;occupation/activity based interventions;strengthening exercise;edema control/reduction;functional balance retraining;IADL retraining;passive ROM/stretching;transfer/mobility retraining  -RW               User Key  (r) = Recorded By, (t) = Taken By, (c) = Cosigned By      Initials Name Provider Type    RW Meli Melgar OT Occupational Therapist                   Clinical Impression       Row Name 09/25/23 1106          Pain Assessment    Pretreatment  Pain Rating 0/10 - no pain  -RW     Posttreatment Pain Rating 0/10 - no pain  -RW       Row Name 09/25/23 1106          Plan of Care Review    Plan of Care Reviewed With patient  -RW     Outcome Evaluation OT recert complete, co-tx with PT. Pt supine upon arrival and agreeable to therapy. Pts family present throughout session, educated on gait belt use and transfers, DIL assisted with toilet t/f and gait from bathroom to bed, DIL also reports 3 steps to get into the house, no handrails. Pt perf sup>sit with CGA using log rolling technique. Pt stated R ankle pain while sitting EOB, nsg notified. Pts brace readjusted while sitting EOB. Pt perf sit<>stand with Jordyn and FWW. Pt noted to require frequent verbal and tactile cues for L hand placement during ambulation to bathoom. Pt perf ambulation of 10'x2 with Jordyn and FWW. Pt perf toilet transfer with Jordyn and DIL assist. Pt dependent for clothing management and SBA for perineal hygiene while sitting. Pt perf ambulation back to bed with Jordyn, FWW and DIL assist. Pt perf sit>sup with Jordyn using log rolling technique. Goals updated/revised this date. Cont OT POC. Pt would benefit from rehab to home to increase independence and safety, however family expressed desire have patient d/c to home, patient would have 24/7 assist from family at home and would benefit from home health OT if d/c home with family.  -RW       Row Name 09/25/23 1106          Vital Signs    Pre Systolic BP Rehab 145  -RW     Pre Treatment Diastolic BP 66  -RW     Pretreatment Heart Rate (beats/min) 61  -RW     Pre SpO2 (%) 94  -RW     O2 Delivery Pre Treatment room air  -RW     Pre Patient Position Supine  -RW     Post Patient Position Supine  -RW       Row Name 09/25/23 1106          Positioning and Restraints    Pre-Treatment Position in bed  -RW     Post Treatment Position bed  -RW     In Bed notified nsg;supine;call light within reach;encouraged to call for assist;exit alarm on;with  family/caregiver  -RW               User Key  (r) = Recorded By, (t) = Taken By, (c) = Cosigned By      Initials Name Provider Type    RW Meli Melgar, OT Occupational Therapist                   Outcome Measures       Row Name 09/25/23 1106          How much help from another is currently needed...    Putting on and taking off regular lower body clothing? 2  -RW     Bathing (including washing, rinsing, and drying) 2  -RW     Toileting (which includes using toilet bed pan or urinal) 3  -RW     Putting on and taking off regular upper body clothing 3  -RW     Taking care of personal grooming (such as brushing teeth) 3  -RW     Eating meals 3  -RW     AM-PAC 6 Clicks Score (OT) 16  -RW       Row Name 09/25/23 1107 09/25/23 0925       How much help from another person do you currently need...    Turning from your back to your side while in flat bed without using bedrails? 3  -CZ (r) RE (t) CZ (c) 3  -KD    Moving from lying on back to sitting on the side of a flat bed without bedrails? 3  -CZ (r) RE (t) CZ (c) 3  -KD    Moving to and from a bed to a chair (including a wheelchair)? 3  -CZ (r) RE (t) CZ (c) 3  -KD    Standing up from a chair using your arms (e.g., wheelchair, bedside chair)? 3  -CZ (r) RE (t) CZ (c) 2  -KD    Climbing 3-5 steps with a railing? 2  -CZ (r) RE (t) CZ (c) 2  -KD    To walk in hospital room? 3  -CZ (r) RE (t) CZ (c) 2  -KD    AM-PAC 6 Clicks Score (PT) 17  -CZ (r) RE (t) 15  -KD    Highest level of mobility 5 --> Static standing  -CZ (r) RE (t) 4 --> Transferred to chair/commode  -KD      Row Name 09/25/23 1107 09/25/23 1106       Functional Assessment    Outcome Measure Options AM-PAC 6 Clicks Basic Mobility (PT)  -CZ (r) RE (t) CZ (c) AM-PAC 6 Clicks Daily Activity (OT)  -RW              User Key  (r) = Recorded By, (t) = Taken By, (c) = Cosigned By      Initials Name Provider Type    CZ Georgi Laguna, PT Physical Therapist    RW Meli Melgar, [facility] Occupational Therapist    KD  Courtney De León, RN Registered Nurse    Bud Starr, PT Student PT Student                    Occupational Therapy Education       Title: PT OT SLP Therapies (In Progress)       Topic: Occupational Therapy (In Progress)       Point: ADL training (Done)       Description:   Instruct learner(s) on proper safety adaptation and remediation techniques during self care or transfers.   Instruct in proper use of assistive devices.                  Learning Progress Summary             Patient Acceptance, E,TB, VU by RW at 9/25/2023 1311    Comment: POC, Role of OT, transfer training    Acceptance, E,TB, VU by SJ at 9/22/2023 0951    Comment: POC, role of OT, transfer training   Family Acceptance, E,TB, VU by RW at 9/25/2023 1311    Comment: POC, Role of OT, transfer training                         Point: Home exercise program (Not Started)       Description:   Instruct learner(s) on appropriate technique for monitoring, assisting and/or progressing therapeutic exercises/activities.                  Learner Progress:  Not documented in this visit.              Point: Precautions (Done)       Description:   Instruct learner(s) on prescribed precautions during self-care and functional transfers.                  Learning Progress Summary             Patient Acceptance, E,TB, VU by RW at 9/25/2023 1311    Comment: POC, Role of OT, transfer training   Family Acceptance, E,TB, VU by RW at 9/25/2023 1311    Comment: POC, Role of OT, transfer training                         Point: Body mechanics (Done)       Description:   Instruct learner(s) on proper positioning and spine alignment during self-care, functional mobility activities and/or exercises.                  Learning Progress Summary             Patient Acceptance, E,TB, VU by RW at 9/25/2023 1311    Comment: POC, Role of OT, transfer training   Family Acceptance, E,TB, VU by RW at 9/25/2023 1311    Comment: POC, Role of OT, transfer training                                          User Key       Initials Effective Dates Name Provider Type Discipline     06/14/21 -  Brayden Diaz OT Occupational Therapist OT    RW 09/22/22 -  Meli Melgar OT Occupational Therapist OT                  OT Recommendation and Plan  Planned Therapy Interventions (OT): activity tolerance training, manual therapy/joint mobilization, patient/caregiver education/training, adaptive equipment training, neuromuscular control/coordination retraining, BADL retraining, ROM/therapeutic exercise, cognitive/visual perception retraining, occupation/activity based interventions, strengthening exercise, edema control/reduction, functional balance retraining, IADL retraining, passive ROM/stretching, transfer/mobility retraining  Plan of Care Review  Plan of Care Reviewed With: patient  Outcome Evaluation: OT recert complete, co-tx with PT. Pt supine upon arrival and agreeable to therapy. Pts family present throughout session, educated on gait belt use and transfers, DIL assisted with toilet t/f and gait from bathroom to bed, DIL also reports 3 steps to get into the house, no handrails. Pt perf sup>sit with CGA using log rolling technique. Pt stated R ankle pain while sitting EOB, nsg notified. Pts brace readjusted while sitting EOB. Pt perf sit<>stand with Jordyn and FWW. Pt noted to require frequent verbal and tactile cues for L hand placement during ambulation to bathoom. Pt perf ambulation of 10'x2 with Jordyn and FWW. Pt perf toilet transfer with Jordyn and DIL assist. Pt dependent for clothing management and SBA for perineal hygiene while sitting. Pt perf ambulation back to bed with Jordyn, FWW and DIL assist. Pt perf sit>sup with Jordyn using log rolling technique. Goals updated/revised this date. Cont OT POC. Pt would benefit from rehab to home to increase independence and safety, however family expressed desire have patient d/c to home, patient would have 24/7 assist from family at home and would benefit from  home health OT if d/c home with family.     Time Calculation:         Time Calculation- OT       Row Name 09/25/23 1312             Time Calculation- OT    OT Start Time 1106  -RW      OT Stop Time 1152  -RW      OT Time Calculation (min) 46 min  -RW      Total Timed Code Minutes- OT 30 minute(s)  -RW      OT Non-Billable Time (min) 16 min  -RW      OT Received On 09/25/23  -RW         Timed Charges    48446 - OT Therapeutic Activity Minutes 30  -RW         Total Minutes    Timed Charges Total Minutes 30  -RW       Total Minutes 30  -RW                User Key  (r) = Recorded By, (t) = Taken By, (c) = Cosigned By      Initials Name Provider Type    RW Meli Melgar OT Occupational Therapist                  Therapy Charges for Today       Code Description Service Date Service Provider Modifiers Qty    51173485360 HC OT THERAPEUTIC ACT EA 15 MIN 9/25/2023 Meli Melgar OT GO 2    [id number] HC OT THER SUPP EA 15 MIN 9/25/2023 Meli Melgar OT GO 1                 Meli Melgar OT  9/25/2023

## 2023-09-25 NOTE — PLAN OF CARE
Goal Outcome Evaluation:  Plan of Care Reviewed With: (P) patient, family, grandchild(rojelio)           Outcome Evaluation: (P) PT progress note completed. Patient supine upon arrival and agreeable to therapy. Patients daughter-in-law in the room and reports there are 3 steps into home with no handrails. Daughter-in-law states she will be staying with patient 24/7. Patient performed supine to sit with CGA using log rolling technique. Patient performed sit to stand transfer with a FWW and min A. Patient ambulated 10'x2 to and from bathroom with min A x 2 and a FWW. Patient ambulates with decreased jamil and step length with decreased stance time on R LE d/t pain in foot. R foot painful with palpation at midtarsal bones. Nursing notified of pain. Repetitive manual cues required for appropriate L hand placement on FWW d/t deficits in tone from acute CVA per MRI. Patient performed sit to supine with min A for LE management. Patient is currently unsafe to return home. Barriers to discharge include unsteadiness with gait, difficulty with hand placement on FWW, 3 steps into entrance of home with no hand rail, 80 feet walk from front door to bedroom per daughter-in-law. Patient is currently unable to safely ascend/descend stairs or walk the necessary distance to bed room. Daughter-in-law reports they will work on getting a hand rail placed on steps. Recommend further rehab to improve strength and activity tolerance before d/c home. Continue skilled I/P PT.      Anticipated Discharge Disposition (PT): (P) other (see comments) (Rehab to home.)

## 2023-09-25 NOTE — DISCHARGE SUMMARY
[facility] Medicine Services  DISCHARGE SUMMARY       Date of Admission: 9/21/2023  Date of Discharge:  9/25/2023  Primary Care Physician: Kush Newman MD    Presenting Problem/History of Present Illness:  Rhabdomyolysis [M62.82]  Compression fracture of T12 vertebra, initial encounter [S22.080A]  Traumatic rhabdomyolysis, initial encounter [T79.6XXA]       Final Discharge Diagnoses:  Active Hospital Problems    Diagnosis     **Rhabdomyolysis        Consults:   Consults       Date and Time Order Name Status Description    9/22/2023 12:09 PM Inpatient Cardiology Consult Completed             Procedures Performed:                 Pertinent Test Results:   Lab Results (most recent)       Procedure Component Value Units Date/Time    Extra Tubes [[id number]] Collected: 09/25/23 0624    Specimen: Blood, Venous Line Updated: 09/25/23 0730    Narrative:      The following orders were created for panel order Extra Tubes.  Procedure                               Abnormality         Status                     ---------                               -----------         ------                     Lavender Top[[id number]]                                     Final result                 Please view results for these tests on the individual orders.    Lavender Top [[id number]] Collected: 09/25/23 0624    Specimen: Blood Updated: 09/25/23 0730     Extra Tube hold for add-on     Comment: Auto resulted       Basic Metabolic Panel [[id number]]  (Abnormal) Collected: 09/25/23 0623    Specimen: Blood Updated: 09/25/23 0700     Glucose 104 mg/dL      BUN 15 mg/dL      Creatinine 0.74 mg/dL      Sodium 134 mmol/L      Potassium 4.0 mmol/L      Chloride 106 mmol/L      CO2 20.0 mmol/L      Calcium 8.0 mg/dL      BUN/Creatinine Ratio 20.3     Anion Gap 8.0 mmol/L      eGFR 80.9 mL/min/1.73     Narrative:      GFR Normal >60  Chronic Kidney Disease <60  Kidney Failure <15    The GFR formula  is only valid for adults with stable renal function between ages 18 and 70.    CK [[id number]]  (Normal) Collected: 09/25/23 0623    Specimen: Blood Updated: 09/25/23 0700     Creatine Kinase 128 U/L     Blood Culture - Blood, Hand, Right [[id number]]  (Normal) Collected: 09/21/23 1957    Specimen: Blood from Hand, Right Updated: 09/24/23 2030     Blood Culture No growth at 3 days    Blood Culture - Blood, Arm, Left [[id number]]  (Normal) Collected: 09/21/23 1930    Specimen: Blood from Arm, Left Updated: 09/24/23 1945     Blood Culture No growth at 3 days    Potassium [[id number]]  (Normal) Collected: 09/24/23 1457    Specimen: Blood Updated: 09/24/23 1523     Potassium 4.6 mmol/L     Extra Tubes [[id number]] Collected: 09/24/23 0458    Specimen: Blood, Venous Line Updated: 09/24/23 0600    Narrative:      The following orders were created for panel order Extra Tubes.  Procedure                               Abnormality         Status                     ---------                               -----------         ------                     Lavender Top[[id number]]                                     Final result                 Please view results for these tests on the individual orders.    Lavender Top [[id number]] Collected: 09/24/23 0458    Specimen: Blood Updated: 09/24/23 0600     Extra Tube hold for add-on     Comment: Auto resulted       Basic Metabolic Panel [[id number]]  (Abnormal) Collected: 09/24/23 0458    Specimen: Blood Updated: 09/24/23 0526     Glucose 134 mg/dL      BUN 14 mg/dL      Creatinine 0.70 mg/dL      Sodium 137 mmol/L      Potassium 3.4 mmol/L      Chloride 105 mmol/L      CO2 22.0 mmol/L      Calcium 8.1 mg/dL      BUN/Creatinine Ratio 20.0     Anion Gap 10.0 mmol/L      eGFR 86.5 mL/min/1.73     Narrative:      GFR Normal >60  Chronic Kidney Disease <60  Kidney Failure <15    The GFR formula is only valid for adults with stable renal function between ages 18 and 70.    aPTT [[id number]]   (Abnormal) Collected: 09/23/23 1303    Specimen: Blood Updated: 09/23/23 1707     PTT 50.5 seconds     Narrative:      The recommended Heparin therapeutic range is 68-97 seconds.    Urine Culture - Urine, Urine, Clean Catch [[id number]] Collected: 09/21/23 1808    Specimen: Urine, Clean Catch Updated: 09/23/23 1502     Urine Culture >100,000 CFU/mL Mixed Yanelis Isolated    Narrative:      Specimen contains mixed organisms of questionable pathogenicity suggestive of contamination. If symptoms persist, suggest recollection.  Colonization of the urinary tract without infection is common. Treatment is discouraged unless the patient is symptomatic, pregnant, or undergoing an invasive urologic procedure.    POC Glucose Once [[id number]]  (Abnormal) Collected: 09/23/23 1250    Specimen: Blood Updated: 09/23/23 1303     Glucose 136 mg/dL      Comment: Sliding Scale AdminOperator: [id number] AZALEA WELSHAMeter ID: [id number]       aPTT [[id number]]  (Abnormal) Collected: 09/23/23 0418    Specimen: Blood Updated: 09/23/23 0448     PTT 45.0 seconds     Narrative:      The recommended Heparin therapeutic range is 68-97 seconds.    CBC & Differential [[id number]]  (Abnormal) Collected: 09/23/23 0418    Specimen: Blood Updated: 09/23/23 0442    Narrative:      The following orders were created for panel order CBC & Differential.  Procedure                               Abnormality         Status                     ---------                               -----------         ------                     CBC Auto Differential[[id number]]        Abnormal            Final result               Scan Slide[[id number]]                                                                    Please view results for these tests on the individual orders.    CBC Auto Differential [[id number]]  (Abnormal) Collected: 09/23/23 0418    Specimen: Blood Updated: 09/23/23 0442     WBC 14.03 10*3/mm3      RBC 3.63 10*6/mm3      Hemoglobin 10.1 g/dL       Hematocrit 30.6 %      MCV 84.3 fL      MCH 27.8 pg      MCHC 33.0 g/dL      RDW 14.2 %      RDW-SD 43.9 fl      MPV 8.7 fL      Platelets 531 10*3/mm3      Neutrophil % 67.5 %      Lymphocyte % 17.8 %      Monocyte % 6.8 %      Eosinophil % 2.5 %      Basophil % 0.7 %      Immature Grans % 4.7 %      Neutrophils, Absolute 9.46 10*3/mm3      Lymphocytes, Absolute 2.50 10*3/mm3      Monocytes, Absolute 0.96 10*3/mm3      Eosinophils, Absolute 0.35 10*3/mm3      Basophils, Absolute 0.10 10*3/mm3      Immature Grans, Absolute 0.66 10*3/mm3      nRBC 0.0 /100 WBC     Green Top (Gel) [[id number]] Collected: 09/22/23 2031    Specimen: Blood Updated: 09/22/23 2131     Extra Tube Hold for add-ons.     Comment: Auto resulted.       Potassium [[id number]]  (Normal) Collected: 09/22/23 0534    Specimen: Blood Updated: 09/22/23 0634     Potassium 4.2 mmol/L     Comprehensive Metabolic Panel [[id number]]  (Abnormal) Collected: 09/22/23 0534    Specimen: Blood Updated: 09/22/23 0634     Glucose 154 mg/dL      BUN 23 mg/dL      Creatinine 0.80 mg/dL      Sodium 139 mmol/L      Potassium 4.2 mmol/L      Chloride 108 mmol/L      CO2 21.0 mmol/L      Calcium 8.0 mg/dL      Total Protein 6.2 g/dL      Albumin 2.9 g/dL      ALT (SGPT) 38 U/L      AST (SGOT) 51 U/L      Alkaline Phosphatase 64 U/L      Total Bilirubin 0.4 mg/dL      Globulin 3.3 gm/dL      A/G Ratio 0.9 g/dL      BUN/Creatinine Ratio 28.8     Anion Gap 10.0 mmol/L      eGFR 73.7 mL/min/1.73     Narrative:      GFR Normal >60  Chronic Kidney Disease <60  Kidney Failure <15    The GFR formula is only valid for adults with stable renal function between ages 18 and 70.    CK [[id number]]  (Abnormal) Collected: 09/22/23 0534    Specimen: Blood Updated: 09/22/23 0634     Creatine Kinase 1,083 U/L     Amylase [[id number]]  (Normal) Collected: 09/22/23 0534    Specimen: Blood Updated: 09/22/23 0634     Amylase 66 U/L     TSH [[id number]]  (Normal) Collected: 09/22/23 0534     Specimen: Blood Updated: 09/22/23 0633     TSH 2.960 uIU/mL     Lactic Acid, Plasma [[id number]]  (Normal) Collected: 09/22/23 0534    Specimen: Blood Updated: 09/22/23 0622     Lactate 1.2 mmol/L     Protime-INR [[id number]]  (Abnormal) Collected: 09/22/23 0534    Specimen: Blood Updated: 09/22/23 0619     Protime 15.7 Seconds      INR 1.26    Narrative:      Therapeutic range for most indications is 2.0-3.0 INR,  or 2.5-3.5 for mechanical heart valves.    CBC & Differential [[id number]]  (Abnormal) Collected: 09/22/23 0534    Specimen: Blood Updated: 09/22/23 0612    Narrative:      The following orders were created for panel order CBC & Differential.  Procedure                               Abnormality         Status                     ---------                               -----------         ------                     Manual Differential[[id number]]                                                         CBC Auto Differential[[id number]]        Abnormal            Final result                 Please view results for these tests on the individual orders.    CBC Auto Differential [[id number]]  (Abnormal) Collected: 09/22/23 0534    Specimen: Blood Updated: 09/22/23 0612     WBC 11.96 10*3/mm3      RBC 3.67 10*6/mm3      Hemoglobin 10.2 g/dL      Hematocrit 30.5 %      MCV 83.1 fL      MCH 27.8 pg      MCHC 33.4 g/dL      RDW 14.0 %      RDW-SD 42.1 fl      MPV 8.8 fL      Platelets 452 10*3/mm3      Neutrophil % 76.9 %      Lymphocyte % 12.5 %      Monocyte % 7.4 %      Eosinophil % 1.1 %      Basophil % 0.3 %      Neutrophils, Absolute 9.19 10*3/mm3      Lymphocytes, Absolute 1.50 10*3/mm3      Monocytes, Absolute 0.89 10*3/mm3      Eosinophils, Absolute 0.13 10*3/mm3      Basophils, Absolute 0.04 10*3/mm3     Hemoglobin A1c [[id number]]  (Abnormal) Collected: 09/22/23 0534    Specimen: Blood Updated: 09/22/23 0555     Hemoglobin A1C 5.70 %     Narrative:      Hemoglobin A1C Ranges:    Increased Risk for Diabetes   5.7% to 6.4%  Diabetes                     >= 6.5%  Diabetic Goal                < 7.0%    Respiratory Panel PCR w/COVID-19(SARS-CoV-2) ROB/KYLER/JOSH/PAD/COR/MAD/JACIEL In-House, NP Swab in UTM/VTM, 3-4 HR TAT - Swab, Nasopharynx [[id number]]  (Normal) Collected: 09/21/23 2129    Specimen: Swab from Nasopharynx Updated: 09/21/23 2223     ADENOVIRUS, PCR Not Detected     Coronavirus 229E Not Detected     Coronavirus HKU1 Not Detected     Coronavirus NL63 Not Detected     Coronavirus OC43 Not Detected     COVID19 Not Detected     Human Metapneumovirus Not Detected     Human Rhinovirus/Enterovirus Not Detected     Influenza A PCR Not Detected     Influenza B PCR Not Detected     Parainfluenza Virus 1 Not Detected     Parainfluenza Virus 2 Not Detected     Parainfluenza Virus 3 Not Detected     Parainfluenza Virus 4 Not Detected     RSV, PCR Not Detected     Bordetella pertussis pcr Not Detected     Bordetella parapertussis PCR Not Detected     Chlamydophila pneumoniae PCR Not Detected     Mycoplasma pneumo by PCR Not Detected    Narrative:      In the setting of a positive respiratory panel with a viral infection PLUS a negative procalcitonin without other underlying concern for bacterial infection, consider observing off antibiotics or discontinuation of antibiotics and continue supportive care. If the respiratory panel is positive for atypical bacterial infection (Bordetella pertussis, Chlamydophila pneumoniae, or Mycoplasma pneumoniae), consider antibiotic de-escalation to target atypical bacterial infection.    Urinalysis, Microscopic Only - Urine, Clean Catch [[id number]]  (Abnormal) Collected: 09/21/23 1808    Specimen: Urine, Clean Catch Updated: 09/21/23 1830     RBC, UA 13-20 /HPF      WBC, UA Too Numerous to Count /HPF      Bacteria, UA 4+ /HPF      Squamous Epithelial Cells, UA 6-12 /HPF      Hyaline Casts, UA None Seen /LPF      Methodology Manual Light Microscopy    Urinalysis With Microscopic If Indicated (No  Culture) - Urine, Clean Catch [[id number]]  (Abnormal) Collected: 09/21/23 1808    Specimen: Urine, Clean Catch Updated: 09/21/23 1817     Color, UA Yellow     Appearance, UA Turbid     pH, UA 5.5     Specific [place], UA 1.039     Comment: Result obtained by Refractometer        Glucose, UA Negative     Ketones, UA Trace     Bilirubin, UA Negative     Blood, UA Small (1+)     Protein,  mg/dL (2+)     Leuk Esterase, UA Moderate (2+)     Nitrite, UA Negative     Urobilinogen, UA 1.0 E.U./dL    Comprehensive Metabolic Panel [[id number]]  (Abnormal) Collected: 09/21/23 1421    Specimen: Blood Updated: 09/21/23 1451     Glucose 122 mg/dL      BUN 31 mg/dL      Creatinine 1.33 mg/dL      Sodium 140 mmol/L      Potassium 3.4 mmol/L      Chloride 104 mmol/L      CO2 21.0 mmol/L      Calcium 8.3 mg/dL      Total Protein 6.1 g/dL      Albumin 2.7 g/dL      ALT (SGPT) 41 U/L      AST (SGOT) 72 U/L      Alkaline Phosphatase 65 U/L      Total Bilirubin 0.6 mg/dL      Globulin 3.4 gm/dL      A/G Ratio 0.8 g/dL      BUN/Creatinine Ratio 23.3     Anion Gap 15.0 mmol/L      eGFR 40.0 mL/min/1.73     Narrative:      GFR Normal >60  Chronic Kidney Disease <60  Kidney Failure <15    The GFR formula is only valid for adults with stable renal function between ages 18 and 70.    Single High Sensitivity Troponin T [[id number]]  (Abnormal) Collected: 09/21/23 1421    Specimen: Blood Updated: 09/21/23 1451     HS Troponin T 44 ng/L     Narrative:      High Sensitive Troponin T Reference Range:  <10.0 ng/L- Negative Female for AMI  <15.0 ng/L- Negative Male for AMI  >=10 - Abnormal Female indicating possible myocardial injury.  >=15 - Abnormal Male indicating possible myocardial injury.   Clinicians would have to utilize clinical acumen, EKG, Troponin, and serial changes to determine if it is an Acute Myocardial Infarction or myocardial injury due to an underlying chronic condition.         Magnesium [[id number]]  (Normal) Collected:  09/21/23 1421    Specimen: Blood Updated: 09/21/23 1451     Magnesium 1.9 mg/dL     BNP [[id number]]  (Abnormal) Collected: 09/21/23 1421    Specimen: Blood Updated: 09/21/23 1449     proBNP 2,180.0 pg/mL     Narrative:      Among patients with dyspnea, NT-proBNP is highly sensitive for the detection of acute congestive heart failure. In addition NT-proBNP of <300 pg/ml effectively rules out acute congestive heart failure with 99% negative predictive value.      Lactic Acid, Plasma [[id number]]  (Normal) Collected: 09/21/23 1421    Specimen: Blood Updated: 09/21/23 1445     Lactate 2.0 mmol/L           Imaging Results (Most Recent)       Procedure Component Value Units Date/Time    MRI Brain With & Without Contrast [[id number]] Collected: 09/23/23 1628     Updated: 09/23/23 1704    Narrative:      MRI brain with and without contrast:    COMPARISON:  No comparison.    History:  Found down after 30 hours.    TECHNIQUE:  Multi-planar, multi-sequence images were obtained through the brain before and  after administration of 14 mL ProHance IV contrast.    FINDINGS:  A tiny single subcentimeter focus of restricted diffusion in the superior left  frontal region.  Scattered foci of restricted diffusion involving the right  frontal, parietal and occipital lobes.    No midline shift, mass effect or hydrocephalus.  Scattered T2 and FLAIR signal  abnormalities in the subcortical and periventricular white matter suggesting  chronic small vessel ischemic changes for age.  The postcontrast sequences are  markedly limited by motion.  No gross intracranial enhancement appreciated.  Large vessels at skull base demonstrate grossly normal signal voids.      Impression:      Impression:    1.  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    2. No midline shift or mass effect.    3.  The postcontrast  sequences are markedly limited by motion.  No gross  abnormal intracranial enhancement.    XR Knee 3 View Bilateral [[id number]] Collected: 09/23/23 1132     Updated: 09/23/23 1221    Narrative:      INDICATION:  sp fall.    COMPARISON:  None relevant.    FINDINGS:  3 views of both knees were obtained.  Neither knee shows acute fracture,  dislocation or malalignment.  Severe degenerative changes throughout the right  knee.    US Guided Vascular Access [[id number]] Collected: 09/23/23 1052     Updated: 09/23/23 1217    Narrative:      Ultrasound guidance vascular    FINDINGS:  Real-time ultrasound imaging was provided for vascular access.  Please refer to  procedure note for real-time exam findings.  The right basilic vein was found to  be patent and compressible.  Access under direct sonographic visualization.  Permanent saved images sent to the PACS for documentation.      IR Insert Midline Without Port Pump 5 Plus [[id number]] Resulted: 09/23/23 1029     Updated: 09/23/23 1029    Narrative:      This procedure was auto-finalized with no dictation required.    XR Hand 2 View Left [[id number]] Collected: 09/22/23 1407     Updated: 09/22/23 1413    Narrative:      TECHNIQUE:  PA, oblique and lateral views of the left hand    HISTORY:  Pain and swelling.    COMPARISON:  None.    FINDINGS:  Prominent arthritis at the index DIP joint, probably degenerative.  Less severe  joint space narrowing throughout the remainder of the IP joints of the fingers  and thumb.  No erosion.  Diffuse osteopenia.  No fracture is seen.    Please refer to separate dictation of the wrist.        XR Forearm 2 View Left [[id number]] Collected: 09/22/23 1352     Updated: 09/22/23 1355    Narrative:      History: pain    COMPARISON: None    FINDINGS:  AP and lateral views were obtained.    There is no fracture, dislocation or osseous destruction.        Impression:      No acute abnormality.      XR Wrist 2 View Left [[id number]] Collected: 09/22/23  1337     Updated: 09/22/23 1342    Narrative:      XR WRIST LEFT 2 VIEWS    HISTORY: pain    COMPARISON: None    FINDINGS:      No significant soft tissue swelling.    No fracture or dislocation.    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          XR Pelvis 1 or 2 View [[id number]] Collected: 09/22/23 1253     Updated: 09/22/23 1258    Narrative:      XR PELVIS 1 OR 2 VIEWS    HISTORY: sp fall    COMPARISON: None    FINDINGS:  There is no evidence of fracture or dislocation.    The joint spaces are maintained.    Low-lying bladder I cannot exclude partial prolapse..          Impression:      1. No acute fracture. Low-lying bladder. I cannot exclude partial prolapse..        US Carotid Bilateral [[id number]] Collected: 09/21/23 2119     Updated: 09/21/23 2135    Narrative:      COMPARISON:  None.    TECHNIQUE:  High-resolution gray scale imaging, color Doppler, velocity recordings, and  spectral analysis of the common carotid, external carotid, and internal carotid  arteries bilaterally were obtained.  Waveform analysis of the vertebral arteries  bilaterally was completed.  Using consensus data for these images, velocity  values and waveform analysis, a standard percent stenosis is reported.  Velocity  criteria are extrapolated from diameter data derived from the Intersocietal  Accreditation Committee recommended amendment to the Society of Radiologists in  ultrasound Consensus Conference in 2003; 229;340-346.  Reference: IAC Carotid  Criteria White Paper 1-2014.    FINDINGS:  Right: Peak systolic velocity in the right common carotid is 69 cm/s and 77 cm/s  in the right internal carotid artery. There is antegrade flow in the right  vertebral. The peak systolic internal to common carotid ratio is 1.1.    Left: Peak systolic velocity in the left common carotid is 74 cm/s and 64 cm/s  in the left internal carotid artery. There is  antegrade flow in the left  vertebral. Peak systolic internal to common carotid ratio on the left 0.8.    There is mild plaque in both carotid bulbs.      Impression:      1. Mild plaque in both carotid bulbs with stenosis less than 50% by hemodynamic  criteria.    CT Thoracic Spine Without Contrast [[id number]] Collected: 09/21/23 1643     Updated: 09/21/23 1921    Narrative:      INDICATION:  Trauma.    COMPARISON:  None relevant.    TECHNIQUE:  Helical CT of the thoracic spine was performed without intravenous contrast.  Multiplanar reformations were provided.    FINDINGS:  There is height loss of T12 which is age indeterminate, but appears to be  chronic.  There may be an acute component.  Remainder of the thoracic spine  shows no acute trauma.  Alignment appears to be intact.  There is multilevel  spondylosis.      Impression:      1.  Probable acute on chronic superior endplate fracture of T12.  If there is  further need to date the fracture, MRI can be obtained to assess for the  presence of edema.  2.  No additional compression deformities are seen within the thoracic spine.        CT Lumbar Spine Without Contrast [[id number]] Collected: 09/21/23 1646     Updated: 09/21/23 1921    Narrative:      INDICATION:  Trauma.    COMPARISON:  None relevant.    TECHNIQUE:  Helical CT of the lumbar spine was performed without intravenous contrast.  Multiplanar reformations were provided.    FINDINGS:  There is an acute on chronic appearing fracture of T12.  Within the lumbar spine  alignment appears to be intact.  No additional fractures are seen.  Mild  multilevel spondylosis and facet arthrosis.  Diffuse vascular calcification.      Impression:      Acute on chronic appearing superior endplate compression deformity  of T12.  No evidence of fracture within the lumbar spine.        CT Chest With Contrast Diagnostic [[id number]] Collected: 09/21/23 1641     Updated: 09/21/23 1920    Narrative:       INDICATION:  Trauma.    COMPARISON:  None relevant.    TECHNIQUE:  Helical CT of the chest was performed with intravenous contrast.  Multiplanar  reformations were provided.    FINDINGS:  Cardiac size is not enlarged.  Diffuse vascular calcification.  No pleural  effusion or pneumothorax.  No worrisome pulmonary nodules.  No acute osseous  trauma is visualized.  Chronic right-sided rib fractures.  T12 fracture.      Impression:      1.  Probable acute on chronic superior endplate fracture of T12.  If there is  further need to date the fracture, MRI can be obtained to assess for the  presence of edema.  2.  No additional acute process within the thoracic spine.        CT Abdomen Pelvis With Contrast [[id number]] Collected: 09/21/23 1642     Updated: 09/21/23 1920    Narrative:      INDICATION:  Trauma.    COMPARISON:  None relevant.    TECHNIQUE:  Helical CT of the abdomen and pelvis was performed with intravenous contrast.  Multiplanar reformations were provided.    FINDINGS:  Lung bases are clear.    Liver, spleen, adrenals, pancreas stomach and kidneys show no acute process.  No  free air or free fluid.  Normal volume of colonic stool.  Cholelithiasis.      Impression:      Negative for acute trauma within the abdomen or pelvis.      CT Cervical Spine Without Contrast [[id number]] Collected: 09/21/23 1612     Updated: 09/21/23 1638    Narrative:      CT CERVICAL SPINE WITHOUT CONTRAST:    INDICATION:  Trauma.    TECHNIQUE:  Axial images from the base of the skull through the thoracic inlet were  performed followed by 2D multiplanar reformats.    FINDINGS:  There is degenerative change and osteopenia.  Study is negative for fracture or  other acute abnormality.    SUMMARY:  No acute findings.    CT Head Without Contrast [[id number]] Collected: 09/21/23 1611     Updated: 09/21/23 1638    Narrative:      CT HEAD WITHOUT CONTRAST:    INDICATION:  Trauma.    TECHNIQUE:  Axial images were performed from the calvarium  through the base of the skull  followed by 2D multiplanar reformats.    FINDINGS:  There is age-related atrophy.  No hemorrhage or other acute abnormality seen.  No focal abnormal densities.    SUMMARY:  No acute findings.    CT Maxillofacial Without Contrast [[id number]] Collected: 09/21/23 1613     Updated: 09/21/23 1638    Narrative:      INDICATION:  Trauma.    TECHNIQUE:  Axial images from the base of the skull through the mandible were performed  followed by 2D multiplanar reformats.    FINDINGS:  No fracture or other acute abnormality demonstrated.  Paraspinal sinuses are  clear.    SUMMARY:  Negative.            Chief Complaint on Day of Discharge: none    Hospital Course:  The patient is a 83 y.o. female who presented to [facility] with sp fall in the bathroom and staying 2 overnights maybe on the floor.   83-year-old male comes to the ER from home after being found on the floor of her bathroom. Spoke to her sons Bruce and Bao, they found her on the bathroom floor, maybe after 36 hours. Today son says she complained of numbness left forearm 3 days ago.  Patient does not remember what happened, now more oriented and denies chest pain, no shortness of breath, no fever, no urinary or GI symptoms. Family thinks she was on the ground for 2 nights. She is complaining of left hip pain. PMH: Type 2 diabetes, hyperlipidemia, essential hypertension, osteoporosis. Today alert and more oriented with his son at bedside  Assessment and Plan: 83 years old female patient found on the floor after 2 days, probable cardiogenic syncope, afib RVR. Had renal failure, rhabdomyolysis. Hypokalemia. Leucocytosis from UTI.  Now on full anticoagulation and rate control off  cardizem drip, on telemetry. MRI: Scattered areas of acute infarct predominantly within the right frontal, parietal, and occipital lobes with a single small subcentimeter focus in the superior left frontal lobe.  Problems:  1-Atrial fibrillation  "with RVR, Cardizem drip was stopped. Probable Afib RVR at home that caused syncope. Lopressor titrated by cardiology. Discussed with Bruce (son) today about discharge planning today after PT OT evaluation  2-Rhabdomyolysis, am labs, improving, CK today normal 128  3-Renal failure, prerenal. Resolved  4-UTI. High WBC, to follow, urine and blood cultures in process, abnormal urine analysis. UTI. Urine culture with contamination. Started Rocephin, can leave with PO meds  5-Hypokalemia, electrolyte replacement protocol. resolved  6-Old T12 and right ribs fractures, underlined osteoporosis  7-Frequent falls, PT OT, X rays knees only degenerative changes  8-HFpEF, left atrium enlargement, tricuspid regurgitation, group 2 pulmonary hypertension, cardiology on board. On betablocker and CCB  9-osteoarthritis of both knees and hands     Condition on Discharge:  good    Physical Exam on Discharge:  BP 97/47 (BP Location: Left arm, Patient Position: Lying)   Pulse 57   Temp 99.6 °F (37.6 °C) (Temporal)   Resp 18   Ht 170.2 cm (67.01\")   Wt 66.3 kg (146 lb 3.2 oz)   SpO2 95%   BMI 22.89 kg/m²   Physical Exam  Constitutional:       Appearance: Normal appearance.   HENT:      Head: Normocephalic and atraumatic.      Right Ear: Tympanic membrane normal.      Left Ear: Tympanic membrane normal.      Nose: Nose normal.      Mouth/Throat:      Mouth: Mucous membranes are moist.   Eyes:      Pupils: Pupils are equal, round, and reactive to light.   Cardiovascular:      Rate and Rhythm: Normal rate and regular rhythm.      Pulses: Normal pulses.      Heart sounds: Normal heart sounds.   Pulmonary:      Effort: Pulmonary effort is normal.      Breath sounds: Normal breath sounds.   Abdominal:      General: Abdomen is flat. Bowel sounds are normal.      Palpations: Abdomen is soft.   Musculoskeletal:         General: Normal range of motion.      Cervical back: Normal range of motion and neck supple.   Skin:     General: Skin is " warm and dry.      Capillary Refill: Capillary refill takes less than 2 seconds.   Neurological:      General: No focal deficit present.      Mental Status: She is alert and oriented to person, place, and time.   Psychiatric:         Mood and Affect: Mood normal.         Behavior: Behavior normal.         Thought Content: Thought content normal.         Judgment: Judgment normal.         Discharge Disposition:  [facility]    Discharge Medications:     Discharge Medications        New Medications        Instructions Start Date   amLODIPine 10 MG tablet  Commonly known as: NORVASC   10 mg, Oral, Every 24 Hours Scheduled      apixaban 5 MG tablet tablet  Commonly known as: ELIQUIS   5 mg, Oral, Every 12 Hours Scheduled      famotidine 40 MG tablet  Commonly known as: PEPCID   40 mg, Oral, Daily      metoprolol tartrate 100 MG tablet  Commonly known as: LOPRESSOR   100 mg, Oral, Every 12 Hours Scheduled             Continue These Medications        Instructions Start Date   lisinopril 40 MG tablet  Commonly known as: PRINIVIL,ZESTRIL   40 mg, Oral, Daily               Discharge Diet:   Diet Instructions       Diet: Cardiac Diets; No Salt Packet; Regular Texture (IDDSI 7); Thin (IDDSI 0)      Discharge Diet: Cardiac Diets    Cardiac Diet: No Salt Packet    Texture: Regular Texture (IDDSI 7)    Fluid Consistency: Thin (IDDSI 0)            Activity at Discharge:   Activity Instructions       Activity as Tolerated              Discharge Care Plan/Instructions: home health    Follow-up Appointments:   No future appointments.    Test Results Pending at Discharge:   Pending Labs       Order Current Status    Blood Culture - Blood, Arm, Left Preliminary result    Blood Culture - Blood, Hand, Right Preliminary result            The patient has current or prior documentation of left ventricular ejection fraction (LVEF) less than or equal to 40%, or moderate or severely depressed left ventricular systolic function. ; The  patient was prescribed or already taking a beta-blocker.      This document has been electronically signed by Gabriele Franco MD on September 25, 2023 08:56 CDT      Time: 8:55

## 2023-09-25 NOTE — DISCHARGE PLACEMENT REQUEST
"Eloisa Hernandez (83 y.o. Female)       Date of Birth   1940    Social Security Number       Address   [address] [place] [address]    Home Phone   [phone]    MRN   [id number]       Gnosticist   Non-Uatsdin    Marital Status                               Admission Date   9/21/23    Admission Type   Emergency    Admitting Provider   Alex Salcedo MD    Attending Provider   Alex Salcedo MD    Department, Room/Bed   [address], 434/1       Discharge Date       Discharge Disposition       Discharge Destination                                 Attending Provider: Alex Salcedo MD    Allergies: No Known Allergies    Isolation: None   Infection: None   Code Status: CPR    Ht: 170.2 cm (67.01\")   Wt: 66.3 kg (146 lb 3.2 oz)    Admission Cmt: None   Principal Problem: Rhabdomyolysis [M62.82]                   Active Insurance as of 9/21/2023       Primary Coverage       Payor Plan Insurance Group Employer/Plan Group    MEDICARE MEDICARE A & B        Payor Plan Address Payor Plan Phone Number Payor Plan Fax Number Effective Dates    [address] [phone]  4/1/2006 - None Entered    [facility] [id number]         Subscriber Name Subscriber Birth Date Member ID       ELOISA HERNANDEZ 1940 [id number]               Secondary Coverage       Payor Plan Insurance Group Employer/Plan Group    [employer] [place] [employer] [place]        Payor Plan Address Payor Plan Phone Number Payor Plan Fax Number Effective Dates    [address] [place] [address] [phone]  1/1/2011 - None Entered    [facility] [id number]         Subscriber Name Subscriber Birth Date Member ID       ELOISA HERNANDEZ 1940 [id number]                     Emergency Contacts        (Rel.) Home Phone Work Phone Mobile Phone    MARYDALLAS (Son) [phone] -- [phone]    MARYGILMA (Son) [phone] -- [phone]            [facility] " "[address] [address]  Dept. Phone:  [phone]  Dept. Fax:   Date Ordered: Sep 25, 2023         Patient:  Eloisa Chang MRN:  [id number]   [facility] [address] :  1940  SSN:    Phone: [phone] Sex:  F     Weight: 66.3 kg (146 lb 3.2 oz)         Ht Readings from Last 1 Encounters:   23 170.2 cm (67.01\")         Miscellaneous DME   (Order ID: [id number])    Diagnosis:  Traumatic rhabdomyolysis, initial encounter (T79.6XXA [ICD-10-CM] 958.6 [ICD-9-CM])  Compression fracture of T12 vertebra, initial encounter (S22.080A [ICD-10-CM] 805.2 [ICD-9-CM])  Impaired mobility and ADLs (Z74.09,Z78.9 [ICD-10-CM] V49.89 [ICD-9-CM])  Impaired functional mobility, balance, gait, and endurance (Z74.09 [ICD-10-CM] V49.89 [ICD-9-CM])   Quantity:  1     Type of DME: rolling walker  Length of Need (99 Months = Lifetime): 99 Months = Lifetime        Authorizing Provider's Phone: [phone]  Verbal Order Mode: Telephone with readback   Authorizing Provider: Gabriele Franco MD  Authorizing Provider's NPI: [id number]     Order Entered By: Magda Bishop RN 2023  8:44 AM     Electronically signed by:         "

## 2023-09-25 NOTE — PLAN OF CARE
Goal Outcome Evaluation:  Plan of Care Reviewed With: patient           Outcome Evaluation: OT recert complete, co-tx with PT. Pt supine upon arrival and agreeable to therapy. Pts family present throughout session, educated on gait belt use and transfers, DIL assisted with toilet t/f and gait from bathroom to bed, DIL also reports 3 steps to get into the house, no handrails. Pt perf sup>sit with CGA using log rolling technique. Pt stated R ankle pain while sitting EOB, nsg notified. Pts brace readjusted while sitting EOB. Pt perf sit<>stand with Jordyn and FWW. Pt noted to require frequent verbal and tactile cues for L hand placement during ambulation to bathoom. Pt perf ambulation of 10'x2 with Jordyn and FWW. Pt perf toilet transfer with Jordyn and DIL assist. Pt dependent for clothing management and SBA for perineal hygiene while sitting. Pt perf ambulation back to bed with Jordyn, FWW and DIL assist. Pt perf sit>sup with Jordyn using log rolling technique. Goals updated/revised this date. Cont OT POC. Pt would benefit from rehab to home to increase independence and safety, however family expressed desire have patient d/c to home, patient would have 24/7 assist from family at home and would benefit from home health OT if d/c home with family.

## 2023-09-25 NOTE — PROGRESS NOTES
[facility] Medicine Services  INPATIENT PROGRESS NOTE    Length of Stay: 2  Date of Admission: 9/21/2023  Primary Care Physician: Kush Newman MD    Subjective   Chief Complaint: none  HPI:  83-year-old male comes to the ER from home after being found on the floor of her bathroom. Spoke to her sons Bruce and Bao, they found her on the bathroom floor, maybe after 36 hours. Today son says she complained of numbness left forearm 3 days ago.  Patient does not remember what happened, now more oriented and denies chest pain, no shortness of breath, no fever, no urinary or GI symptoms. Family thinks she was on the ground for 2 nights. She is complaining of left hip pain. PMH: Type 2 diabetes, hyperlipidemia, essential hypertension, osteoporosis. Today alert and more oriented with his son at bedside    As of today, 09/25/23  Review of Systems   Constitutional:  Negative for activity change, appetite change, chills, diaphoresis and fatigue.   HENT:  Negative for congestion, ear discharge, hearing loss, rhinorrhea, sinus pain, sneezing and sore throat.    Eyes:  Negative for photophobia, discharge and visual disturbance.   Respiratory:  Negative for cough, chest tightness, shortness of breath and wheezing.    Cardiovascular:  Negative for chest pain and palpitations.   Gastrointestinal:  Negative for abdominal distention, abdominal pain, blood in stool, diarrhea, nausea and vomiting.   Endocrine: Negative for cold intolerance, heat intolerance, polydipsia, polyphagia and polyuria.   Genitourinary:  Negative for dysuria, flank pain, hematuria and urgency.   Musculoskeletal:  Negative for arthralgias, joint swelling and myalgias.   Skin:  Negative for color change.   Allergic/Immunologic: Negative for food allergies.   Neurological:  Negative for dizziness, seizures, syncope, speech difficulty, weakness and headaches.   Hematological:  Negative for adenopathy. Does not  bruise/bleed easily.   Psychiatric/Behavioral:  Negative for confusion, hallucinations, self-injury and suicidal ideas. The patient is not nervous/anxious.       All pertinent negatives and positives are as above. All other systems have been reviewed and are negative unless otherwise stated.    Objective    Temp:  [98.4 °F (36.9 °C)-99.6 °F (37.6 °C)] 99.6 °F (37.6 °C)  Heart Rate:  [57-78] 57  Resp:  [16-18] 18  BP: ()/(47-71) 97/47  Patient Vitals for the past 24 hrs:   BP Temp Temp src Pulse Resp SpO2 Weight   09/25/23 0430 97/47 99.6 °F (37.6 °C) Temporal -- 18 95 % 66.3 kg (146 lb 3.2 oz)   09/25/23 0157 -- -- -- 57 -- -- --   09/24/23 2142 125/65 99 °F (37.2 °C) -- 74 18 96 % --   09/24/23 1928 -- -- -- 68 -- -- --   09/24/23 1442 129/59 99.1 °F (37.3 °C) Temporal 62 16 96 % --   09/24/23 1331 -- -- -- 60 -- -- --   09/24/23 1255 140/62 98.8 °F (37.1 °C) Temporal 66 16 96 % --   09/24/23 1200 129/69 -- -- 59 -- 97 % --   09/24/23 1100 132/58 -- -- 57 -- 96 % --   09/24/23 1000 131/67 -- -- 76 -- 98 % --   09/24/23 0900 138/65 -- -- 78 16 95 % --   09/24/23 0800 130/71 98.4 °F (36.9 °C) Oral 78 16 94 % --      AM-PAC 6 Clicks Score (PT): 16 (09/24/23 2141)    As of today, 09/25/23  Physical Exam  Constitutional:       Appearance: Normal appearance.   HENT:      Head: Normocephalic and atraumatic.      Right Ear: Tympanic membrane normal.      Left Ear: Tympanic membrane normal.      Nose: Nose normal.      Mouth/Throat:      Mouth: Mucous membranes are moist.   Eyes:      Pupils: Pupils are equal, round, and reactive to light.   Cardiovascular:      Rate and Rhythm: Normal rate and regular rhythm.      Pulses: Normal pulses.      Heart sounds: Normal heart sounds.   Pulmonary:      Effort: Pulmonary effort is normal.      Breath sounds: Normal breath sounds.   Abdominal:      General: Abdomen is flat. Bowel sounds are normal.      Palpations: Abdomen is soft.   Musculoskeletal:         General: Normal  range of motion.      Cervical back: Normal range of motion and neck supple.   Skin:     General: Skin is warm and dry.      Capillary Refill: Capillary refill takes less than 2 seconds.   Neurological:      General: No focal deficit present.      Mental Status: She is alert and oriented to person, place, and time.   Psychiatric:         Mood and Affect: Mood normal.         Behavior: Behavior normal.         Thought Content: Thought content normal.         Judgment: Judgment normal.         Results Review:  I have reviewed the labs, radiology results, and diagnostic studies.    Laboratory Data:   Results from last 7 days   Lab Units 09/25/23  0623 09/24/23  1457 09/24/23  0458 09/23/23  0418 09/22/23  0534 09/21/23  1421   SODIUM mmol/L 134*  --  137 137 139 140   POTASSIUM mmol/L 4.0 4.6 3.4* 4.3 4.2  4.2 3.4*   CHLORIDE mmol/L 106  --  105 106 108* 104   CO2 mmol/L 20.0*  --  22.0 22.0 21.0* 21.0*   BUN mg/dL 15  --  14 17 23 31*   CREATININE mg/dL 0.74  --  0.70 0.82 0.80 1.33*   GLUCOSE mg/dL 104*  --  134* 145* 154* 122*   CALCIUM mg/dL 8.0*  --  8.1* 8.3* 8.0* 8.3*   BILIRUBIN mg/dL  --   --   --   --  0.4 0.6   ALK PHOS U/L  --   --   --   --  64 65   ALT (SGPT) U/L  --   --   --   --  38* 41*   AST (SGOT) U/L  --   --   --   --  51* 72*   ANION GAP mmol/L 8.0  --  10.0 9.0 10.0 15.0     Estimated Creatinine Clearance: 61.3 mL/min (by C-G formula based on SCr of 0.74 mg/dL).  Results from last 7 days   Lab Units 09/21/23  1421   MAGNESIUM mg/dL 1.9         Results from last 7 days   Lab Units 09/23/23  0418 09/22/23  0534 09/21/23  1421   WBC 10*3/mm3 14.03* 11.96* 15.49*   HEMOGLOBIN g/dL 10.1* 10.2* 10.7*   HEMATOCRIT % 30.6* 30.5* 31.0*   PLATELETS 10*3/mm3 531* 452* 419     Results from last 7 days   Lab Units 09/22/23  0534   INR  1.26*       Culture Data:   No results found for: BLOODCX  No results found for: URINECX  No results found for: RESPCX  No results found for: WOUNDCX  No results found for:  STOOLCX  No components found for: BODYFLD    Radiology Data:   Imaging Results (Last 24 Hours)       ** No results found for the last 24 hours. **            I have utilized all available immediate resources to obtain, update, or review the patient's current medications (including all prescriptions, over-the-counter products, herbals, cannabis/cannabidiol products, and vitamin/mineral/dietary (nutritional) supplements).       Assessment/Plan     Active Hospital Problems    Diagnosis     **Rhabdomyolysis          Assessment and Plan: 83 years old female patient found on the floor after 2 days, probable cardiogenic syncope, afib RVR. Had renal failure, rhabdomyolysis. Hypokalemia. Leucocytosis from UTI.  Now on full anticoagulation and rate control off  cardizem drip, on telemetry. MRI: Scattered areas of acute infarct predominantly within the right frontal, parietal, and occipital lobes with a single small subcentimeter focus in the superior left frontal lobe.  Problems:  1-Atrial fibrillation with RVR, Cardizem drip was stopped. Probable Afib RVR at home that caused syncope. Lopressor titrated by cardiology. Discussed with Bruce (son) today about discharge planning today after PT OT evaluation  2-Rhabdomyolysis, am labs, improving, CK today normal 128  3-Renal failure, prerenal. Resolved  4-UTI. High WBC, to follow, urine and blood cultures in process, abnormal urine analysis. UTI. Urine culture with contamination. Started Rocephin, can leave with PO meds  5-Hypokalemia, electrolyte replacement protocol. resolved  6-Old T12 and right ribs fractures, underlined osteoporosis  7-Frequent falls, PT OT, X rays knees only degenerative changes  8-HFpEF, left atrium enlargement, tricuspid regurgitation, group 2 pulmonary hypertension, cardiology on board. On betablocker and CCB  9-osteoarthritis of both knees and hands    Medical Decision Making  Number and Complexity of problems: 5 major  Differential Diagnosis: atrial  fibrillation RVR    Conditions and Status:        Condition is improving.     [facility] Data  External documents reviewed: previous medical records  My EKG interpretation: atrial fibrillation  My CT interpretation: Chest ribs cage fractures, T12 compression fracture  Tests considered but not ordered: nnne     Decision rules/scores evaluated (example [id number], Wells, etc): 7     Discussed with: sandra Barrera     Treatment Plan  DC planning  PO antibiotics at discharge  Apixaban  Betablocker and CCB    Care Planning  Shared decision making: Sandra Barrera  Code status and discussions: full code    Disposition  Social Determinants of Health that impact treatment or disposition: none  I expect the patient to be discharged to home in 1 days.       I confirmed that the patient's Advance Care Plan is present, code status is documented, or surrogate decision maker is listed in the patient's medical record.      This document has been electronically signed by Gabriele Franco MD on September 25, 2023 07:35 CDT

## 2023-09-25 NOTE — PLAN OF CARE
Goal Outcome Evaluation: Pt vitals are stable. Pt and family voices concerns about pt not being active enough to be discharged. Pt right ankle tender. XRAY done. Pt using bedpan. Pt tolerating antibiotic. Pt has been resting between care. Eliquis adm.

## 2023-09-26 LAB
ANISOCYTOSIS BLD QL: ABNORMAL
BACTERIA SPEC AEROBE CULT: NORMAL
BACTERIA SPEC AEROBE CULT: NORMAL
BASOPHILS # BLD AUTO: 0.09 10*3/MM3 (ref 0–0.2)
BASOPHILS # BLD MANUAL: 0.4 10*3/MM3 (ref 0–0.2)
BASOPHILS NFR BLD AUTO: 0.7 % (ref 0–1.5)
BASOPHILS NFR BLD MANUAL: 3 % (ref 0–1.5)
DEPRECATED RDW RBC AUTO: 43.8 FL (ref 37–54)
EOSINOPHIL # BLD AUTO: 0.56 10*3/MM3 (ref 0–0.4)
EOSINOPHIL # BLD MANUAL: 0.4 10*3/MM3 (ref 0–0.4)
EOSINOPHIL NFR BLD AUTO: 4.2 % (ref 0.3–6.2)
EOSINOPHIL NFR BLD MANUAL: 3 % (ref 0.3–6.2)
ERYTHROCYTE [DISTWIDTH] IN BLOOD BY AUTOMATED COUNT: 14.5 % (ref 12.3–15.4)
HCT VFR BLD AUTO: 28.6 % (ref 34–46.6)
HGB BLD-MCNC: 9.4 G/DL (ref 12–15.9)
IMM GRANULOCYTES # BLD AUTO: 1.05 10*3/MM3 (ref 0–0.05)
IMM GRANULOCYTES NFR BLD AUTO: 7.8 % (ref 0–0.5)
LYMPHOCYTES # BLD AUTO: 2.7 10*3/MM3 (ref 0.7–3.1)
LYMPHOCYTES # BLD MANUAL: 2.56 10*3/MM3 (ref 0.7–3.1)
LYMPHOCYTES NFR BLD AUTO: 20 % (ref 19.6–45.3)
LYMPHOCYTES NFR BLD MANUAL: 8 % (ref 5–12)
MCH RBC QN AUTO: 27.6 PG (ref 26.6–33)
MCHC RBC AUTO-ENTMCNC: 32.9 G/DL (ref 31.5–35.7)
MCV RBC AUTO: 83.9 FL (ref 79–97)
METAMYELOCYTES NFR BLD MANUAL: 7 % (ref 0–0)
MONOCYTES # BLD AUTO: 1.04 10*3/MM3 (ref 0.1–0.9)
MONOCYTES # BLD: 1.08 10*3/MM3 (ref 0.1–0.9)
MONOCYTES NFR BLD AUTO: 7.7 % (ref 5–12)
NEUTROPHILS # BLD AUTO: 8.09 10*3/MM3 (ref 1.7–7)
NEUTROPHILS NFR BLD AUTO: 59.6 % (ref 42.7–76)
NEUTROPHILS NFR BLD AUTO: 8.04 10*3/MM3 (ref 1.7–7)
NEUTROPHILS NFR BLD MANUAL: 57 % (ref 42.7–76)
NEUTS BAND NFR BLD MANUAL: 3 % (ref 0–5)
NRBC BLD AUTO-RTO: 0 /100 WBC (ref 0–0.2)
OVALOCYTES BLD QL SMEAR: ABNORMAL
PLATELET # BLD AUTO: 461 10*3/MM3 (ref 140–450)
PMV BLD AUTO: 8.6 FL (ref 6–12)
RBC # BLD AUTO: 3.41 10*6/MM3 (ref 3.77–5.28)
SMALL PLATELETS BLD QL SMEAR: ADEQUATE
VARIANT LYMPHS NFR BLD MANUAL: 19 % (ref 19.6–45.3)
WBC MORPH BLD: NORMAL
WBC NRBC COR # BLD: 13.48 10*3/MM3 (ref 3.4–10.8)

## 2023-09-26 PROCEDURE — 97116 GAIT TRAINING THERAPY: CPT

## 2023-09-26 PROCEDURE — 85025 COMPLETE CBC W/AUTO DIFF WBC: CPT

## 2023-09-26 PROCEDURE — 97535 SELF CARE MNGMENT TRAINING: CPT

## 2023-09-26 PROCEDURE — 97530 THERAPEUTIC ACTIVITIES: CPT

## 2023-09-26 PROCEDURE — 85007 BL SMEAR W/DIFF WBC COUNT: CPT

## 2023-09-26 PROCEDURE — 25010000002 CEFTRIAXONE PER 250 MG

## 2023-09-26 RX ADMIN — METOPROLOL TARTRATE 100 MG: 100 TABLET, FILM COATED ORAL at 20:41

## 2023-09-26 RX ADMIN — AMLODIPINE BESYLATE 10 MG: 10 TABLET ORAL at 09:00

## 2023-09-26 RX ADMIN — APIXABAN 5 MG: 5 TABLET, FILM COATED ORAL at 20:41

## 2023-09-26 RX ADMIN — APIXABAN 5 MG: 5 TABLET, FILM COATED ORAL at 09:00

## 2023-09-26 RX ADMIN — DOCUSATE SODIUM 50 MG AND SENNOSIDES 8.6 MG 2 TABLET: 8.6; 5 TABLET, FILM COATED ORAL at 20:41

## 2023-09-26 RX ADMIN — METOPROLOL TARTRATE 100 MG: 100 TABLET, FILM COATED ORAL at 09:00

## 2023-09-26 RX ADMIN — Medication 10 ML: at 09:01

## 2023-09-26 RX ADMIN — Medication 10 ML: at 20:41

## 2023-09-26 RX ADMIN — CEFTRIAXONE SODIUM 1000 MG: 1 INJECTION, POWDER, FOR SOLUTION INTRAMUSCULAR; INTRAVENOUS at 12:18

## 2023-09-26 RX ADMIN — DOCUSATE SODIUM 50 MG AND SENNOSIDES 8.6 MG 2 TABLET: 8.6; 5 TABLET, FILM COATED ORAL at 09:00

## 2023-09-26 RX ADMIN — FAMOTIDINE 40 MG: 40 TABLET, FILM COATED ORAL at 08:59

## 2023-09-26 NOTE — PLAN OF CARE
Goal Outcome Evaluation:  Plan of Care Reviewed With: patient        Progress: improving  Outcome Evaluation: vss, patient up to bsc mulitple times this shift with assist x1 and GB, improvement each time up, denies pain, son at bedside, pt resting between care

## 2023-09-26 NOTE — THERAPY TREATMENT NOTE
Patient Name: Eloisa Chang  : 1940    MRN: [id number]                              Today's Date: 2023       Physical Therapy Treatment Note    Admit Date: 2023    Visit Dx:     ICD-10-CM ICD-9-CM   1. Traumatic rhabdomyolysis, initial encounter  T79.6XXA 958.6   2. Compression fracture of T12 vertebra, initial encounter  S22.080A 805.2   3. Impaired mobility and ADLs [Z74.09, Z78.9 (ICD-10-CM)]  Z74.09 V49.89    Z78.9    4. Impaired functional mobility, balance, gait, and endurance [Z74.09 (ICD-10-CM)]  Z74.09 V49.89     Patient Active Problem List   Diagnosis    Rhabdomyolysis     Past Medical History:   Diagnosis Date    Diabetes mellitus     Hypertension     Stroke      Past Surgical History:   Procedure Laterality Date    HYSTERECTOMY        General Information       Row Name 23 1201          Physical Therapy Time and Intention    Document Type therapy note (daily note)  -     Mode of Treatment individual therapy;physical therapy  -       Row Name 23 1201          General Information    Patient Profile Reviewed yes  -     Existing Precautions/Restrictions fall;brace worn when out of bed;spinal  -       Row Name 23 1201          Cognition    Orientation Status (Cognition) oriented x 4  -       Row Name 23 1201          Safety Issues, Functional Mobility    Impairments Affecting Function (Mobility) balance;coordination;endurance/activity tolerance;strength;pain  -               User Key  (r) = Recorded By, (t) = Taken By, (c) = Cosigned By      Initials Name Provider Type     Regine Pyle PTA Physical Therapist Assistant                   Mobility       Row Name 23 1308          Bed Mobility    Bed Mobility supine-sit;rolling left  -     Rolling Left [place] (Bed Mobility) verbal cues;nonverbal cues (demo/gesture);supervision  -     Supine-Sit [place] (Bed Mobility) verbal cues;nonverbal cues (demo/gesture);minimum assist (75% patient  effort)  -     Comment, (Bed Mobility) bed flat and no bed rails. pt's daughter in law participated in transfer in order to be able to assist patient at home  -       Row Name 09/26/23 1308          Bed-Chair Transfer    Bed-Chair [place] (Transfers) verbal cues;nonverbal cues (demo/gesture);minimum assist (75% patient effort)  -     Assistive Device (Bed-Chair Transfers) walker, rolling platform  -       Row Name 09/26/23 1308          Sit-Stand Transfer    Sit-Stand [place] (Transfers) minimum assist (75% patient effort);1 person assist  -     Assistive Device (Sit-Stand Transfers) walker, rolling platform  -     Comment, (Sit-Stand Transfer) pt's daugther in law present and with instruction on how to assist  -       Row Name 09/26/23 1308          Gait/Stairs (Locomotion)    [place] Level (Gait) minimum assist (75% patient effort);1 person assist  -     Assistive Device (Gait) walker, rolling platform  -     Distance in Feet (Gait) 5 feet + 5 feet + 130 feet  -     Deviations/Abnormal Patterns (Gait) antalgic;jamil decreased;gait speed decreased;stride length decreased;weight shifting decreased  -     Bilateral Gait Deviations forward flexed posture;heel strike decreased  -     [place] Level (Stairs) minimum assist (75% patient effort);1 person assist  -     Handrail Location (Stairs) right side (ascending);left side (descending);right side (descending)  -     Number of Steps (Stairs) up 3 steps/down 2 steps right side ascending/right side descending + up 2 steps/down 3 steps right side ascending/left side descending x 2 trials each  -     Ascending Technique (Stairs) step-to-step  -     Descending Technique (Stairs) step-to-step  -     Comment, (Gait/Stairs) patient's daughter in law is present to learn how to assist and participate in assist for gait and stairs today  -               User Key  (r) = Recorded By, (t) = Taken By, (c) = Cosigned By       Initials Name Provider Type     Regine Pyle, PTA Physical Therapist Assistant                   Obj/Interventions    No documentation.                  Goals/Plan       Row Name 09/26/23 1329          Bed Mobility Goal 1 (PT)    Activity/Assistive Device (Bed Mobility Goal 1, PT) bed mobility activities, all  -MH     [place] Level/Cues Needed (Bed Mobility Goal 1, PT) modified independence  -MH     Time Frame (Bed Mobility Goal 1, PT) by discharge  -MH     Progress/Outcomes (Bed Mobility Goal 1, PT) goal not met  -MH       Row Name 09/26/23 1329          Transfer Goal 1 (PT)    Activity/Assistive Device (Transfer Goal 1, PT) sit-to-stand/stand-to-sit;bed-to-chair/chair-to-bed;walker, rolling  -     [place] Level/Cues Needed (Transfer Goal 1, PT) modified independence  -MH     Time Frame (Transfer Goal 1, PT) by discharge  -MH     Strategies/Barriers (Transfers Goal 1, PT) TLSO.  -MH     Progress/Outcome (Transfer Goal 1, PT) goal not met;goal revised this date  -       Row Name 09/26/23 1329          Gait Training Goal 1 (PT)    Activity/Assistive Device (Gait Training Goal 1, PT) walker, rolling  -MH     [place] Level (Gait Training Goal 1, PT) modified independence  -MH     Distance (Gait Training Goal 1, PT) 80' x 1.  -MH     Time Frame (Gait Training Goal 1, PT) by discharge  -MH     Strategies/Barriers (Gait Training Goal 1, PT) TLSO.  -MH     Progress/Outcome (Gait Training Goal 1, PT) goal not met;goal revised this date  -       Row Name 09/26/23 1329          Stairs Goal 1 (PT)    Activity/Assistive Device (Stairs Goal 1, PT) using handrail, right  -MH     [place] Level/Cues Needed (Stairs Goal 1, PT) modified independence  -MH     Number of Stairs (Stairs Goal 1, PT) 3  -MH     Time Frame (Stairs Goal 1, PT) by discharge  -MH     Strategies/Barriers (Stairs Goal 1, PT) Patient' Daughter-in-law states she will work on getting hand rail installed.  -MH     Progress/Outcome  (Stairs Goal 1, PT) goal not met;goal revised this date  -               User Key  (r) = Recorded By, (t) = Taken By, (c) = Cosigned By      Initials Name Provider Type     Regine Pyle PTA Physical Therapist Assistant                   Clinical Impression       Row Name 09/26/23 5621          Pain    Pretreatment Pain Rating 0/10 - no pain  -     Posttreatment Pain Rating 0/10 - no pain  -       Row Name 09/26/23 3216          Plan of Care Review    Plan of Care Reviewed With patient;daughter  -     Progress improving  -     Outcome Evaluation patient is supine in bed resting upon entry. agreeable to treatment today. is apprehensive with the thought of gait training and stair training today. daughter in law present to be taught handling skills for patient to assist in transfers and gait as well as stair training. patient is able to complete rolling to the left with verbal and non verbal cues with Supervision with bed flat and no bedrails. she is able to complete supine to sit with log roll technique with verbal and non verbal cueing with min assist with bed flat and no bedrails. upon sitting, she is able to maintain her seated balance. patient completes sit to stand with platform rolling walker and min assist with verbal and non verbal cueing. she completes bed to chair transfer with min assist wtih platform rolling walker with vebral and non verbal cueing. patient is able to ambulate short distance in her room with platorm rolling walker with min A to CGA with verbal and non verbal cueing. decreased assist is needed as patient takes more steps and becomes more confident. she is pushed in a wheelchair to stairs and daughter in law is educated on the appropriate way to assist with ascending and descending stairs. patient requires verbal and non verbal cueing to ascend and descend 2-3 stairs with a single handrail and min assist. she then ambulates back to her room 130 feet with platform rolling walker  and min assist with verbal and non verbal cueing. cues with gait are needed for appropriate hand placement, appropriate walker placement and steering walker. cues are needed to decrease neglect of patients awareness of her left arm throughout treatment. patient will need a platform rolling walker to return home. patient's daughter in law shows the ability to assist patient in gait and transfers as well as bed mobility once they return home. ideally patient would benefit from rehab to home, but patient and daughter in law verbalize that they do not want pt to be placed in a facility for rehab to home so patient would benefit from home health PT services to continue to improve functional mobility and strength as well as assess for safety concerns in the home. post treatment patient is positioned seated EOB with daughter in law for her lunch tray. all needs met and in reach.  -       Row Name 09/26/23 1315          Therapy Assessment/Plan (PT)    Rehab Potential (PT) fair, will monitor progress closely  -     Criteria for Skilled Interventions Met (PT) yes  -     Therapy Frequency (PT) daily  -       Row Name 09/26/23 1315          Vital Signs    Pre Patient Position Supine  -     Post Patient Position Sitting  -       Row Name 09/26/23 1315          Positioning and Restraints    Pre-Treatment Position in bed  -     In Bed sitting EOB;call light within reach;encouraged to call for assist;with family/caregiver  -               User Key  (r) = Recorded By, (t) = Taken By, (c) = Cosigned By      Initials Name Provider Type     Regine Pyle, PTA Physical Therapist Assistant                   Outcome Measures       Row Name 09/26/23 1330 09/26/23 0859       How much help from another person do you currently need...    Turning from your back to your side while in flat bed without using bedrails? 3  - 3  -JH    Moving from lying on back to sitting on the side of a flat bed without bedrails? 3  - 3  -JH     Moving to and from a bed to a chair (including a wheelchair)? 3  - 3  -    Standing up from a chair using your arms (e.g., wheelchair, bedside chair)? 3  - 3  -    Climbing 3-5 steps with a railing? 3  - 2  -    To walk in hospital room? 3  - 3  -    AM-PAC 6 Clicks Score (PT) 18  - 17  -    Highest level of mobility 6 --> Walked 10 steps or more  - 5 --> Static standing  -              User Key  (r) = Recorded By, (t) = Taken By, (c) = Cosigned By      Initials Name Provider Type     Regine Pyle PTA Physical Therapist Assistant     Michelle Schwab LPN Licensed Nurse                                 Physical Therapy Education       Title: PT OT SLP Therapies (In Progress)       Topic: Physical Therapy (In Progress)       Point: Mobility training (Done)       Learning Progress Summary             Patient Acceptance, E,TB,D, VU,DU by  at 9/26/2023 1330    Comment: patient's daughter in law is educated on safe handling skills for pt bed mobility, transfers and gait as well as stair training.    Acceptance, E, VU by RE at 9/25/2023 1218    Comment: PT POC, hand placement from transfers, hand placement on AD, AD management, importance of hand rail for steps.   Family Acceptance, E,TB,D, VU,DU by  at 9/26/2023 1330    Comment: patient's daughter in law is educated on safe handling skills for pt bed mobility, transfers and gait as well as stair training.    Acceptance, E, VU by RE at 9/25/2023 1218    Comment: PT POC, hand placement from transfers, hand placement on AD, AD management, importance of hand rail for steps.                         Point: Home exercise program (Not Started)       Learner Progress:  Not documented in this visit.              Point: Body mechanics (Not Started)       Learner Progress:  Not documented in this visit.              Point: Precautions (Done)       Learning Progress Summary             Patient Acceptance, E,TB, VU by  at 9/25/2023 3936                                          User Key       Initials Effective Dates Name Provider Type Discipline     02/03/23 -  Regine Pyle PTA Physical Therapist Assistant PT    KD 05/31/23 -  Courtney De León, RN Registered Nurse Nurse    RE 08/16/23 -  Bud Chirinos PT Student PT Student PT                  PT Recommendation and Plan     Plan of Care Reviewed With: patient, daughter  Progress: improving  Outcome Evaluation: patient is supine in bed resting upon entry. agreeable to treatment today. is apprehensive with the thought of gait training and stair training today. daughter in law present to be taught handling skills for patient to assist in transfers and gait as well as stair training. patient is able to complete rolling to the left with verbal and non verbal cues with Supervision with bed flat and no bedrails. she is able to complete supine to sit with log roll technique with verbal and non verbal cueing with min assist with bed flat and no bedrails. upon sitting, she is able to maintain her seated balance. patient completes sit to stand with platform rolling walker and min assist with verbal and non verbal cueing. she completes bed to chair transfer with min assist wtih platform rolling walker with vebral and non verbal cueing. patient is able to ambulate short distance in her room with platorm rolling walker with min A to CGA with verbal and non verbal cueing. decreased assist is needed as patient takes more steps and becomes more confident. she is pushed in a wheelchair to stairs and daughter in law is educated on the appropriate way to assist with ascending and descending stairs. patient requires verbal and non verbal cueing to ascend and descend 2-3 stairs with a single handrail and min assist. she then ambulates back to her room 130 feet with platform rolling walker and min assist with verbal and non verbal cueing. cues with gait are needed for appropriate hand placement, appropriate walker placement and  steering walker. cues are needed to decrease neglect of patients awareness of her left arm throughout treatment. patient will need a platform rolling walker to return home. patient's daughter in law shows the ability to assist patient in gait and transfers as well as bed mobility once they return home. ideally patient would benefit from rehab to home, but patient and daughter in law verbalize that they do not want pt to be placed in a facility for rehab to home so patient would benefit from home health PT services to continue to improve functional mobility and strength as well as assess for safety concerns in the home. post treatment patient is positioned seated EOB with daughter in law for her lunch tray. all needs met and in reach.     Time Calculation:         PT Charges       Row Name 09/26/23 1202             Time Calculation    Start Time 1155  -      Stop Time 1300  -      Time Calculation (min) 65 min  -      PT Received On 09/26/23  -         Time Calculation- PT    Total Timed Code Minutes- PT 65 minute(s)  -MH         Timed Charges    82966 - Gait Training Minutes  30  -      76640 - PT Therapeutic Activity Minutes 20  -      34708 - PT Self Care/Mgmt Minutes 15  -MH         Total Minutes    Timed Charges Total Minutes 65  -MH       Total Minutes 65  -MH                User Key  (r) = Recorded By, (t) = Taken By, (c) = Cosigned By      Initials Name Provider Type     Regine Pyle PTA Physical Therapist Assistant                  Therapy Charges for Today       Code Description Service Date Service Provider Modifiers Qty    82313629473 HC GAIT TRAINING EA 15 MIN 9/26/2023 Regine Pyle PTA GP 2    30031919640 HC PT THERAPEUTIC ACT EA 15 MIN 9/26/2023 Regine Pyle PTA GP 1    40323010677 HC PT SELF CARE/MGMT/TRAIN EA 15 MIN 9/26/2023 Regine Pyle PTA GP 1            PT G-Codes  Outcome Measure Options: AM-PAC 6 Clicks Basic Mobility (PT)  AM-PAC 6 Clicks Score (PT): 18  AM-PAC 6  Clicks Score (OT): 16  Modified Elizabeth Scale: 4 - Moderately severe disability.  Unable to walk without assistance, and unable to attend to own bodily needs without assistance.  PT Discharge Summary  Anticipated Discharge Disposition (PT): other (see comments), skilled nursing facility, home with 24/7 care, home with home health (rehab to home)    Regine Pyle, PTA  9/26/2023

## 2023-09-26 NOTE — DISCHARGE PLACEMENT REQUEST
"Eloisa Hernandez (83 y.o. Female)       Date of Birth   1940    Social Security Number       Address   [address] [place] [address]    Home Phone   [phone]    MRN   [id number]       Hindu   Non-Yarsani    Marital Status                               Admission Date   9/21/23    Admission Type   Emergency    Admitting Provider   Alex Salcedo MD    Attending Provider   Alex Salcedo MD    Department, Room/Bed   [address], 434/1       Discharge Date       Discharge Disposition   Home-Health Care [facility]    Discharge Destination                                 Attending Provider: Alex Salcedo MD    Allergies: No Known Allergies    Isolation: None   Infection: None   Code Status: CPR    Ht: 170.2 cm (67.01\")   Wt: 63 kg (138 lb 14.4 oz)    Admission Cmt: None   Principal Problem: Rhabdomyolysis [M62.82]                   Active Insurance as of 9/21/2023       Primary Coverage       Payor Plan Insurance Group Employer/Plan Group    MEDICARE MEDICARE A & B        Payor Plan Address Payor Plan Phone Number Payor Plan Fax Number Effective Dates     [address] [phone]  4/1/2006 - None Entered    [facility] [id number]         Subscriber Name Subscriber Birth Date Member ID       ELOISA HERNANDEZ 1940 [id number]               Secondary Coverage       Payor Plan Insurance Group Employer/Plan Group    [employer] [place] [employer] [place]        Payor Plan Address Payor Plan Phone Number Payor Plan Fax Number Effective Dates    [address] [employer] [place] [address] [phone]  1/1/2011 - None Entered    [facility] [id number]         Subscriber Name Subscriber Birth Date Member ID       ELOISA HERNANDEZ 1940 [id number]                     Emergency Contacts        (Rel.) Home Phone Work Phone Mobile Phone    DALLAS HERNANDEZ (Son) [phone] -- [phone]    MARYGILMA (Son) [phone] -- [phone]              [facility] " "[address] [address]  Dept. Phone:  [phone]  Dept. Fax:   Date Ordered: Sep 26, 2023         Patient:  Eloisa Chang MRN:  [id number]   [facility] [address] :  1940  SSN:    Phone: [phone] Sex:  F     Weight: 63 kg (138 lb 14.4 oz)         Ht Readings from Last 1 Encounters:   23 170.2 cm (67.01\")         Miscellaneous DME   (Order ID: [id number])    Diagnosis:  Traumatic rhabdomyolysis, initial encounter (T79.6XXA [ICD-10-CM] 958.6 [ICD-9-CM])   Quantity:  1     Type of DME: Left platform attachment for rolling walker  Length of Need (99 Months = Lifetime): 99 Months = Lifetime        Authorizing Provider's Phone: [phone]  Verbal Order Mode: Telephone with readback   Authorizing Provider: Gabriele Franco MD  Authorizing Provider's NPI: [id number]     Order Entered By: Magda Bishop RN 2023  3:39 PM     Electronically signed by:     Regine Pyle PTA   Physical Therapist Assistant  Physical Therapy     Plan of Care      Signed     Date of Service: 23  Creation Time: 23     Signed         Goal Outcome Evaluation:  Plan of Care Reviewed With: patient, daughter  Progress: improving  Outcome Evaluation: patient is supine in bed resting upon entry. agreeable to treatment today. is apprehensive with the thought of gait training and stair training today. daughter in law present to be taught handling skills for patient to assist in transfers and gait as well as stair training. patient is able to complete rolling to the left with verbal and non verbal cues with Supervision with bed flat and no bedrails. she is able to complete supine to sit with log roll technique with verbal and non verbal cueing with min assist with bed flat and no bedrails. upon sitting, she is able to maintain her seated balance. patient completes sit to stand with platform rolling " walker and min assist with verbal and non verbal cueing. she completes bed to chair transfer with min assist wtih platform rolling walker with vebral and non verbal cueing. patient is able to ambulate short distance in her room with platorm rolling walker with min A to CGA with verbal and non verbal cueing. decreased assist is needed as patient takes more steps and becomes more confident. she is pushed in a wheelchair to stairs and daughter in law is educated on the appropriate way to assist with ascending and descending stairs. patient requires verbal and non verbal cueing to ascend and descend 2-3 stairs with a single handrail and min assist. she then ambulates back to her room 130 feet with platform rolling walker and min assist with verbal and non verbal cueing. cues with gait are needed for appropriate hand placement, appropriate walker placement and steering walker. cues are needed to decrease neglect of patients awareness of her left arm throughout treatment. patient will need a platform rolling walker to return home. patient's daughter in law shows the ability to assist patient in gait and transfers as well as bed mobility once they return home. ideally patient would benefit from rehab to home, but patient and daughter in law verbalize that they do not want pt to be placed in a facility for rehab to home so patient would benefit from home health PT services to continue to improve functional mobility and strength as well as assess for safety concerns in the home. post treatment patient is positioned seated EOB with daughter in law for her lunch tray. all needs met and in reach.        Anticipated Discharge Disposition (PT): other (see comments), skilled nursing facility, home with 24/7 care, home with home health (rehab to home)

## 2023-09-26 NOTE — PLAN OF CARE
Goal Outcome Evaluation:  Plan of Care Reviewed With: patient, daughter        Progress: improving  Outcome Evaluation: patient is supine in bed resting upon entry. agreeable to treatment today. is apprehensive with the thought of gait training and stair training today. daughter in law present to be taught handling skills for patient to assist in transfers and gait as well as stair training. patient is able to complete rolling to the left with verbal and non verbal cues with Supervision with bed flat and no bedrails. she is able to complete supine to sit with log roll technique with verbal and non verbal cueing with min assist with bed flat and no bedrails. upon sitting, she is able to maintain her seated balance. patient completes sit to stand with platform rolling walker and min assist with verbal and non verbal cueing. she completes bed to chair transfer with min assist wtih platform rolling walker with vebral and non verbal cueing. patient is able to ambulate short distance in her room with platorm rolling walker with min A to CGA with verbal and non verbal cueing. decreased assist is needed as patient takes more steps and becomes more confident. she is pushed in a wheelchair to stairs and daughter in law is educated on the appropriate way to assist with ascending and descending stairs. patient requires verbal and non verbal cueing to ascend and descend 2-3 stairs with a single handrail and min assist. she then ambulates back to her room 130 feet with platform rolling walker and min assist with verbal and non verbal cueing. cues with gait are needed for appropriate hand placement, appropriate walker placement and steering walker. cues are needed to decrease neglect of patients awareness of her left arm throughout treatment. patient will need a platform rolling walker to return home. patient's daughter in law shows the ability to assist patient in gait and transfers as well as bed mobility once they return home.  ideally patient would benefit from rehab to home, but patient and daughter in law verbalize that they do not want pt to be placed in a facility for rehab to home so patient would benefit from home health PT services to continue to improve functional mobility and strength as well as assess for safety concerns in the home. post treatment patient is positioned seated EOB with daughter in law for her lunch tray. all needs met and in reach.      Anticipated Discharge Disposition (PT): other (see comments), skilled nursing facility, home with 24/7 care, home with home health (rehab to home)

## 2023-09-26 NOTE — PLAN OF CARE
Goal Outcome Evaluation:  Plan of Care Reviewed With: patient        Progress: improving  Outcome Evaluation: Pt resting in bed with family at bedside, no falls noted, no new skin injuries noted, right heel pink and blanchable, continues with IV atbs with no adse noted

## 2023-09-26 NOTE — PROGRESS NOTES
[facility] Medicine Services  INPATIENT PROGRESS NOTE    Length of Stay: 3  Date of Admission: 9/21/2023  Primary Care Physician: Kush Newman MD    Subjective   Chief Complaint: none  HPI:  83-year-old male comes to the ER from home after being found on the floor of her bathroom. Spoke to her sons Bruce and Bao, they found her on the bathroom floor, maybe after 36 hours. Today son says she complained of numbness left forearm 3 days ago.  Patient does not remember what happened, now more oriented and denies chest pain, no shortness of breath, no fever, no urinary or GI symptoms. Family thinks she was on the ground for 2 nights. She is complaining of left hip pain. PMH: Type 2 diabetes, hyperlipidemia, essential hypertension, osteoporosis. Today alert and more oriented with his son at bedside       As of today, 09/26/23  Review of Systems   Constitutional:  Negative for activity change, appetite change, chills, diaphoresis and fatigue.   HENT:  Negative for congestion, ear discharge, hearing loss, rhinorrhea, sinus pain, sneezing and sore throat.    Eyes:  Negative for photophobia, discharge and visual disturbance.   Respiratory:  Negative for cough, chest tightness, shortness of breath and wheezing.    Cardiovascular:  Negative for chest pain and palpitations.   Gastrointestinal:  Negative for abdominal distention, abdominal pain, blood in stool, diarrhea, nausea and vomiting.   Endocrine: Negative for cold intolerance, heat intolerance, polydipsia, polyphagia and polyuria.   Genitourinary:  Negative for dysuria, flank pain, hematuria and urgency.   Musculoskeletal:  Negative for arthralgias, joint swelling and myalgias.   Skin:  Negative for color change.   Allergic/Immunologic: Negative for food allergies.   Neurological:  Negative for dizziness, seizures, syncope, speech difficulty, weakness and headaches.   Hematological:  Negative for adenopathy. Does  not bruise/bleed easily.   Psychiatric/Behavioral:  Negative for confusion, hallucinations, self-injury and suicidal ideas. The patient is not nervous/anxious.       All pertinent negatives and positives are as above. All other systems have been reviewed and are negative unless otherwise stated.    Objective    Temp:  [98.6 °F (37 °C)-99.3 °F (37.4 °C)] 99.3 °F (37.4 °C)  Heart Rate:  [57-66] 61  Resp:  [18] 18  BP: (125-139)/(60-65) 137/65    AM-PAC 6 Clicks Score (PT): 18 (09/26/23 1330)    As of today, 09/26/23  Physical Exam  Constitutional:       Appearance: Normal appearance.   HENT:      Head: Normocephalic and atraumatic.      Right Ear: Tympanic membrane normal.      Left Ear: Tympanic membrane normal.      Nose: Nose normal.      Mouth/Throat:      Mouth: Mucous membranes are moist.   Eyes:      Pupils: Pupils are equal, round, and reactive to light.   Cardiovascular:      Rate and Rhythm: Normal rate and regular rhythm.      Pulses: Normal pulses.      Heart sounds: Normal heart sounds.   Pulmonary:      Effort: Pulmonary effort is normal.      Breath sounds: Normal breath sounds.   Abdominal:      General: Abdomen is flat. Bowel sounds are normal.      Palpations: Abdomen is soft.   Musculoskeletal:         General: Normal range of motion.      Cervical back: Normal range of motion and neck supple.      Comments: Bruised forehead and knees (old)   Skin:     General: Skin is warm and dry.      Capillary Refill: Capillary refill takes less than 2 seconds.   Neurological:      General: No focal deficit present.      Mental Status: She is alert and oriented to person, place, and time.   Psychiatric:         Mood and Affect: Mood normal.         Behavior: Behavior normal.         Thought Content: Thought content normal.         Judgment: Judgment normal.         Results Review:  I have reviewed the labs, radiology results, and diagnostic studies.    Laboratory Data:   Results from last 7 days   Lab Units  09/25/23  0623 09/24/23  1457 09/24/23  0458 09/23/23  0418 09/22/23  0534 09/21/23  1421   SODIUM mmol/L 134*  --  137 137 139 140   POTASSIUM mmol/L 4.0 4.6 3.4* 4.3 4.2  4.2 3.4*   CHLORIDE mmol/L 106  --  105 106 108* 104   CO2 mmol/L 20.0*  --  22.0 22.0 21.0* 21.0*   BUN mg/dL 15  --  14 17 23 31*   CREATININE mg/dL 0.74  --  0.70 0.82 0.80 1.33*   GLUCOSE mg/dL 104*  --  134* 145* 154* 122*   CALCIUM mg/dL 8.0*  --  8.1* 8.3* 8.0* 8.3*   BILIRUBIN mg/dL  --   --   --   --  0.4 0.6   ALK PHOS U/L  --   --   --   --  64 65   ALT (SGPT) U/L  --   --   --   --  38* 41*   AST (SGOT) U/L  --   --   --   --  51* 72*   ANION GAP mmol/L 8.0  --  10.0 9.0 10.0 15.0     Estimated Creatinine Clearance: 58.3 mL/min (by C-G formula based on SCr of 0.74 mg/dL).  Results from last 7 days   Lab Units 09/21/23  1421   MAGNESIUM mg/dL 1.9         Results from last 7 days   Lab Units 09/26/23  0613 09/23/23  0418 09/22/23  0534 09/21/23  1421   WBC 10*3/mm3 13.48* 14.03* 11.96* 15.49*   HEMOGLOBIN g/dL 9.4* 10.1* 10.2* 10.7*   HEMATOCRIT % 28.6* 30.6* 30.5* 31.0*   PLATELETS 10*3/mm3 461* 531* 452* 419     Results from last 7 days   Lab Units 09/22/23  0534   INR  1.26*       Culture Data:   No results found for: BLOODCX  No results found for: URINECX  No results found for: RESPCX  No results found for: WOUNDCX  No results found for: STOOLCX  No components found for: BODYFLD    Radiology Data:   Imaging Results (Last 24 Hours)       ** No results found for the last 24 hours. **            I have utilized all available immediate resources to obtain, update, or review the patient's current medications (including all prescriptions, over-the-counter products, herbals, cannabis/cannabidiol products, and vitamin/mineral/dietary (nutritional) supplements).       Assessment/Plan     Active Hospital Problems    Diagnosis     **Rhabdomyolysis          Assessment and Plan: 83 years old female patient found on the floor after 2 days,  probable cardiogenic syncope, afib RVR. Had renal failure, rhabdomyolysis. Hypokalemia. Leucocytosis from UTI.  Now on full anticoagulation and rate control off  cardizem drip, on telemetry. MRI: Scattered areas of acute infarct predominantly within the right frontal, parietal, and occipital lobes with a single small subcentimeter focus in the superior left frontal lobe.  Problems:  1-Atrial fibrillation with RVR, Cardizem drip was stopped. Probable Afib RVR at home that caused syncope. Lopressor titrated by cardiology. Discussed with Bruce (son)  about discharge planning tomorrow PT OT evaluation appreciated  2-Rhabdomyolysis, resolved  3-Renal failure, prerenal. Resolved  4-UTI. High WBC, to follow, urine and blood cultures in process, abnormal urine analysis. UTI. Urine culture with contamination. Started Rocephin.  5-Hypokalemia, electrolyte replacement protocol. resolved  6-Old T12 and right ribs fractures, underlined osteoporosis  7-Frequent falls, PT OT, X rays knees only degenerative changes  8-HFpEF, left atrium enlargement, tricuspid regurgitation, group 2 pulmonary hypertension, cardiology on board. On betablocker and CCB  9-osteoarthritis of both knees and hands    Medical Decision Making  Number and Complexity of problems: 5  Differential Diagnosis: atrial fibrillation RVR    Conditions and Status:        Condition is improving.     [facility] Data  External documents reviewed: previous medical records  My EKG interpretation: atrial fibrillation  My CT interpretation: Chest ribs cage fractures, T12 compression fracture   Tests considered but not ordered: none     Decision rules/scores evaluated (example [id number], [place], etc): 7     Discussed with: patient, daughter in law     Treatment Plan  DC tomorrow    Care Planning  Shared decision making: Tej Barrera  Code status and discussions: full code    Disposition  Social Determinants of Health that impact treatment or disposition: none  I expect the patient  to be discharged to home in 1 days.       I confirmed that the patient's Advance Care Plan is present, code status is documented, or surrogate decision maker is listed in the patient's medical record.      This document has been electronically signed by Gabriele Franco MD on September 26, 2023 18:03 CDT

## 2023-09-27 VITALS
BODY MASS INDEX: 22.13 KG/M2 | HEIGHT: 67 IN | SYSTOLIC BLOOD PRESSURE: 149 MMHG | DIASTOLIC BLOOD PRESSURE: 65 MMHG | TEMPERATURE: 98.2 F | OXYGEN SATURATION: 98 % | HEART RATE: 64 BPM | WEIGHT: 141 LBS | RESPIRATION RATE: 18 BRPM

## 2023-09-27 PROBLEM — A49.9 UTI (URINARY TRACT INFECTION), BACTERIAL: Status: ACTIVE | Noted: 2023-09-27

## 2023-09-27 PROBLEM — N39.0 UTI (URINARY TRACT INFECTION), BACTERIAL: Status: ACTIVE | Noted: 2023-09-27

## 2023-09-27 LAB
ALBUMIN SERPL-MCNC: 2.8 G/DL (ref 3.5–5.2)
ALBUMIN/GLOB SERPL: 0.8 G/DL
ALP SERPL-CCNC: 59 U/L (ref 39–117)
ALT SERPL W P-5'-P-CCNC: 30 U/L (ref 1–33)
ANION GAP SERPL CALCULATED.3IONS-SCNC: 10 MMOL/L (ref 5–15)
AST SERPL-CCNC: 21 U/L (ref 1–32)
BASOPHILS # BLD AUTO: 0.12 10*3/MM3 (ref 0–0.2)
BASOPHILS NFR BLD AUTO: 0.9 % (ref 0–1.5)
BILIRUB SERPL-MCNC: 0.3 MG/DL (ref 0–1.2)
BUN SERPL-MCNC: 16 MG/DL (ref 8–23)
BUN/CREAT SERPL: 20.5 (ref 7–25)
CALCIUM SPEC-SCNC: 8.6 MG/DL (ref 8.6–10.5)
CHLORIDE SERPL-SCNC: 104 MMOL/L (ref 98–107)
CO2 SERPL-SCNC: 21 MMOL/L (ref 22–29)
CREAT SERPL-MCNC: 0.78 MG/DL (ref 0.57–1)
DEPRECATED RDW RBC AUTO: 44 FL (ref 37–54)
EGFRCR SERPLBLD CKD-EPI 2021: 75.9 ML/MIN/1.73
EOSINOPHIL # BLD AUTO: 0.68 10*3/MM3 (ref 0–0.4)
EOSINOPHIL NFR BLD AUTO: 5.3 % (ref 0.3–6.2)
ERYTHROCYTE [DISTWIDTH] IN BLOOD BY AUTOMATED COUNT: 14.6 % (ref 12.3–15.4)
GLOBULIN UR ELPH-MCNC: 3.3 GM/DL
GLUCOSE SERPL-MCNC: 111 MG/DL (ref 65–99)
HCT VFR BLD AUTO: 28.5 % (ref 34–46.6)
HGB BLD-MCNC: 9.4 G/DL (ref 12–15.9)
IMM GRANULOCYTES # BLD AUTO: 0.71 10*3/MM3 (ref 0–0.05)
IMM GRANULOCYTES NFR BLD AUTO: 5.5 % (ref 0–0.5)
LYMPHOCYTES # BLD AUTO: 2.32 10*3/MM3 (ref 0.7–3.1)
LYMPHOCYTES NFR BLD AUTO: 18 % (ref 19.6–45.3)
MCH RBC QN AUTO: 27.7 PG (ref 26.6–33)
MCHC RBC AUTO-ENTMCNC: 33 G/DL (ref 31.5–35.7)
MCV RBC AUTO: 84.1 FL (ref 79–97)
MONOCYTES # BLD AUTO: 1.17 10*3/MM3 (ref 0.1–0.9)
MONOCYTES NFR BLD AUTO: 9.1 % (ref 5–12)
NEUTROPHILS NFR BLD AUTO: 61.2 % (ref 42.7–76)
NEUTROPHILS NFR BLD AUTO: 7.9 10*3/MM3 (ref 1.7–7)
NRBC BLD AUTO-RTO: 0 /100 WBC (ref 0–0.2)
PLATELET # BLD AUTO: 422 10*3/MM3 (ref 140–450)
PMV BLD AUTO: 8.4 FL (ref 6–12)
POTASSIUM SERPL-SCNC: 4 MMOL/L (ref 3.5–5.2)
PROT SERPL-MCNC: 6.1 G/DL (ref 6–8.5)
QT INTERVAL: 330 MS
QTC INTERVAL: 454 MS
RBC # BLD AUTO: 3.39 10*6/MM3 (ref 3.77–5.28)
SODIUM SERPL-SCNC: 135 MMOL/L (ref 136–145)
WBC NRBC COR # BLD: 12.9 10*3/MM3 (ref 3.4–10.8)

## 2023-09-27 PROCEDURE — 80053 COMPREHEN METABOLIC PANEL: CPT

## 2023-09-27 PROCEDURE — 97535 SELF CARE MNGMENT TRAINING: CPT

## 2023-09-27 PROCEDURE — 85025 COMPLETE CBC W/AUTO DIFF WBC: CPT

## 2023-09-27 PROCEDURE — 97110 THERAPEUTIC EXERCISES: CPT

## 2023-09-27 RX ADMIN — FAMOTIDINE 40 MG: 40 TABLET, FILM COATED ORAL at 09:09

## 2023-09-27 RX ADMIN — METOPROLOL TARTRATE 100 MG: 100 TABLET, FILM COATED ORAL at 09:09

## 2023-09-27 RX ADMIN — Medication 10 ML: at 09:10

## 2023-09-27 RX ADMIN — DOCUSATE SODIUM 50 MG AND SENNOSIDES 8.6 MG 2 TABLET: 8.6; 5 TABLET, FILM COATED ORAL at 09:09

## 2023-09-27 RX ADMIN — AMLODIPINE BESYLATE 10 MG: 10 TABLET ORAL at 09:08

## 2023-09-27 RX ADMIN — APIXABAN 5 MG: 5 TABLET, FILM COATED ORAL at 09:09

## 2023-09-27 NOTE — DISCHARGE INSTR - LAB
Please call JENNI Zapata for Neurology follow up at [phone].  Request for follow up sent, office may call.

## 2023-09-27 NOTE — THERAPY TREATMENT NOTE
Patient Name: Eloisa Chang  : 1940    MRN: [id number]                              Today's Date: 2023       Admit Date: 2023    Visit Dx:     ICD-10-CM ICD-9-CM   1. Traumatic rhabdomyolysis, initial encounter  T79.6XXA 958.6   2. Compression fracture of T12 vertebra, initial encounter  S22.080A 805.2   3. Impaired mobility and ADLs [Z74.09, Z78.9 (ICD-10-CM)]  Z74.09 V49.89    Z78.9    4. Impaired functional mobility, balance, gait, and endurance [Z74.09 (ICD-10-CM)]  Z74.09 V49.89     Patient Active Problem List   Diagnosis    Rhabdomyolysis    UTI (urinary tract infection), bacterial     Past Medical History:   Diagnosis Date    Diabetes mellitus     Hypertension     Stroke      Past Surgical History:   Procedure Laterality Date    HYSTERECTOMY        General Information       Row Name 23 0856          OT Time and Intention    Document Type therapy note (daily note)  -CS     Mode of Treatment occupational therapy  -CS       Row Name 23 0856          General Information    Patient Profile Reviewed yes  -CS     Existing Precautions/Restrictions fall;brace worn when out of bed;spinal  -CS       Row Name 2356          Cognition    Orientation Status (Cognition) oriented x 4  -CS       Row Name 2356          Safety Issues, Functional Mobility    Impairments Affecting Function (Mobility) balance;coordination;endurance/activity tolerance;strength;pain  -CS               User Key  (r) = Recorded By, (t) = Taken By, (c) = Cosigned By      Initials Name Provider Type    CS Mary Childs COTA Occupational Therapist Assistant                     Mobility/ADL's       Row Name 23 0856          Bed Mobility    Rolling Left [place] (Bed Mobility) verbal cues;nonverbal cues (demo/gesture);supervision  -CS     Supine-Sit [place] (Bed Mobility) verbal cues;nonverbal cues (demo/gesture);minimum assist (75% patient effort)  -CS       Row Name 23 0856           Transfers    Transfers sit-stand transfer;stand-sit transfer;toilet transfer  -CS       Row Name 09/27/23 0856          Sit-Stand Transfer    Sit-Stand [place] (Transfers) contact guard  -CS     Assistive Device (Sit-Stand Transfers) walker, rolling platform  -CS       Row Name 09/27/23 0856          Stand-Sit Transfer    Stand-Sit [place] (Transfers) contact guard  -CS     Assistive Device (Stand-Sit Transfers) walker, front-wheeled  -CS       Row Name 09/27/23 0856          Toilet Transfer    Type (Toilet Transfer) sit-stand;stand-sit  -CS     [place] Level (Toilet Transfer) contact guard  -CS     Assistive Device (Toilet Transfer) walker, rolling platform  -CS       Row Name 09/27/23 0856          Activities of Daily Living    BADL Assessment/Intervention grooming;toileting  -       Row Name 09/27/23 0856          Toileting Assessment/Training    [place] Level (Toileting) toileting skills;perform perineal hygiene;standby assist  -CS     Position (Toileting) unsupported sitting  -CS       Row Name 09/27/23 0856          Grooming Assessment/Training    [place] Level (Grooming) grooming skills;hair care, combing/brushing;wash face, hands;set up  -CS     Position (Grooming) edge of bed sitting  -CS               User Key  (r) = Recorded By, (t) = Taken By, (c) = Cosigned By      Initials Name Provider Type     Mary Childs COTA Occupational Therapist Assistant                   Obj/Interventions       Row Name 09/27/23 0856          Shoulder (Therapeutic Exercise)    Shoulder (Therapeutic Exercise) AROM (active range of motion);strengthening exercise  -     Shoulder AROM (Therapeutic Exercise) bilateral;flexion;extension;5 repetitions  -     Shoulder Strengthening (Therapeutic Exercise) resistance band;yellow;5 repetitions  -       Row Name 09/27/23 0856          Elbow/Forearm (Therapeutic Exercise)    Elbow/Forearm (Therapeutic Exercise) AROM (active range of motion)  -      Elbow/Forearm AROM (Therapeutic Exercise) bilateral;flexion;extension  -CS       Row Name 09/27/23 0856          Motor Skills    Therapeutic Exercise shoulder;elbow/forearm  -CS               User Key  (r) = Recorded By, (t) = Taken By, (c) = Cosigned By      Initials Name Provider Type    CS Mary Childs COTA Occupational Therapist Assistant                   Goals/Plan       Row Name 09/27/23 0856          Transfer Goal 1 (OT)    Activity/Assistive Device (Transfer Goal 1, OT) transfers, all  -CS     [place] Level/Cues Needed (Transfer Goal 1, OT) contact guard required  -CS     Time Frame (Transfer Goal 1, OT) long term goal (LTG);by discharge  -CS     Progress/Outcome (Transfer Goal 1, OT) goal not met;goal revised this date  -CS       Row Name 09/27/23 0856          Bathing Goal 1 (OT)    Activity/Device (Bathing Goal 1, OT) lower body bathing  -CS     [place] Level/Cues Needed (Bathing Goal 1, OT) standby assist  -CS     Time Frame (Bathing Goal 1, OT) long term goal (LTG);by discharge  -CS     Strategies/Barriers (Bathing Goal 1, OT) AD as needed  -CS     Progress/Outcomes (Bathing Goal 1, OT) goal not met;goal revised this date  -CS       Row Name 09/27/23 0856          Dressing Goal 1 (OT)    Activity/Device (Dressing Goal 1, OT) lower body dressing  -CS     [place]/Cues Needed (Dressing Goal 1, OT) standby assist  -CS     Time Frame (Dressing Goal 1, OT) long term goal (LTG);by discharge  -CS     Strategies/Barriers (Dressing Goal 1, OT) AD as needed  -CS     Progress/Outcome (Dressing Goal 1, OT) goal not met;goal revised this date  -       Row Name 09/27/23 0856          Toileting Goal 1 (OT)    Activity/Device (Toileting Goal 1, OT) toileting skills, all  -CS     [place] Level/Cues Needed (Toileting Goal 1, OT) standby assist  -CS     Time Frame (Toileting Goal 1, OT) long term goal (LTG);by discharge  -CS     Progress/Outcome (Toileting Goal 1, OT) goal not met  -CS        Row Name 09/27/23 0856          Problem Specific Goal 1 (OT)    Problem Specific Goal 1 (OT) Pt will perform OOB ax for 10 minutes with CGA to increase ax tolerance for ADLs.  -CS     Time Frame (Problem Specific Goal 1, OT) long term goal (LTG);by discharge  -CS     Progress/Outcome (Problem Specific Goal 1, OT) goal not met;new goal  -CS               User Key  (r) = Recorded By, (t) = Taken By, (c) = Cosigned By      Initials Name Provider Type    CS Mary Childs COTA Occupational Therapist Assistant                   Clinical Impression       Row Name 09/27/23 0856          Pain Assessment    Pretreatment Pain Rating 0/10 - no pain  -CS     Posttreatment Pain Rating 0/10 - no pain  -CS       Row Name 09/27/23 0856          Plan of Care Review    Plan of Care Reviewed With patient  -CS     Progress improving  -CS       Row Name 09/27/23 0856          Therapy Assessment/Plan (OT)    Rehab Potential (OT) fair, will monitor progress closely  -CS     Criteria for Skilled Therapeutic Interventions Met (OT) yes;skilled treatment is necessary  -CS       Row Name 09/27/23 0856          Therapy Plan Review/Discharge Plan (OT)    Anticipated Discharge Disposition (OT) home with 24/7 care;home with home health;home with outpatient therapy services  -CS       Row Name 09/27/23 0856          Vital Signs    Pre Patient Position Supine  -CS     Intra Patient Position Sitting  -CS     Post Patient Position Supine  -CS       Row Name 09/27/23 0856          Positioning and Restraints    Pre-Treatment Position in bed  -CS     Post Treatment Position bed  -CS     In Bed fowlers;call light within reach;encouraged to call for assist;exit alarm on  -CS               User Key  (r) = Recorded By, (t) = Taken By, (c) = Cosigned By      Initials Name Provider Type    CS Mary Childs COTA Occupational Therapist Assistant                   Outcome Measures       Row Name 09/27/23 0856          How much help from another is  currently needed...    Putting on and taking off regular lower body clothing? 2  -CS     Bathing (including washing, rinsing, and drying) 2  -CS     Toileting (which includes using toilet bed pan or urinal) 3  -CS     Putting on and taking off regular upper body clothing 3  -CS     Taking care of personal grooming (such as brushing teeth) 3  -CS     Eating meals 3  -CS     AM-PAC 6 Clicks Score (OT) 16  -CS       Row Name 09/27/23 0802          How much help from another person do you currently need...    Turning from your back to your side while in flat bed without using bedrails? 3  -JH     Moving from lying on back to sitting on the side of a flat bed without bedrails? 3  -JH     Moving to and from a bed to a chair (including a wheelchair)? 3  -JH     Standing up from a chair using your arms (e.g., wheelchair, bedside chair)? 3  -JH     Climbing 3-5 steps with a railing? 3  -JH     To walk in hospital room? 3  -JH     AM-PAC 6 Clicks Score (PT) 18  -JH     Highest level of mobility 6 --> Walked 10 steps or more  -               User Key  (r) = Recorded By, (t) = Taken By, (c) = Cosigned By      Initials Name Provider Type    Mary Gonzalez COTA Occupational Therapist Assistant     Michelle Schwab LPN Licensed Nurse                    Occupational Therapy Education       Title: PT OT SLP Therapies (In Progress)       Topic: Occupational Therapy (Done)       Point: ADL training (Done)       Description:   Instruct learner(s) on proper safety adaptation and remediation techniques during self care or transfers.   Instruct in proper use of assistive devices.                  Learning Progress Summary             Patient Acceptance, E,TB,D,H, VU by CS at 9/27/2023 1059    Comment: Pt was educated on home safety, fall prev, EC, and HEP.    Acceptance, E,TB, VU by RW at 9/25/2023 1311    Comment: POC, Role of OT, transfer training    Acceptance, E,TB, VU by SJ at 9/22/2023 0951    Comment: POC, role of OT,  transfer training   Family Acceptance, E,TB, VU by RW at 9/25/2023 1311    Comment: POC, Role of OT, transfer training                         Point: Home exercise program (Done)       Description:   Instruct learner(s) on appropriate technique for monitoring, assisting and/or progressing therapeutic exercises/activities.                  Learning Progress Summary             Patient Acceptance, E,TB,D,H, VU by  at 9/27/2023 1059    Comment: Pt was educated on home safety, fall prev, EC, and HEP.                         Point: Precautions (Done)       Description:   Instruct learner(s) on prescribed precautions during self-care and functional transfers.                  Learning Progress Summary             Patient Acceptance, E,TB,D,H, VU by  at 9/27/2023 1059    Comment: Pt was educated on home safety, fall prev, EC, and HEP.    Acceptance, E,TB, VU by RW at 9/25/2023 1311    Comment: POC, Role of OT, transfer training   Family Acceptance, E,TB, VU by RW at 9/25/2023 1311    Comment: POC, Role of OT, transfer training                         Point: Body mechanics (Done)       Description:   Instruct learner(s) on proper positioning and spine alignment during self-care, functional mobility activities and/or exercises.                  Learning Progress Summary             Patient Acceptance, E,TB,D,H, VU by  at 9/27/2023 1059    Comment: Pt was educated on home safety, fall prev, EC, and HEP.    Acceptance, E,TB, VU by RW at 9/25/2023 1311    Comment: POC, Role of OT, transfer training   Family Acceptance, E,TB, VU by RW at 9/25/2023 1311    Comment: POC, Role of OT, transfer training                                         User Key       Initials Effective Dates Name Provider Type Discipline     06/16/21 -  Mary Childs COTA Occupational Therapist Assistant OT     06/14/21 -  Brayden Diaz OT Occupational Therapist OT    RW 09/22/22 -  Meli Melgar OT Occupational Therapist OT                   OT Recommendation and Plan     Plan of Care Review  Plan of Care Reviewed With: patient  Progress: improving     Time Calculation:         Time Calculation- OT       Row Name 09/27/23 1100             Time Calculation- OT    OT Start Time 0856  -CS      OT Stop Time 0952  -CS      OT Time Calculation (min) 56 min  -CS      Total Timed Code Minutes- OT 56 minute(s)  -CS      OT Received On 09/27/23  -CS         Timed Charges    70877 - OT Therapeutic Exercise Minutes 15  -CS      78942 - OT Self Care/Mgmt Minutes 41  -CS         Total Minutes    Timed Charges Total Minutes 56  -CS       Total Minutes 56  -CS                User Key  (r) = Recorded By, (t) = Taken By, (c) = Cosigned By      Initials Name Provider Type    CS Mary Childs COTA Occupational Therapist Assistant                  Therapy Charges for Today       Code Description Service Date Service Provider Modifiers Qty    14502728554 HC OT THER PROC EA 15 MIN 9/27/2023 Mary Childs COTA GO 1    38252246823 HC OT SELF CARE/MGMT/TRAIN EA 15 MIN 9/27/2023 Mary Childs COTA GO 3                 JENIFER Gerber  9/27/2023

## 2023-09-27 NOTE — PLAN OF CARE
Goal Outcome Evaluation:  Plan of Care Reviewed With: patient        Progress: improving  Outcome Evaluation: VSS, pt urinating adequately, son at bedside, no complaints.

## 2023-09-27 NOTE — DISCHARGE PLACEMENT REQUEST
"Eloisa Hernandez (83 y.o. Female)     [facility]  Phone [phone]  Fax [phone]      Date of Birth   1940    Social Security Number       Address   [address] [place] [address]    Home Phone   [phone]    MRN   [id number]       [employer]'s witness   Non-Pentecostal    Marital Status                               Admission Date   9/21/23    Admission Type   Emergency    Admitting Provider   Alex Salcedo MD    Attending Provider   Alex Salcedo MD    Department, Room/Bed   [address], 434/1       Discharge Date       [place] [facility] [place]                                 Attending Provider: Alex Salcedo MD    Allergies: No Known Allergies    Isolation: None   Infection: None   Code Status: CPR    Ht: 170.2 cm (67.01\")   Wt: 64 kg (141 lb)    Admission Cmt: None   Principal Problem: Rhabdomyolysis [M62.82]                   Active Insurance as of 9/21/2023       Primary Coverage       Payor Plan Insurance Group Employer/Plan Group    MEDICARE MEDICARE A & B        Payor Plan Address Payor Plan Phone Number Payor Plan Fax Number Effective Dates    [address] [phone]  4/1/2006 - None Entered    [facility] [id number]         Subscriber Name Subscriber Birth Date Member ID       ELOISA HERNANDEZ 1940 [id number]               Secondary Coverage       Payor Plan Insurance Group Employer/Plan Group    [employer] [place] [employer] [place]        Payor Plan Address Payor Plan Phone Number Payor Plan Fax Number Effective Dates    [address] [place] [address] [phone]  1/1/2011 - None Entered    [place] [address]         Subscriber Name Subscriber Birth Date Member ID       ELOISA HERNANDEZ 1940 [id number]                     Emergency Contacts        (Rel.) Home Phone Work Phone Mobile Phone    HERNANDEZDALLAS COOPER (Son) [phone] -- [phone]    GILMA HERNANDEZ (Son) " "[phone] -- [phone]              [address] [address]  Dept. Phone:  [phone]  Dept. Fax:   Date Ordered: Sep 26, 2023         Patient:  Eloisa Chang MRN:  [id number]   [address] [id number] :  1940  SSN:    Phone: [phone] Sex:  F     Weight: 64 kg (141 lb)         Ht Readings from Last 1 Encounters:   23 170.2 cm (67.01\")         Miscellaneous DME   (Order ID: [id number])    Diagnosis:  Traumatic rhabdomyolysis, initial encounter (T79.6XXA [ICD-10-CM] 958.6 [ICD-9-CM])   Quantity:  1     Type of DME: Left platform attachment for rolling walker  Length of Need (99 Months = Lifetime): 99 Months = Lifetime        Authorizing Provider's Phone: [phone]  Verbal Order Mode: Telephone with readback   Authorizing Provider: Gabriele Franco MD  Authorizing Provider's NPI: [id number]     Order Entered By: Magda Bishop RN 2023  3:39 PM     Electronically signed by: Gabriele Franco MD 2023  3:47 PM     "

## 2023-09-27 NOTE — SIGNIFICANT NOTE
"   09/27/23 1138   OTHER   Discipline physical therapist   Rehab Time/Intention   Session Not Performed patient/family declined treatment   Therapy Assessment/Plan (PT)   Criteria for Skilled Interventions Met (PT) other (see comments)     PT tx attempted this am. Discharge orders are signed and RN states patient going home today. Family states patient has already worked with occupational therapy today and it \"wore her out\". They states not concerns with going home and patient's function at this time and defer PT treatment.   "

## 2023-09-27 NOTE — DISCHARGE SUMMARY
[facility] Medicine Services  DISCHARGE SUMMARY       Date of Admission: 9/21/2023  Date of Discharge:  9/27/2023  Primary Care Physician: Kush Newman MD    Presenting Problem/History of Present Illness:  Rhabdomyolysis [M62.82]  Compression fracture of T12 vertebra, initial encounter [S22.080A]  Traumatic rhabdomyolysis, initial encounter [T79.6XXA]       Final Discharge Diagnoses:  Active Hospital Problems    Diagnosis     **Rhabdomyolysis     UTI (urinary tract infection), bacterial        Consults:   Consults       Date and Time Order Name Status Description    9/22/2023 12:09 PM Inpatient Cardiology Consult Completed             Procedures Performed:                 Pertinent Test Results:   Lab Results (most recent)       Procedure Component Value Units Date/Time    Comprehensive Metabolic Panel [[id number]]  (Abnormal) Collected: 09/27/23 0603    Specimen: Blood Updated: 09/27/23 0643     Glucose 111 mg/dL      BUN 16 mg/dL      Creatinine 0.78 mg/dL      Sodium 135 mmol/L      Potassium 4.0 mmol/L      Chloride 104 mmol/L      CO2 21.0 mmol/L      Calcium 8.6 mg/dL      Total Protein 6.1 g/dL      Albumin 2.8 g/dL      ALT (SGPT) 30 U/L      AST (SGOT) 21 U/L      Alkaline Phosphatase 59 U/L      Total Bilirubin 0.3 mg/dL      Globulin 3.3 gm/dL      A/G Ratio 0.8 g/dL      BUN/Creatinine Ratio 20.5     Anion Gap 10.0 mmol/L      eGFR 75.9 mL/min/1.73     Narrative:      GFR Normal >60  Chronic Kidney Disease <60  Kidney Failure <15    The GFR formula is only valid for adults with stable renal function between ages 18 and 70.    CBC & Differential [[id number]]  (Abnormal) Collected: 09/27/23 0603    Specimen: Blood Updated: 09/27/23 0619    Narrative:      The following orders were created for panel order CBC & Differential.  Procedure                               Abnormality         Status                     ---------                                -----------         ------                     CBC Auto Differential[[id number]]        Abnormal            Final result               Scan Slide[[id number]]                                                                    Please view results for these tests on the individual orders.    CBC Auto Differential [[id number]]  (Abnormal) Collected: 09/27/23 0603    Specimen: Blood Updated: 09/27/23 0619     WBC 12.90 10*3/mm3      RBC 3.39 10*6/mm3      Hemoglobin 9.4 g/dL      Hematocrit 28.5 %      MCV 84.1 fL      MCH 27.7 pg      MCHC 33.0 g/dL      RDW 14.6 %      RDW-SD 44.0 fl      MPV 8.4 fL      Platelets 422 10*3/mm3      Neutrophil % 61.2 %      Lymphocyte % 18.0 %      Monocyte % 9.1 %      Eosinophil % 5.3 %      Basophil % 0.9 %      Immature Grans % 5.5 %      Neutrophils, Absolute 7.90 10*3/mm3      Lymphocytes, Absolute 2.32 10*3/mm3      Monocytes, Absolute 1.17 10*3/mm3      Eosinophils, Absolute 0.68 10*3/mm3      Basophils, Absolute 0.12 10*3/mm3      Immature Grans, Absolute 0.71 10*3/mm3      nRBC 0.0 /100 WBC     Blood Culture - Blood, Hand, Right [[id number]]  (Normal) Collected: 09/21/23 1957    Specimen: Blood from Hand, Right Updated: 09/26/23 2031     Blood Culture No growth at 5 days    Blood Culture - Blood, Arm, Left [[id number]]  (Normal) Collected: 09/21/23 1930    Specimen: Blood from Arm, Left Updated: 09/26/23 1945     Blood Culture No growth at 5 days    Manual Differential [[id number]]  (Abnormal) Collected: 09/26/23 0613    Specimen: Blood Updated: 09/26/23 0759     Neutrophil % 57.0 %      Lymphocyte % 19.0 %      Monocyte % 8.0 %      Eosinophil % 3.0 %      Basophil % 3.0 %      Bands %  3.0 %      Metamyelocyte % 7.0 %      Neutrophils Absolute 8.09 10*3/mm3      Lymphocytes Absolute 2.56 10*3/mm3      Monocytes Absolute 1.08 10*3/mm3      Eosinophils Absolute 0.40 10*3/mm3      Basophils Absolute 0.40 10*3/mm3      Anisocytosis Slight/1+     Ovalocytes Slight/1+     WBC Morphology  Normal     Platelet Estimate Adequate    CBC & Differential [[id number]]  (Abnormal) Collected: 09/26/23 0613    Specimen: Blood Updated: 09/26/23 0753    Narrative:      The following orders were created for panel order CBC & Differential.  Procedure                               Abnormality         Status                     ---------                               -----------         ------                     CBC Auto Differential[[id number]]        Abnormal            Final result               Scan Slide[[id number]]                                                                    Please view results for these tests on the individual orders.    CBC Auto Differential [[id number]]  (Abnormal) Collected: 09/26/23 0613    Specimen: Blood Updated: 09/26/23 0645     WBC 13.48 10*3/mm3      RBC 3.41 10*6/mm3      Hemoglobin 9.4 g/dL      Hematocrit 28.6 %      MCV 83.9 fL      MCH 27.6 pg      MCHC 32.9 g/dL      RDW 14.5 %      RDW-SD 43.8 fl      MPV 8.6 fL      Platelets 461 10*3/mm3      Neutrophil % 59.6 %      Lymphocyte % 20.0 %      Monocyte % 7.7 %      Eosinophil % 4.2 %      Basophil % 0.7 %      Immature Grans % 7.8 %      Neutrophils, Absolute 8.04 10*3/mm3      Lymphocytes, Absolute 2.70 10*3/mm3      Monocytes, Absolute 1.04 10*3/mm3      Eosinophils, Absolute 0.56 10*3/mm3      Basophils, Absolute 0.09 10*3/mm3      Immature Grans, Absolute 1.05 10*3/mm3      nRBC 0.0 /100 WBC     Extra Tubes [[id number]] Collected: 09/25/23 0624    Specimen: Blood, Venous Line Updated: 09/25/23 0730    Narrative:      The following orders were created for panel order Extra Tubes.  Procedure                               Abnormality         Status                     ---------                               -----------         ------                     Lavender Top[[id number]]                                     Final result                 Please view results for these tests on the individual orders.    Lavender Top  [[id number]] Collected: 09/25/23 0624    Specimen: Blood Updated: 09/25/23 0730     Extra Tube hold for add-on     Comment: Auto resulted       Basic Metabolic Panel [[id number]]  (Abnormal) Collected: 09/25/23 0623    Specimen: Blood Updated: 09/25/23 0700     Glucose 104 mg/dL      BUN 15 mg/dL      Creatinine 0.74 mg/dL      Sodium 134 mmol/L      Potassium 4.0 mmol/L      Chloride 106 mmol/L      CO2 20.0 mmol/L      Calcium 8.0 mg/dL      BUN/Creatinine Ratio 20.3     Anion Gap 8.0 mmol/L      eGFR 80.9 mL/min/1.73     Narrative:      GFR Normal >60  Chronic Kidney Disease <60  Kidney Failure <15    The GFR formula is only valid for adults with stable renal function between ages 18 and 70.    CK [[id number]]  (Normal) Collected: 09/25/23 0623    Specimen: Blood Updated: 09/25/23 0700     Creatine Kinase 128 U/L     Potassium [[id number]]  (Normal) Collected: 09/24/23 1457    Specimen: Blood Updated: 09/24/23 1523     Potassium 4.6 mmol/L     Extra Tubes [[id number]] Collected: 09/24/23 0458    Specimen: Blood, Venous Line Updated: 09/24/23 0600    Narrative:      The following orders were created for panel order Extra Tubes.  Procedure                               Abnormality         Status                     ---------                               -----------         ------                     Lavender Top[[id number]]                                     Final result                 Please view results for these tests on the individual orders.    Lavender Top [[id number]] Collected: 09/24/23 0458    Specimen: Blood Updated: 09/24/23 0600     Extra Tube hold for add-on     Comment: Auto resulted       Basic Metabolic Panel [[id number]]  (Abnormal) Collected: 09/24/23 0458    Specimen: Blood Updated: 09/24/23 0526     Glucose 134 mg/dL      BUN 14 mg/dL      Creatinine 0.70 mg/dL      Sodium 137 mmol/L      Potassium 3.4 mmol/L      Chloride 105 mmol/L      CO2 22.0 mmol/L      Calcium 8.1 mg/dL      BUN/Creatinine  Ratio 20.0     Anion Gap 10.0 mmol/L      eGFR 86.5 mL/min/1.73     Narrative:      GFR Normal >60  Chronic Kidney Disease <60  Kidney Failure <15    The GFR formula is only valid for adults with stable renal function between ages 18 and 70.    aPTT [[id number]]  (Abnormal) Collected: 09/23/23 1303    Specimen: Blood Updated: 09/23/23 1707     PTT 50.5 seconds     Narrative:      The recommended Heparin therapeutic range is 68-97 seconds.    Urine Culture - Urine, Urine, Clean Catch [[id number]] Collected: 09/21/23 1808    Specimen: Urine, Clean Catch Updated: 09/23/23 1502     Urine Culture >100,000 CFU/mL Mixed Yanelis Isolated    Narrative:      Specimen contains mixed organisms of questionable pathogenicity suggestive of contamination. If symptoms persist, suggest recollection.  Colonization of the urinary tract without infection is common. Treatment is discouraged unless the patient is symptomatic, pregnant, or undergoing an invasive urologic procedure.    POC Glucose Once [[id number]]  (Abnormal) Collected: 09/23/23 1250    Specimen: Blood Updated: 09/23/23 1303     Glucose 136 mg/dL      Comment: Sliding Scale AdminOperator: [id number] AZALEA WELSHBar[facility] ID: [id number]       aPTT [[id number]]  (Abnormal) Collected: 09/23/23 0418    Specimen: Blood Updated: 09/23/23 0448     PTT 45.0 seconds     Narrative:      The recommended Heparin therapeutic range is 68-97 seconds.    Green Top (Gel) [[id number]] Collected: 09/22/23 2031    Specimen: Blood Updated: 09/22/23 2131     Extra Tube Hold for add-ons.     Comment: Auto resulted.       Potassium [[id number]]  (Normal) Collected: 09/22/23 0534    Specimen: Blood Updated: 09/22/23 0634     Potassium 4.2 mmol/L     Comprehensive Metabolic Panel [[id number]]  (Abnormal) Collected: 09/22/23 0534    Specimen: Blood Updated: 09/22/23 0634     Glucose 154 mg/dL      BUN 23 mg/dL      Creatinine 0.80 mg/dL      Sodium 139 mmol/L      Potassium 4.2 mmol/L      Chloride 108  mmol/L      CO2 21.0 mmol/L      Calcium 8.0 mg/dL      Total Protein 6.2 g/dL      Albumin 2.9 g/dL      ALT (SGPT) 38 U/L      AST (SGOT) 51 U/L      Alkaline Phosphatase 64 U/L      Total Bilirubin 0.4 mg/dL      Globulin 3.3 gm/dL      A/G Ratio 0.9 g/dL      BUN/Creatinine Ratio 28.8     Anion Gap 10.0 mmol/L      eGFR 73.7 mL/min/1.73     Narrative:      GFR Normal >60  Chronic Kidney Disease <60  Kidney Failure <15    The GFR formula is only valid for adults with stable renal function between ages 18 and 70.    CK [[id number]]  (Abnormal) Collected: 09/22/23 0534    Specimen: Blood Updated: 09/22/23 0634     Creatine Kinase 1,083 U/L     Amylase [[id number]]  (Normal) Collected: 09/22/23 0534    Specimen: Blood Updated: 09/22/23 0634     Amylase 66 U/L     TSH [[id number]]  (Normal) Collected: 09/22/23 0534    Specimen: Blood Updated: 09/22/23 0633     TSH 2.960 uIU/mL     Lactic Acid, Plasma [[id number]]  (Normal) Collected: 09/22/23 0534    Specimen: Blood Updated: 09/22/23 0622     Lactate 1.2 mmol/L     Protime-INR [[id number]]  (Abnormal) Collected: 09/22/23 0534    Specimen: Blood Updated: 09/22/23 0619     Protime 15.7 Seconds      INR 1.26    Narrative:      Therapeutic range for most indications is 2.0-3.0 INR,  or 2.5-3.5 for mechanical heart valves.    Hemoglobin A1c [[id number]]  (Abnormal) Collected: 09/22/23 0534    Specimen: Blood Updated: 09/22/23 0555     Hemoglobin A1C 5.70 %     Narrative:      Hemoglobin A1C Ranges:    Increased Risk for Diabetes  5.7% to 6.4%  Diabetes                     >= 6.5%  Diabetic Goal                < 7.0%    Respiratory Panel PCR w/COVID-19(SARS-CoV-2) ROB/KYLER/JOSH/PAD/COR/MAD/JACIEL In-House, NP Swab in UTM/VTM, 3-4 HR TAT - Swab, Nasopharynx [[id number]]  (Normal) Collected: 09/21/23 2129    Specimen: Swab from Nasopharynx Updated: 09/21/23 2223     ADENOVIRUS, PCR Not Detected     Coronavirus 229E Not Detected     Coronavirus HKU1 Not Detected     Coronavirus NL63 Not  Detected     Coronavirus OC43 Not Detected     COVID19 Not Detected     Human Metapneumovirus Not Detected     Human Rhinovirus/Enterovirus Not Detected     Influenza A PCR Not Detected     Influenza B PCR Not Detected     Parainfluenza Virus 1 Not Detected     Parainfluenza Virus 2 Not Detected     Parainfluenza Virus 3 Not Detected     Parainfluenza Virus 4 Not Detected     RSV, PCR Not Detected     Bordetella pertussis pcr Not Detected     Bordetella parapertussis PCR Not Detected     Chlamydophila pneumoniae PCR Not Detected     Mycoplasma pneumo by PCR Not Detected    Narrative:      In the setting of a positive respiratory panel with a viral infection PLUS a negative procalcitonin without other underlying concern for bacterial infection, consider observing off antibiotics or discontinuation of antibiotics and continue supportive care. If the respiratory panel is positive for atypical bacterial infection (Bordetella pertussis, Chlamydophila pneumoniae, or Mycoplasma pneumoniae), consider antibiotic de-escalation to target atypical bacterial infection.    Urinalysis, Microscopic Only - Urine, Clean Catch [[id number]]  (Abnormal) Collected: 09/21/23 1808    Specimen: Urine, Clean Catch Updated: 09/21/23 1830     RBC, UA 13-20 /HPF      WBC, UA Too Numerous to Count /HPF      Bacteria, UA 4+ /HPF      Squamous Epithelial Cells, UA 6-12 /HPF      Hyaline Casts, UA None Seen /LPF      Methodology Manual Light Microscopy    Urinalysis With Microscopic If Indicated (No Culture) - Urine, Clean Catch [[id number]]  (Abnormal) Collected: 09/21/23 1808    Specimen: Urine, Clean Catch Updated: 09/21/23 1817     Color, UA Yellow     Appearance, UA Turbid     pH, UA 5.5     Specific Whitt, UA 1.039     Comment: Result obtained by Refractometer        Glucose, UA Negative     Ketones, UA Trace     Bilirubin, UA Negative     Blood, UA Small (1+)     Protein,  mg/dL (2+)     Leuk Esterase, UA Moderate (2+)     Nitrite, UA  Negative     Urobilinogen, UA 1.0 E.U./dL    Single High Sensitivity Troponin T [[id number]]  (Abnormal) Collected: 09/21/23 1421    Specimen: Blood Updated: 09/21/23 1451     HS Troponin T 44 ng/L     Narrative:      High Sensitive Troponin T Reference Range:  <10.0 ng/L- Negative Female for AMI  <15.0 ng/L- Negative Male for AMI  >=10 - Abnormal Female indicating possible myocardial injury.  >=15 - Abnormal Male indicating possible myocardial injury.   Clinicians would have to utilize clinical acumen, EKG, Troponin, and serial changes to determine if it is an Acute Myocardial Infarction or myocardial injury due to an underlying chronic condition.         Magnesium [[id number]]  (Normal) Collected: 09/21/23 1421    Specimen: Blood Updated: 09/21/23 1451     Magnesium 1.9 mg/dL     BNP [[id number]]  (Abnormal) Collected: 09/21/23 1421    Specimen: Blood Updated: 09/21/23 1449     proBNP 2,180.0 pg/mL     Narrative:      Among patients with dyspnea, NT-proBNP is highly sensitive for the detection of acute congestive heart failure. In addition NT-proBNP of <300 pg/ml effectively rules out acute congestive heart failure with 99% negative predictive value.      Lactic Acid, Plasma [[id number]]  (Normal) Collected: 09/21/23 1421    Specimen: Blood Updated: 09/21/23 1445     Lactate 2.0 mmol/L           Imaging Results (Most Recent)       Procedure Component Value Units Date/Time    XR Ankle 2 View Right [[id number]] Collected: 09/25/23 1413     Updated: 09/25/23 1417    Narrative:      XR ANKLE 2 VW RIGHT    HISTORY: pain    COMPARISON: None    FINDINGS:  Frontal and lateral radiographs of the right ankle were provided for review.    There is no evidence of acute fracture or dislocation. The soft tissues are  normal in appearance. Mild degenerative changes.        Impression:      1. Mild degenerative changes no acute fracture.    MRI Brain With & Without Contrast [[id number]] Collected: 09/23/23 1628     Updated: 09/23/23  1703    Narrative:      MRI brain with and without contrast:    COMPARISON:  No comparison.    History:  Found down after 30 hours.    TECHNIQUE:  Multi-planar, multi-sequence images were obtained through the brain before and  after administration of 14 mL ProHance IV contrast.    FINDINGS:  A tiny single subcentimeter focus of restricted diffusion in the superior left  frontal region.  Scattered foci of restricted diffusion involving the right  frontal, parietal and occipital lobes.    No midline shift, mass effect or hydrocephalus.  Scattered T2 and FLAIR signal  abnormalities in the subcortical and periventricular white matter suggesting  chronic small vessel ischemic changes for age.  The postcontrast sequences are  markedly limited by motion.  No gross intracranial enhancement appreciated.  Large vessels at skull base demonstrate grossly normal signal voids.      Impression:      Impression:    1.  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    2. No midline shift or mass effect.    3.  The postcontrast sequences are markedly limited by motion.  No gross  abnormal intracranial enhancement.    XR Knee 3 View Bilateral [[id number]] Collected: 09/23/23 1132     Updated: 09/23/23 1221    Narrative:      INDICATION:  sp fall.    COMPARISON:  None relevant.    FINDINGS:  3 views of both knees were obtained.  Neither knee shows acute fracture,  dislocation or malalignment.  Severe degenerative changes throughout the right  knee.    US Guided Vascular Access [[id number]] Collected: 09/23/23 1052     Updated: 09/23/23 1217    Narrative:      Ultrasound guidance vascular    FINDINGS:  Real-time ultrasound imaging was provided for vascular access.  Please refer to  procedure note for real-time exam findings.  The right basilic vein was found to  be patent and compressible.  Access under  direct sonographic visualization.  Permanent saved images sent to the PACS for documentation.      IR Insert Midline Without Port Pump 5 Plus [[id number]] Resulted: 09/23/23 1029     Updated: 09/23/23 1029    Narrative:      This procedure was auto-finalized with no dictation required.    XR Hand 2 View Left [[id number]] Collected: 09/22/23 1407     Updated: 09/22/23 1413    Narrative:      TECHNIQUE:  PA, oblique and lateral views of the left hand    HISTORY:  Pain and swelling.    COMPARISON:  None.    FINDINGS:  Prominent arthritis at the index DIP joint, probably degenerative.  Less severe  joint space narrowing throughout the remainder of the IP joints of the fingers  and thumb.  No erosion.  Diffuse osteopenia.  No fracture is seen.    Please refer to separate dictation of the wrist.        XR Forearm 2 View Left [[id number]] Collected: 09/22/23 1352     Updated: 09/22/23 1355    Narrative:      History: pain    COMPARISON: None    FINDINGS:  AP and lateral views were obtained.    There is no fracture, dislocation or osseous destruction.        Impression:      No acute abnormality.      XR Wrist 2 View Left [[id number]] Collected: 09/22/23 1337     Updated: 09/22/23 1342    Narrative:      XR WRIST LEFT 2 VIEWS    HISTORY: pain    COMPARISON: None    FINDINGS:      No significant soft tissue swelling.    No fracture or dislocation.    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          XR Pelvis 1 or 2 View [[id number]] Collected: 09/22/23 1253     Updated: 09/22/23 1258    Narrative:      XR PELVIS 1 OR 2 VIEWS    HISTORY: sp fall    COMPARISON: None    FINDINGS:  There is no evidence of fracture or dislocation.    The joint spaces are maintained.    Low-lying bladder I cannot exclude partial prolapse..          Impression:      1. No acute fracture. Low-lying bladder. I cannot exclude partial prolapse..        US Carotid  Bilateral [[id number]] Collected: 09/21/23 2119     Updated: 09/21/23 2135    Narrative:      COMPARISON:  None.    TECHNIQUE:  High-resolution gray scale imaging, color Doppler, velocity recordings, and  spectral analysis of the common carotid, external carotid, and internal carotid  arteries bilaterally were obtained.  Waveform analysis of the vertebral arteries  bilaterally was completed.  Using consensus data for these images, velocity  values and waveform analysis, a standard percent stenosis is reported.  Velocity  criteria are extrapolated from diameter data derived from the Intersocietal  Accreditation Committee recommended amendment to the Society of Radiologists in  ultrasound Consensus Conference in 2003; 229;340-346.  Reference: IAC Carotid  Criteria White Paper 1-2014.    FINDINGS:  Right: Peak systolic velocity in the right common carotid is 69 cm/s and 77 cm/s  in the right internal carotid artery. There is antegrade flow in the right  vertebral. The peak systolic internal to common carotid ratio is 1.1.    Left: Peak systolic velocity in the left common carotid is 74 cm/s and 64 cm/s  in the left internal carotid artery. There is antegrade flow in the left  vertebral. Peak systolic internal to common carotid ratio on the left 0.8.    There is mild plaque in both carotid bulbs.      Impression:      1. Mild plaque in both carotid bulbs with stenosis less than 50% by hemodynamic  criteria.    CT Thoracic Spine Without Contrast [[id number]] Collected: 09/21/23 1643     Updated: 09/21/23 1921    Narrative:      INDICATION:  Trauma.    COMPARISON:  None relevant.    TECHNIQUE:  Helical CT of the thoracic spine was performed without intravenous contrast.  Multiplanar reformations were provided.    FINDINGS:  There is height loss of T12 which is age indeterminate, but appears to be  chronic.  There may be an acute component.  Remainder of the thoracic spine  shows no acute trauma.  Alignment appears to be  intact.  There is multilevel  spondylosis.      Impression:      1.  Probable acute on chronic superior endplate fracture of T12.  If there is  further need to date the fracture, MRI can be obtained to assess for the  presence of edema.  2.  No additional compression deformities are seen within the thoracic spine.        CT Lumbar Spine Without Contrast [[id number]] Collected: 09/21/23 1646     Updated: 09/21/23 1921    Narrative:      INDICATION:  Trauma.    COMPARISON:  None relevant.    TECHNIQUE:  Helical CT of the lumbar spine was performed without intravenous contrast.  Multiplanar reformations were provided.    FINDINGS:  There is an acute on chronic appearing fracture of T12.  Within the lumbar spine  alignment appears to be intact.  No additional fractures are seen.  Mild  multilevel spondylosis and facet arthrosis.  Diffuse vascular calcification.      Impression:      Acute on chronic appearing superior endplate compression deformity  of T12.  No evidence of fracture within the lumbar spine.        CT Chest With Contrast Diagnostic [[id number]] Collected: 09/21/23 1641     Updated: 09/21/23 1920    Narrative:      INDICATION:  Trauma.    COMPARISON:  None relevant.    TECHNIQUE:  Helical CT of the chest was performed with intravenous contrast.  Multiplanar  reformations were provided.    FINDINGS:  Cardiac size is not enlarged.  Diffuse vascular calcification.  No pleural  effusion or pneumothorax.  No worrisome pulmonary nodules.  No acute osseous  trauma is visualized.  Chronic right-sided rib fractures.  T12 fracture.      Impression:      1.  Probable acute on chronic superior endplate fracture of T12.  If there is  further need to date the fracture, MRI can be obtained to assess for the  presence of edema.  2.  No additional acute process within the thoracic spine.        CT Abdomen Pelvis With Contrast [[id number]] Collected: 09/21/23 1642     Updated: 09/21/23 1920    Narrative:       INDICATION:  Trauma.    COMPARISON:  None relevant.    TECHNIQUE:  Helical CT of the abdomen and pelvis was performed with intravenous contrast.  Multiplanar reformations were provided.    FINDINGS:  Lung bases are clear.    Liver, spleen, adrenals, pancreas stomach and kidneys show no acute process.  No  free air or free fluid.  Normal volume of colonic stool.  Cholelithiasis.      Impression:      Negative for acute trauma within the abdomen or pelvis.      CT Cervical Spine Without Contrast [[id number]] Collected: 09/21/23 1612     Updated: 09/21/23 1638    Narrative:      CT CERVICAL SPINE WITHOUT CONTRAST:    INDICATION:  Trauma.    TECHNIQUE:  Axial images from the base of the skull through the thoracic inlet were  performed followed by 2D multiplanar reformats.    FINDINGS:  There is degenerative change and osteopenia.  Study is negative for fracture or  other acute abnormality.    SUMMARY:  No acute findings.    CT Head Without Contrast [[id number]] Collected: 09/21/23 1611     Updated: 09/21/23 1638    Narrative:      CT HEAD WITHOUT CONTRAST:    INDICATION:  Trauma.    TECHNIQUE:  Axial images were performed from the calvarium through the base of the skull  followed by 2D multiplanar reformats.    FINDINGS:  There is age-related atrophy.  No hemorrhage or other acute abnormality seen.  No focal abnormal densities.    SUMMARY:  No acute findings.    CT Maxillofacial Without Contrast [[id number]] Collected: 09/21/23 1613     Updated: 09/21/23 1638    Narrative:      INDICATION:  Trauma.    TECHNIQUE:  Axial images from the base of the skull through the mandible were performed  followed by 2D multiplanar reformats.    FINDINGS:  No fracture or other acute abnormality demonstrated.  Paraspinal sinuses are  clear.    SUMMARY:  Negative.            Chief Complaint on Day of Discharge: none    Hospital Course:  The patient is a 83 y.o. female who presented to [facility] with sp fall, change in  mental status.  83-year-old male comes to the ER from home after being found on the floor of her bathroom. Spoke to her sons Bruce and Bao, they found her on the bathroom floor, maybe after 36 hours. Today son says she complained of numbness left forearm 3 days ago.  Patient does not remember what happened, now more oriented and denies chest pain, no shortness of breath, no fever, no urinary or GI symptoms. Family thinks she was on the ground for 2 nights. She is complaining of left hip pain. PMH: Type 2 diabetes, hyperlipidemia, essential hypertension, osteoporosis. Today alert and more oriented with his son at bedside  Assessment and Plan: 83 years old female patient found on the floor after 2 days, probable cardiogenic syncope, afib RVR. Had renal failure, rhabdomyolysis. Hypokalemia. Leucocytosis from UTI.  Now on full anticoagulation and rate control off  cardizem drip, on telemetry. MRI: Scattered areas of acute infarct predominantly within the right frontal, parietal, and occipital lobes with a single small subcentimeter focus in the superior left frontal lobe.  Problems:  1-Atrial fibrillation with RVR, Cardizem drip was stopped. Probable Afib RVR at home that caused syncope. Lopressor titrated by cardiology. Discussed with Bruce (son)  about discharge planning Home health.  2-Rhabdomyolysis, resolved  3-Renal failure, prerenal. Resolved  4-UTI. High WBC, urine and blood cultures in process, abnormal urine analysis. UTI. Urine culture with contamination. Blood cultures negative at 5 days. Started Rocephin. Switched to Augmentin PO  5-Hypokalemia, electrolyte replacement protocol. resolved  6-Old T12 and right ribs fractures, underlined osteoporosis  7-Frequent falls, PT OT, X rays knees only degenerative changes, X ray right ankle no fracture,has a callus on sole  8-HFpEF, left atrium enlargement, tricuspid regurgitation, group 2 pulmonary hypertension, cardiology on board. On betablocker and  "CCB  9-osteoarthritis of both knees and hands     Condition on Discharge:  good    Physical Exam on Discharge:  /65 (BP Location: Left arm, Patient Position: Lying)   Pulse 64   Temp 98.2 °F (36.8 °C) (Temporal)   Resp 18   Ht 170.2 cm (67.01\")   Wt 64 kg (141 lb)   SpO2 98%   BMI 22.08 kg/m²   Physical Exam  Constitutional:       Appearance: Normal appearance.   HENT:      Head: Normocephalic and atraumatic.      Right Ear: Tympanic membrane normal.      Left Ear: Tympanic membrane normal.      Nose: Nose normal.      Mouth/Throat:      Mouth: Mucous membranes are moist.   Eyes:      Pupils: Pupils are equal, round, and reactive to light.   Cardiovascular:      Rate and Rhythm: Normal rate and regular rhythm.      Pulses: Normal pulses.      Heart sounds: Normal heart sounds.   Pulmonary:      Effort: Pulmonary effort is normal.      Breath sounds: Normal breath sounds.   Abdominal:      General: Abdomen is flat. Bowel sounds are normal.      Palpations: Abdomen is soft.   Musculoskeletal:         General: Normal range of motion.      Cervical back: Normal range of motion and neck supple.   Skin:     General: Skin is warm and dry.      Capillary Refill: Capillary refill takes less than 2 seconds.   Neurological:      General: No focal deficit present.      Mental Status: She is alert and oriented to person, place, and time.   Psychiatric:         Mood and Affect: Mood normal.         Behavior: Behavior normal.         Thought Content: Thought content normal.         Judgment: Judgment normal.         Discharge Disposition:  Home-Health Care c    Discharge Medications:     Discharge Medications        New Medications        Instructions Start Date   amLODIPine 10 MG tablet  Commonly known as: NORVASC   10 mg, Oral, Every 24 Hours Scheduled      amoxicillin-clavulanate 875-125 MG per tablet  Commonly known as: AUGMENTIN   1 tablet, Oral, 2 Times Daily      apixaban 5 MG tablet tablet  Commonly known " as: ELIQUIS   5 mg, Oral, Every 12 Hours Scheduled      famotidine 40 MG tablet  Commonly known as: PEPCID   40 mg, Oral, Daily      metoprolol tartrate 100 MG tablet  Commonly known as: LOPRESSOR   100 mg, Oral, Every 12 Hours Scheduled             Continue These Medications        Instructions Start Date   lisinopril 40 MG tablet  Commonly known as: PRINIVIL,ZESTRIL   40 mg, Oral, Daily               Discharge Diet:   Diet Instructions       Diet: Cardiac Diets; No Salt Packet; Regular Texture (IDDSI 7); Thin (IDDSI 0)      Discharge Diet: Cardiac Diets    Cardiac Diet: No Salt Packet    Texture: Regular Texture (IDDSI 7)    Fluid Consistency: Thin (IDDSI 0)    Diet: Cardiac Diets; No Salt Packet; Regular Texture (IDDSI 7); Thin (IDDSI 0)      Discharge Diet: Cardiac Diets    Cardiac Diet: No Salt Packet    Texture: Regular Texture (IDDSI 7)    Fluid Consistency: Thin (IDDSI 0)            Activity at Discharge:   Activity Instructions       Activity as Tolerated      Activity as Tolerated              Discharge Care Plan/Instructions: Follow cardiology Dr Stewart, neurology and primary care physician. Anticoagulation with apixaban, betablocker, norvasc, augmentin    Follow-up Appointments:   Future Appointments   Date Time Provider Department Center   9/28/2023 To Be Determined Nataliia Millard RN [facility] [place]   9/29/2023 To Be Determined Indio Tafoya PT [facility] [place]   9/29/2023 To Be Determined Mary Jane Bustillos OT [facility] [place]       Test Results Pending at Discharge:     The patient has current or prior documentation of left ventricular ejection fraction (LVEF) less than or equal to 40%, or moderate or severely depressed left ventricular systolic function. ; The patient was prescribed or already taking a beta-blocker.      This document has been electronically signed by Gabriele Franco MD on September 27, 2023 09:01 CDT      Time: 9:00

## 2023-09-28 ENCOUNTER — READMISSION MANAGEMENT (OUTPATIENT)
Dept: CALL CENTER | Facility: HOSPITAL | Age: 83
End: 2023-09-28
Payer: MEDICARE

## 2023-09-28 ENCOUNTER — HOME CARE VISIT (OUTPATIENT)
Dept: HOME HEALTH SERVICES | Facility: CLINIC | Age: 83
End: 2023-09-28
Payer: MEDICARE

## 2023-09-28 VITALS
RESPIRATION RATE: 18 BRPM | OXYGEN SATURATION: 98 % | HEART RATE: 60 BPM | DIASTOLIC BLOOD PRESSURE: 68 MMHG | SYSTOLIC BLOOD PRESSURE: 130 MMHG | TEMPERATURE: 97.9 F

## 2023-09-28 VITALS
HEART RATE: 58 BPM | OXYGEN SATURATION: 98 % | DIASTOLIC BLOOD PRESSURE: 68 MMHG | SYSTOLIC BLOOD PRESSURE: 132 MMHG | RESPIRATION RATE: 18 BRPM | TEMPERATURE: 97.9 F

## 2023-09-28 PROCEDURE — G0299 HHS/HOSPICE OF RN EA 15 MIN: HCPCS

## 2023-09-28 PROCEDURE — G0152 HHCP-SERV OF OT,EA 15 MIN: HCPCS

## 2023-09-28 PROCEDURE — G0151 HHCP-SERV OF PT,EA 15 MIN: HCPCS

## 2023-09-28 NOTE — Clinical Note
"Huddle  / Case Conference Note  Medical Necessity and focus of care:  PATIENT PRESENTS WITH HIGH FALL RISK, WEAKNESS, AND   AFTER RECENT HOSPITALIZATION FOR FALL, TRAUMATIC RHABDOMYOLYSIS, HYPOKALEMIA, UTI, LEUKOCYTOSIS, A-FIB WITH RVR, RNAL FAILURE, OLD COMPRESSION FRACTURE T12, CVA WITH LEFT SIDED WEAKNESS.  SHE REQUIRES SKILLED OT SERVICES FOR FUNCTIONAL TRANSFER TRAINING FOR ADLS, SAFETY EDUCATION/TRAINING, LEFT UE THER. EX FOR STRENGTHENING, LEFT UE GROSS MOTOR COORDINATION TREATMENT, LEFT UE FINE MOTOR COORDINATION TREATMENT, L UE STRENGTHENING/COORDINATION HEP EDUCATION, STANDING TOLERANCE TRAINING FOR ADLS.  Caregiver Status: SELF, SON, DAUGHTER IN LAW  Patient's goal(s): \"GET BACK TO THE WAY I WAS\"  Environmental/ Physical issues: NONE  Any additional needs: NONE    Based upon record review and collaboration conference, the recommended frequency for this patient is   SN-----  PT-----  OT---- 1 WEEK 1, 2 WEEK 3, 1 WEEK 1  ST-----  HHA---  MSW---  Provider_DR AKERS_ Notified 9/28/23 and agrees with POC     Please verify your eval  scores: (Answer the scores  that pertain to your area of focus remove the others)   : 5  : 2  : 3    "

## 2023-09-28 NOTE — OUTREACH NOTE
Prep Survey      Flowsheet Row Responses   Religious facility patient discharged from? [facility]   Is LACE score < 7 ? No   Eligibility Readm Mgmt   Discharge diagnosis Rhabdomyolysis, UTI   Does the patient have one of the following disease processes/diagnoses(primary or secondary)? Other   Does the patient have Home health ordered? Yes   What is the Home health agency?  [facility]    Is there a DME ordered? Yes   What DME was ordered? RW and platform attachment- [facility]   Prep survey completed? Yes            Susanne VALDEZ - Registered Nurse

## 2023-09-28 NOTE — HOME HEALTH
OCCUPATIONAL THERAPY EVALUATION     REASON FOR REFERRAL:  84 Y/O FEMALE HOSPITALIZED AT [facility] 9/21/23-9/27/23AFTER FALLING AND DOUND IN BATHROOM FLOOR AFTER ~36 HOURS.  SHE WAS TREATED FOR TRAUMATIC RHABDOMYOLYSIS, HYPOKALEMIA, UTI, LEUKOCYTOSIS, A-FIB WITH RVR, RENAL FAILURE, OLD COMPRESSION FRACTURE T12, AND CVA WITH LEFT SIDED WEAKNESS.  PATIENT WITH HISTORY OF FREQUENT FALLS.  MRI SHOWED: acute infarct predominantly within the right frontal, parietal, and occipital lobes with a single small subcentimeter focus in the superior left frontal lobe.     PAST MEDICAL HISTORY: DM, HLD, HTN, CHF, OA, CVA, OSTEOPOROSIS, HYSTERECTOMY.    HOME SOCIAL ENVIRONMENT: PATIENT LIVES ALONE IN ONE LEVEL HOME.  ARNOL IS CURRENTLY STAYING WITH HER SON AND DAUGHTER IN LAW IN ONE LEVEL HOME.  PATIENT'S SON WORKS HOWEVER IS NOT WORKING AGAIN UNTIL MONDAY 10/2/23 AND PATIENT WILL HAVE SOMEONE WITH HER AT ALL TIMES UNTIL THAT DATE.    PRIOR LEVEL OF FUNCTION:  INDEPENDENT WITH ADLS, IADLS, FUNCTIONAL TRANSFERS/FUNCTIONAL MOBILITY WITHOUT AD.  DROVE SELF AND INDEPENDENT OUT IN THE COMMUNITY.      CLINICAL FINDINGS:  UPPER EXTREMITY ASSESSMENT: AROM B UES: WFL.   STRENGTH: RIGHT UE: 4+/5-5/5 GROSSLY THROUGHOUT.  STRENGTH: LEFT UE: 3/5 GROSSLY THROUGHOUT.  /PINCH:  RIGHT HAND DOMINANT, RIGHT  STRENGTH: 5/5 AND LEFT  STRENGTH: 3+/5.                                FINE MOTOR COORDINATION:  RIGHT UE: WFL AND LEFT UE: SEVERE DEFICIT.     NINE HOLE PEG TEST; 6 MINUTES AND 46 SECONDS          UPPER EXTREMITY GROSS MOTOR COORDINATION: RIGHT UE: WFL AND LEFT UE: SEVERE DEFICIT.  SENSATION: INTACT R UE GROSSLY THROUGHOUT HOWEVER IMPAIRED LEFT UE SENSATION WITH COMPLAINTS OF NUMBNESS SINCE MOST RECENT CVA.                                                                  FUNCTIONAL ENDURANCE FOR SAFE ACTIVITIES OF DAILY LIVING/INSTRUMENTAL ACTIVITIES OF DAILY LIVING:  Limited endurance noted throughout evaluation requiring frequent rest  "breaks.       BALANCE:             SITTING BALANCE: FAIR  STANDING BALANCE:  FAIR  WEIGHT BEARING STATUS/PRECAUTIONS:  WBAT/HIGH FALL RISK  ASSISTIVE DEVICES: RW WITH LEFT UE PLATFORM (PLATFORM REMOVED), BSC, SHOWER CHAIR WITHOUT BACK IN TUB/SHOWER COMBO, GRAB BARS X2 IN SHOWER.     MEDICAL NECESSITY:  PATIENT PRESENTS WITH HIGH FALL RISK, WEAKNESS, AND   AFTER RECENT HOSPITALIZATION FOR FALL, TRAUMATIC RHABDOMYOLYSIS, HYPOKALEMIA, UTI, LEUKOCYTOSIS, A-FIB WITH RVR, RNAL FAILURE, OLD COMPRESSION FRACTURE T12, CVA WITH LEFT SIDED WEAKNESS.  SHE REQUIRES SKILLED OT SERVICES FOR FUNCTIONAL TRANSFER TRAINING FOR ADLS, SAFETY EDUCATION/TRAINING, LEFT UE THER. EX FOR STRENGTHENING, LEFT UE GROSS MOTOR COORDINATION TREATMENT, LEFT UE FINE MOTOR COORDINATION TREATMENT, L UE STRENGTHENING/COORDINATION HEP EDUCATION, STANDING TOLERANCE TRAINING FOR ADLS.     PATIENT GOAL FOR THIS EPISODE OF CARE: \"GET BACK TO THE WAY I WAS\"  REHAB POTENTIAL :  [facility]  DATE OF NEXT APPOINTMENT WITH DOCTOR: 10/2/23 JIGNESH ARTEAGA    ANTICIPATED DISCHARGE PLAN:  TO SELF/CAREGIVER  PATIENT / CAREGIVER AGREE WITH DISCHARGE PLAN: YES  COMMUNICATION / CARE COORDINATION:  Case communication note to admitting RN with Functional [place] Scores/# of visits.  WISH TO ADDRESS BATHING WITH OT: NO"

## 2023-09-29 NOTE — HOME HEALTH
Patient is a 82 y/o female who was seen for OASIS SOC with physical therapy evaluation on 9/28/23 secondary to presented to [facility] with sp fall, change in mental status.  83-year-old male comes to the ER from home after being found on the floor of her bathroom. found her on the bathroom floor, maybe after 36 hours. Today son says she complained of numbness left forearm 3 days ago. Patient does not remember what happened, patient found on the floor after 2 days, probable cardiogenic syncope, afib RVR. Had renal failure, rhabdomyolysis. Hypokalemia. Leucocytosis from UTI. Patient also found to have acute CVA      Focus of care: The PT focus of care for this patient will include but but be limited to: bilateral LE strength training to improve functional mobility, preserve muscle tone and increase muscular endurance to help reduce levels of fatigue and improve activity tolerance, gait and transfer training to aid in reduction of fall risk and improve patient safety w/ mobility, balance training to prevent falls over course care and improve functional balance outcome scores.    PMH includes: Diabetes mellitus Hypertension Stroke    Upon physical therapy evalaution, patient presents with strength, transfer, balance, endurance, and gait deficits as noted in physical therapy assessment. These deficits are limiting patient's overall functional capacity w/ independence, activity, ADLs, and IADLs. Patient will benefit from skilled physical therapy to address the defcitis noted above and recorded over course of care to allow patient to return to PLOF and max level of independence.    Subjective: Patient states that she is doing fair today. States that she thinks she is getting a little better each day and that her strength is starting to improve some. States that she is trying to walk and do exercises frequently to build her strength back up with goal of returning to her home.

## 2023-10-03 VITALS
HEART RATE: 78 BPM | SYSTOLIC BLOOD PRESSURE: 128 MMHG | DIASTOLIC BLOOD PRESSURE: 70 MMHG | TEMPERATURE: 98.4 F | RESPIRATION RATE: 20 BRPM | OXYGEN SATURATION: 96 %

## 2023-10-03 NOTE — CASE COMMUNICATION
"Huddle  / Case Conference Note  Medical Necessity and focus of care: Patient is a 84 y/o female who was seen for OASIS SOC with physical therapy evaluation on 9/28/23 secondary to presented to [facility] with sp fall, change in mental status.  83-year-old male comes to the ER from home after being found on the floor of her bathroom. found her on the bathroom floor, maybe after 36 hours. Today son says she complained of  numbness left forearm 3 days ago. Patient does not remember what happened, patient found on the floor after 2 days, probable cardiogenic syncope, afib RVR. Had renal failure, rhabdomyolysis. Hypokalemia. Leucocytosis from UTI. Patient also found to have acute CVA  PT focus of care for this patient will include but but be limited to: bilateral LE strength training to improve functional mobility, preserve muscle tone and increase muscular  endurance to help reduce levels of fatigue and improve activity tolerance, gait and transfer training to aid in reduction of fall risk and improve patient safety w/ mobility, balance training to prevent falls over course care and improve functional balance outcome scores.    Caregiver Status: staying with son and family nearby to assist as needed  Patient's goal(s): \"get stronger, get back to living on my own\"  GG Discharge Goal: 5  Me dication [place]: n/a   Environmental/ Physical issues: left sided weakness following CVA  Any additional needs: n/a    Based upon record review and collaboration conference, the recommended frequency for this patient is   SN-----  PT----- 1w1, 2w3  OT----  ST-----  HHA---  MSW---  Provider__Dr. Newman________ Notified @ _9/28/23________ and agrees with POC     Please verify your eval  scores: (Answer the scores  that pertain to your are a of focus remove the others)    3   2  "

## 2023-10-04 ENCOUNTER — HOME CARE VISIT (OUTPATIENT)
Dept: HOME HEALTH SERVICES | Facility: CLINIC | Age: 83
End: 2023-10-04
Payer: MEDICARE

## 2023-10-04 VITALS
DIASTOLIC BLOOD PRESSURE: 58 MMHG | SYSTOLIC BLOOD PRESSURE: 120 MMHG | RESPIRATION RATE: 18 BRPM | OXYGEN SATURATION: 94 % | HEART RATE: 45 BPM

## 2023-10-04 PROCEDURE — G0157 HHC PT ASSISTANT EA 15: HCPCS

## 2023-10-04 PROCEDURE — G0158 HHC OT ASSISTANT EA 15: HCPCS

## 2023-10-05 ENCOUNTER — HOME CARE VISIT (OUTPATIENT)
Dept: HOME HEALTH SERVICES | Facility: CLINIC | Age: 83
End: 2023-10-05
Payer: MEDICARE

## 2023-10-05 VITALS
DIASTOLIC BLOOD PRESSURE: 60 MMHG | HEART RATE: 54 BPM | TEMPERATURE: 97 F | OXYGEN SATURATION: 97 % | SYSTOLIC BLOOD PRESSURE: 122 MMHG | RESPIRATION RATE: 16 BRPM

## 2023-10-05 PROCEDURE — G0300 HHS/HOSPICE OF LPN EA 15 MIN: HCPCS

## 2023-10-06 ENCOUNTER — HOME CARE VISIT (OUTPATIENT)
Dept: HOME HEALTH SERVICES | Facility: CLINIC | Age: 83
End: 2023-10-06
Payer: MEDICARE

## 2023-10-06 VITALS
HEART RATE: 48 BPM | SYSTOLIC BLOOD PRESSURE: 115 MMHG | OXYGEN SATURATION: 96 % | RESPIRATION RATE: 18 BRPM | DIASTOLIC BLOOD PRESSURE: 60 MMHG

## 2023-10-06 PROCEDURE — G0158 HHC OT ASSISTANT EA 15: HCPCS

## 2023-10-09 ENCOUNTER — HOME CARE VISIT (OUTPATIENT)
Dept: HOME HEALTH SERVICES | Facility: CLINIC | Age: 83
End: 2023-10-09
Payer: MEDICARE

## 2023-10-09 VITALS
RESPIRATION RATE: 18 BRPM | DIASTOLIC BLOOD PRESSURE: 58 MMHG | TEMPERATURE: 97 F | SYSTOLIC BLOOD PRESSURE: 120 MMHG | OXYGEN SATURATION: 96 % | HEART RATE: 45 BPM

## 2023-10-09 PROCEDURE — G0158 HHC OT ASSISTANT EA 15: HCPCS

## 2023-10-09 NOTE — CASE COMMUNICATION
Patient was seen by me on Oct 4th Wednesday and then it was reported to me on my next visit Friday that patient had fallen Wednesday night and made a trip to the ED with a fracture of the Left Wrist/arm no sure exactly where.  Patient now has a splint/cast on the L wrist/forearm area down past the left thumb. Since some of the goals were for left FMC and strength etc I wanted to make you aware of the the new developments to see if you t hink you need to visit the patient for a reassessment or if you need to change or discontinue goals?  Thank you.

## 2023-10-10 ENCOUNTER — HOME CARE VISIT (OUTPATIENT)
Dept: HOME HEALTH SERVICES | Facility: CLINIC | Age: 83
End: 2023-10-10
Payer: MEDICARE

## 2023-10-10 VITALS
DIASTOLIC BLOOD PRESSURE: 64 MMHG | HEART RATE: 55 BPM | TEMPERATURE: 98 F | SYSTOLIC BLOOD PRESSURE: 124 MMHG | RESPIRATION RATE: 18 BRPM | OXYGEN SATURATION: 98 %

## 2023-10-10 VITALS
OXYGEN SATURATION: 95 % | DIASTOLIC BLOOD PRESSURE: 50 MMHG | RESPIRATION RATE: 18 BRPM | HEART RATE: 49 BPM | SYSTOLIC BLOOD PRESSURE: 110 MMHG

## 2023-10-10 PROCEDURE — G0157 HHC PT ASSISTANT EA 15: HCPCS

## 2023-10-12 ENCOUNTER — HOME CARE VISIT (OUTPATIENT)
Dept: HOME HEALTH SERVICES | Facility: CLINIC | Age: 83
End: 2023-10-12
Payer: MEDICARE

## 2023-10-12 VITALS
OXYGEN SATURATION: 97 % | TEMPERATURE: 98.1 F | HEART RATE: 54 BPM | DIASTOLIC BLOOD PRESSURE: 64 MMHG | RESPIRATION RATE: 20 BRPM | SYSTOLIC BLOOD PRESSURE: 100 MMHG

## 2023-10-12 VITALS
OXYGEN SATURATION: 96 % | TEMPERATURE: 98.3 F | SYSTOLIC BLOOD PRESSURE: 110 MMHG | HEART RATE: 50 BPM | RESPIRATION RATE: 18 BRPM | DIASTOLIC BLOOD PRESSURE: 58 MMHG

## 2023-10-12 PROCEDURE — G0157 HHC PT ASSISTANT EA 15: HCPCS

## 2023-10-12 PROCEDURE — G0300 HHS/HOSPICE OF LPN EA 15 MIN: HCPCS

## 2023-10-13 ENCOUNTER — HOME CARE VISIT (OUTPATIENT)
Dept: HOME HEALTH SERVICES | Facility: CLINIC | Age: 83
End: 2023-10-13
Payer: MEDICARE

## 2023-10-13 VITALS
DIASTOLIC BLOOD PRESSURE: 50 MMHG | HEART RATE: 45 BPM | SYSTOLIC BLOOD PRESSURE: 110 MMHG | RESPIRATION RATE: 19 BRPM | OXYGEN SATURATION: 95 %

## 2023-10-13 PROCEDURE — G0158 HHC OT ASSISTANT EA 15: HCPCS

## 2023-10-16 ENCOUNTER — HOME CARE VISIT (OUTPATIENT)
Dept: HOME HEALTH SERVICES | Facility: CLINIC | Age: 83
End: 2023-10-16
Payer: MEDICARE

## 2023-10-16 NOTE — HOME HEALTH
Unable to see patient no answer at the door due to patient had appointments,.  Rescheduled for later this week.

## 2023-10-17 ENCOUNTER — HOME CARE VISIT (OUTPATIENT)
Dept: HOME HEALTH SERVICES | Facility: CLINIC | Age: 83
End: 2023-10-17
Payer: MEDICARE

## 2023-10-17 VITALS
HEART RATE: 50 BPM | OXYGEN SATURATION: 96 % | DIASTOLIC BLOOD PRESSURE: 62 MMHG | SYSTOLIC BLOOD PRESSURE: 132 MMHG | RESPIRATION RATE: 18 BRPM | TEMPERATURE: 98.1 F

## 2023-10-17 PROCEDURE — G0157 HHC PT ASSISTANT EA 15: HCPCS

## 2023-10-18 ENCOUNTER — HOME CARE VISIT (OUTPATIENT)
Dept: HOME HEALTH SERVICES | Facility: HOME HEALTHCARE | Age: 83
End: 2023-10-18
Payer: MEDICARE

## 2023-10-18 ENCOUNTER — HOME CARE VISIT (OUTPATIENT)
Dept: HOME HEALTH SERVICES | Facility: CLINIC | Age: 83
End: 2023-10-18
Payer: MEDICARE

## 2023-10-18 VITALS
HEART RATE: 50 BPM | RESPIRATION RATE: 18 BRPM | DIASTOLIC BLOOD PRESSURE: 52 MMHG | SYSTOLIC BLOOD PRESSURE: 118 MMHG | OXYGEN SATURATION: 98 %

## 2023-10-18 PROCEDURE — G0158 HHC OT ASSISTANT EA 15: HCPCS

## 2023-10-18 PROCEDURE — G0151 HHCP-SERV OF PT,EA 15 MIN: HCPCS

## 2023-10-18 NOTE — HOME HEALTH
Patient states that she is doing pretty well at this time. States that she feels that she is getting around better but knows that she is still wea and needing her walker to get around. States that she has tried getting up on her own at times but unable to do so safely. Hoping to get to a point where she is able to walk without her walker and return back to her home

## 2023-10-19 ENCOUNTER — HOME CARE VISIT (OUTPATIENT)
Dept: HOME HEALTH SERVICES | Facility: CLINIC | Age: 83
End: 2023-10-19
Payer: MEDICARE

## 2023-10-19 VITALS
RESPIRATION RATE: 20 BRPM | DIASTOLIC BLOOD PRESSURE: 60 MMHG | HEART RATE: 59 BPM | OXYGEN SATURATION: 98 % | TEMPERATURE: 97.6 F | SYSTOLIC BLOOD PRESSURE: 112 MMHG

## 2023-10-19 PROCEDURE — G0300 HHS/HOSPICE OF LPN EA 15 MIN: HCPCS

## 2023-10-20 ENCOUNTER — HOME CARE VISIT (OUTPATIENT)
Dept: HOME HEALTH SERVICES | Facility: CLINIC | Age: 83
End: 2023-10-20
Payer: MEDICARE

## 2023-10-20 VITALS
HEART RATE: 76 BPM | DIASTOLIC BLOOD PRESSURE: 64 MMHG | SYSTOLIC BLOOD PRESSURE: 128 MMHG | TEMPERATURE: 98.4 F | RESPIRATION RATE: 20 BRPM | OXYGEN SATURATION: 96 %

## 2023-10-20 PROCEDURE — G0158 HHC OT ASSISTANT EA 15: HCPCS

## 2023-10-22 VITALS
TEMPERATURE: 96.3 F | HEART RATE: 45 BPM | OXYGEN SATURATION: 98 % | SYSTOLIC BLOOD PRESSURE: 130 MMHG | DIASTOLIC BLOOD PRESSURE: 60 MMHG | RESPIRATION RATE: 18 BRPM

## 2023-10-23 NOTE — CASE COMMUNICATION
"Patient had fallen a couple of weeks ago and went to the ER and had a splint placed on Left wrist.  You did have goals for FMC and GMC of L UE/ HAND but because this was a new \"fracture\"  I we spoke about putting those goals on hold.  The last couple visits I have started light FMC activities , moving  large pegs from one surface to another etc.  Patient is able to complete these but fatigues easily so I havent pushed her too hard mainl y focusing on Gross motor with moving the objects and grasping large bulky pegs.  I am having you scheduled to go back  to reassess since the fall put her back a couple of weeks.  She has done well with tub bench transfers and toilet transfers still at least SBA with clothing managagement and getting up from toilet due to not using that left hand to push self up from toilet. etc.   Thank you. "

## 2023-10-24 ENCOUNTER — HOME CARE VISIT (OUTPATIENT)
Dept: HOME HEALTH SERVICES | Facility: CLINIC | Age: 83
End: 2023-10-24
Payer: MEDICARE

## 2023-10-24 ENCOUNTER — HOME CARE VISIT (OUTPATIENT)
Dept: HOME HEALTH SERVICES | Facility: HOME HEALTHCARE | Age: 83
End: 2023-10-24
Payer: MEDICARE

## 2023-10-24 VITALS
SYSTOLIC BLOOD PRESSURE: 128 MMHG | OXYGEN SATURATION: 96 % | TEMPERATURE: 98.4 F | DIASTOLIC BLOOD PRESSURE: 76 MMHG | HEART RATE: 46 BPM | RESPIRATION RATE: 18 BRPM

## 2023-10-24 PROCEDURE — G0152 HHCP-SERV OF OT,EA 15 MIN: HCPCS

## 2023-10-24 NOTE — HOME HEALTH
OCCUPATIONAL THERAPY REASSESSMENT    REASON FOR REFERRAL: PATIENT IS 82 Y/O FEMALE HOSPITALIZED AT [facility] 9/21/23-9/27/23AFTER FALLING AND DOUND IN BATHROOM FLOOR AFTER ~36 HOURS. SHE WAS TREATED FOR TRAUMATIC RHABDOMYOLYSIS, HYPOKALEMIA, UTI, LEUKOCYTOSIS, A-FIB WITH RVR, RENAL FAILURE, OLD COMPRESSION FRACTURE T12, AND CVA WITH LEFT SIDED WEAKNESS. PATIENT WITH HISTORY OF FREQUENT FALLS. MRI SHOWED: acute infarct predominantly within the right frontal, parietal, and occipital lobes with a single small subcentimeter focus in the superior left frontal lobe.     ASSESSMENT: PATIENT HAS MADE GOOD PROGRESS WITH OT SKILLED SERVICES.  SHE HAS FULLY MET ALL STGS, LTGS #1, #3 AND PARTIALLY MET ALL OTHER GOALS. SHE IS EXPECTED TO MEET ALL OTHER GOALS BY DISCHARGE FROM OT SERVICES WITH REHAB POTENTIAL GOOD FOR GOALS BASED ON PLOF, MOTIVATION, AND SUPPORT.  SHE CONTINUES TO DEMONSTRATE IMPROVEMENTS WITH SAFETY AWARENESS DURING ADLS, REQUIRING LESS CUEING FOR SAFETY.  SHE CONTINUES TO IMPROVE HER LEFT UE STRENGTH, LEFT UE GROSS MOTOR COORDINATION.  FINE MOTOR COORDINATION AND LEFT HAND STRENGTHENING HAVE BEEN DELAYED DUE TO RECENT WRIST FRACTURE THAT OCCURRED 10/4/23 AND PLACED IN SPLINT.  SHE CONTINUES TO REQUIRE SKILLED OT SERVICES FOR SAETY EDUCATION/TRAINING, TO PROGRESS LEFT UE HEP FOR STRENGTHENING/COORDINATION AS SHE PROGRESSES, RETRAINING FOR LEFT AFFECTED UE GROSS MOTOR/FINE MOTOR COORDINATION AND LEFT UE STRENGTHENING.      COMMUNICATION / CARE COORDINATION: CASE COMMUNICATION NOTE TO RN CLINIAL LEADER AND  NOTIFYING OF OT REASSESSMENT/FURTHER VISIT SETS ESTABLISHED.       ANTICIPATED DISCHARGE PLAN: TO HOME/CAREGIVER  PATIENT/CAREGIVER AGREE WITH DISCHARGE PLAN: YES  NOTICE OF MEDICARE NON-COVERAGE GIVEN: N/A  HOME HEALTH CHANGE OF CARE NOTICE GIVEN: N/A     REASON FOR REASSESSMENT: 30 DAY REASSESSMENT   REASSESSMENT SUMMARY:   OBJECTIVE ASSESSMENT MEASURES / PATIENT'S CURRENT LEVEL OF PERFORMANCE COMPARED  WITH  STATUS AT EVALUATION:  LEVEL AT EVALUATION: AROM B UES: WFL. STRENGTH: RIGHT UE: 4+/5-5/5 GROSSLY THROUGHOUT. STRENGTH: LEFT UE: 4/5 GROSSLY THROUGHOUT, LEFT WRIST NT DUE TO RECENT FRACTURE.   /PINCH: RIGHT HAND DOMINANT, RIGHT  STRENGTH: 5/5 AND LEFT  STRENGTH: 4-/5.   FINE MOTOR COORDINATION: RIGHT UE: WFL AND LEFT UE: SEVERE DEFICIT.   NINE HOLE PEG TEST; 6 MINUTES AND 46 SECONDS   UPPER EXTREMITY GROSS MOTOR COORDINATION: RIGHT UE: WFL AND LEFT UE: MODERATE DEFICIT/75% ACCURACY.   SENSATION: INTACT R UE GROSSLY THROUGHOUT HOWEVER IMPAIRED LEFT UE SENSATION WITH COMPLAINTS OF NUMBNESS SINCE CVA  FUNCTIONAL MOBILITY: MIN(A) WITH RW  BATHING: MAX(A)  TOILETING: MAX(A)  UB DRESSING: MOD(A)  LB DRESSING: MAX(A)  FEEDING: SB(A) WITH SETUP ASSISTANCE REQUIRED  GROOMING: MIN(A)  FUNCTIONAL TRANSFERS: MOD(A)    CURRENT LEVEL:  AROM B UES: WFL. STRENGTH: RIGHT UE: 4+/5-5/5 GROSSLY THROUGHOUT. STRENGTH: LEFT UE: 3/5 GROSSLY THROUGHOUT.   /PINCH: RIGHT HAND DOMINANT, RIGHT  STRENGTH: 5/5 AND LEFT  STRENGTH: 3+/5.   FINE MOTOR COORDINATION: RIGHT UE: WFL AND LEFT UE: SEVERE DEFICIT.   NINE HOLE PEG TEST; 4 MINUTES AND 33 SECONDS   UPPER EXTREMITY GROSS MOTOR COORDINATION: RIGHT UE: WFL AND LEFT UE: SEVERE DEFICIT.   SENSATION: INTACT B UES GROSSLY THROUGHOUT.  DENIES NUMBNESS LEFT UE/HAND.  FUNCTIONAL MOBILITY: SB(A) WITH RW AND MINIMAL VERBAL CUES FOR SAFETY.  BATHING: MIN(A)  TOILETING: MIN-MOD(A)  UB DRESSING: MIN(A)  LB DRESSING: MIN(A)  FEEDING: SB(A) WITH SETUP ASSISTANCE REQUIRED  GROOMING: CG(A)  FUNCTIONAL TRANSFERS: SB-CG(A) WITH RW WITH VERBAL CUES FOR SAFETY.      [place] INDEX OF ACTIVITIES OF DAILY LIVING SCORE AT EVALUATION: 47/100  CURRENT [place] INDEX SCORE AT REASSESSMENT: 72/100     SUMMARY OF FUNCTIONAL IMPROVEMENTS TO DATE OR EXPLANATION FOR DELAYED IMPROVEMENTS:  PATIENT HAS MADE GOOD PROGRESS WITH OT SKILLED SERVICES AT THIS TIME.  SHE HAS INCREASED HER FUNCTIONAL  TRANSFER INDEPENDENCE TO TOILET AND SHOWER CHAIR FROM MOD(A) TO CG-SB(A).  SHE HAS INCREASED HER SAFETY AWARENESS DURING FUNCTIONAL TASKS/ADLS FROM REQUIRING CONSTANT CUEING TO REQUIRING ONLY MINIMAL CUEING.  SHE HAS INCREASED HER LEFT AFFECTED UE GROSS MOTOR COORDINATION FROM SEVERE DEFICIT TO MODERATE DEFICIT.  PATIENT DID FALL ON 10/4/23 ATTEMPTING TO GO TO BATHROOM DURING THE NIGHT WITHOUT ASSISTANCE AND SUSTAINED A LEFT WRIST FRACTURE.  THIS HAS DELAYED HER IMPROVEMENTS WITH LEFT HAND FINE MOTOR COORDINATION AND STRENGTH.        SUMMARY OF CONTINUING FUNCTIONAL DEFICITS:   -FALL RISK DURING ADLS  -DECREASED LEFT UE FINE AND GROSS MOTOR COORDINATION  -DECREASED LEFT UE STRENGTH  -DECREASED ADL INDEPENDENCE  -DECREASED FUNCTIONAL TRANSFERS FOR ADLS  -DECREASED SAFETY AWARENESS     PLANS:   PLAN OF CARE REMAINS APPROPRIATE: YES WITH UPGRADED PLAN OF CARE   REVISIONS NEEDED: UPGRADED PLAN OF CARE   RECEIPT OF NEW ORDERS FROM MD: YES     [facility] SUPERVISORY NOTE  Was this patient seen by a BURGESS during this 30 day period of care? YES

## 2023-10-26 ENCOUNTER — HOME CARE VISIT (OUTPATIENT)
Dept: HOME HEALTH SERVICES | Facility: CLINIC | Age: 83
End: 2023-10-26
Payer: MEDICARE

## 2023-10-26 VITALS
SYSTOLIC BLOOD PRESSURE: 114 MMHG | HEART RATE: 48 BPM | DIASTOLIC BLOOD PRESSURE: 74 MMHG | RESPIRATION RATE: 18 BRPM | TEMPERATURE: 98.5 F | OXYGEN SATURATION: 97 %

## 2023-10-26 VITALS
OXYGEN SATURATION: 97 % | DIASTOLIC BLOOD PRESSURE: 60 MMHG | RESPIRATION RATE: 18 BRPM | HEART RATE: 51 BPM | TEMPERATURE: 97.9 F | SYSTOLIC BLOOD PRESSURE: 128 MMHG

## 2023-10-26 PROCEDURE — G0157 HHC PT ASSISTANT EA 15: HCPCS

## 2023-10-26 PROCEDURE — G0299 HHS/HOSPICE OF RN EA 15 MIN: HCPCS

## 2023-10-30 ENCOUNTER — HOME CARE VISIT (OUTPATIENT)
Dept: HOME HEALTH SERVICES | Facility: CLINIC | Age: 83
End: 2023-10-30
Payer: MEDICARE

## 2023-10-30 PROCEDURE — G0158 HHC OT ASSISTANT EA 15: HCPCS

## 2023-10-31 ENCOUNTER — HOME CARE VISIT (OUTPATIENT)
Dept: HOME HEALTH SERVICES | Facility: CLINIC | Age: 83
End: 2023-10-31
Payer: MEDICARE

## 2023-10-31 VITALS
SYSTOLIC BLOOD PRESSURE: 122 MMHG | HEART RATE: 47 BPM | TEMPERATURE: 98 F | DIASTOLIC BLOOD PRESSURE: 70 MMHG | OXYGEN SATURATION: 97 % | RESPIRATION RATE: 18 BRPM

## 2023-10-31 PROCEDURE — G0157 HHC PT ASSISTANT EA 15: HCPCS

## 2023-11-02 VITALS
RESPIRATION RATE: 18 BRPM | SYSTOLIC BLOOD PRESSURE: 138 MMHG | DIASTOLIC BLOOD PRESSURE: 78 MMHG | TEMPERATURE: 98.6 F | HEART RATE: 60 BPM | OXYGEN SATURATION: 94 %

## 2023-11-08 ENCOUNTER — HOME CARE VISIT (OUTPATIENT)
Dept: HOME HEALTH SERVICES | Facility: CLINIC | Age: 83
End: 2023-11-08
Payer: MEDICARE

## 2023-11-08 VITALS
TEMPERATURE: 98.4 F | SYSTOLIC BLOOD PRESSURE: 136 MMHG | DIASTOLIC BLOOD PRESSURE: 60 MMHG | RESPIRATION RATE: 18 BRPM | HEART RATE: 51 BPM

## 2023-11-08 PROCEDURE — G0158 HHC OT ASSISTANT EA 15: HCPCS

## 2023-11-09 ENCOUNTER — HOME CARE VISIT (OUTPATIENT)
Dept: HOME HEALTH SERVICES | Facility: CLINIC | Age: 83
End: 2023-11-09
Payer: MEDICARE

## 2023-11-09 VITALS
TEMPERATURE: 98.1 F | SYSTOLIC BLOOD PRESSURE: 110 MMHG | OXYGEN SATURATION: 98 % | HEART RATE: 55 BPM | DIASTOLIC BLOOD PRESSURE: 62 MMHG | RESPIRATION RATE: 18 BRPM

## 2023-11-09 PROCEDURE — G0157 HHC PT ASSISTANT EA 15: HCPCS

## 2023-11-14 ENCOUNTER — HOME CARE VISIT (OUTPATIENT)
Dept: HOME HEALTH SERVICES | Facility: CLINIC | Age: 83
End: 2023-11-14
Payer: MEDICARE

## 2023-11-14 VITALS
OXYGEN SATURATION: 99 % | HEART RATE: 64 BPM | TEMPERATURE: 98.2 F | DIASTOLIC BLOOD PRESSURE: 60 MMHG | RESPIRATION RATE: 18 BRPM | SYSTOLIC BLOOD PRESSURE: 108 MMHG

## 2023-11-14 PROCEDURE — G0157 HHC PT ASSISTANT EA 15: HCPCS

## 2023-11-15 ENCOUNTER — HOME CARE VISIT (OUTPATIENT)
Dept: HOME HEALTH SERVICES | Facility: CLINIC | Age: 83
End: 2023-11-15
Payer: MEDICARE

## 2023-11-15 PROCEDURE — G0158 HHC OT ASSISTANT EA 15: HCPCS

## 2023-11-17 VITALS
TEMPERATURE: 98.6 F | DIASTOLIC BLOOD PRESSURE: 62 MMHG | OXYGEN SATURATION: 97 % | HEART RATE: 56 BPM | RESPIRATION RATE: 18 BRPM | SYSTOLIC BLOOD PRESSURE: 130 MMHG

## 2023-11-17 NOTE — CASE COMMUNICATION
Patient is doing much better, she has had no new falls and she is improving with left hand /UE FMC AND GMC.  Patient is still wearing a wrist brace and does not have her ortho appointment till the 27th.  The caregivers are doing very well and are independent with working with patient on activities for L hand/ue FMC/GMC  as well as standing tolerance.  They have also took precautions at night for patient such as having a personal alarm o n in case she gets up etc.  They are aware that this will most likely be the last visit unless there are new orders from the ortho. I did educate on using a tub bench and the patient did use one for a while but the switched to a tub chair to save space and they state she does fine with this, using supervison/sba to get into tub.  Thank you.

## 2023-11-22 ENCOUNTER — HOME CARE VISIT (OUTPATIENT)
Dept: HOME HEALTH SERVICES | Facility: CLINIC | Age: 83
End: 2023-11-22
Payer: MEDICARE

## 2023-11-22 VITALS
HEART RATE: 76 BPM | OXYGEN SATURATION: 98 % | TEMPERATURE: 98.2 F | RESPIRATION RATE: 18 BRPM | DIASTOLIC BLOOD PRESSURE: 68 MMHG | SYSTOLIC BLOOD PRESSURE: 122 MMHG

## 2023-11-22 PROCEDURE — G0152 HHCP-SERV OF OT,EA 15 MIN: HCPCS

## 2023-11-22 NOTE — HOME HEALTH
OCCUPATIONAL THERAPY DISCHARGE:    REASON FOR REFERRAL: PATIENT IS 84 Y/O FEMALE HOSPITALIZED AT [facility] 9/21/23-9/27/23AFTER FALLING AND DOUND IN BATHROOM FLOOR AFTER ~36 HOURS. SHE WAS TREATED FOR TRAUMATIC RHABDOMYOLYSIS, HYPOKALEMIA, UTI, LEUKOCYTOSIS, A-FIB WITH RVR, RENAL FAILURE, OLD COMPRESSION FRACTURE T12, AND CVA WITH LEFT SIDED WEAKNESS. PATIENT WITH HISTORY OF FREQUENT FALLS. MRI SHOWED: acute infarct predominantly within the right frontal, parietal, and occipital lobes with a single small subcentimeter focus in the superior left frontal lobe.     SUMMARY OF CARE: PATIENT MET ALL GOALS.  PATIENT PARTICIPATED IN SKILLED OT SERVICES FOR LEFT UE COORDINATION TRAINING, LEFT AFFECTED UE STRENGTHENING THER. EX., PATIENT/CAREGIVER EDUCATION FOR SAFETY/FALL PREVENTION DURING ADLS/FUNCTIONAL TASKS, FUNCTIONAL TRANSFER TRAINING FOR ADLS.  PATIENT IS SB(A) TO MODIFIED INDEPENDENT WITH ADLS AND FUNCTIONAL MOBILITY/TRANSFERS.     PRIOR VS CURRENT LEVEL:  PRIOR LEVEL:  AROM B UES: WFL. STRENGTH: RIGHT UE: 4+/5-5/5 GROSSLY THROUGHOUT.   STRENGTH: LEFT UE: 4/5 GROSSLY THROUGHOUT, LEFT WRIST NT DUE TO RECENT FRACTURE.   /PINCH: RIGHT HAND DOMINANT, RIGHT  STRENGTH: 5/5 AND LEFT  STRENGTH: 4-/5.   FINE MOTOR COORDINATION: RIGHT UE: WFL AND LEFT UE: SEVERE DEFICIT.   UPPER EXTREMITY GROSS MOTOR COORDINATION: RIGHT UE: WFL AND LEFT UE: MODERATE DEFICIT/75% ACCURACY.   SENSATION: INTACT R UE GROSSLY THROUGHOUT HOWEVER IMPAIRED LEFT UE SENSATION WITH COMPLAINTS OF NUMBNESS SINCE CVA  FUNCTIONAL MOBILITY: MIN(A) WITH RW   BATHING: MAX(A)   TOILETING: MAX(A)   UB DRESSING: MOD(A)   LB DRESSING: MAX(A)  FEEDING: SB(A) WITH SETUP ASSISTANCE REQUIRED   GROOMING: MIN(A)   FUNCTIONAL TRANSFERS: MOD(A)     AROM B UES: WFL. STRENGTH: RIGHT UE: 4+/5-5/5 GROSSLY THROUGHOUT.   STRENGTH: LEFT UE: 4+/5 GROSSLY THROUGHOUT, LEFT WRIST NT DUE TO RECENT FRACTURE.   /PINCH: RIGHT HAND DOMINANT, RIGHT  STRENGTH: 5/5 AND LEFT   STRENGTH: 4+/5.   FINE MOTOR COORDINATION: RIGHT UE: WFL AND LEFT UE: WFL  UPPER EXTREMITY GROSS MOTOR COORDINATION: RIGHT UE: WFL AND LEFT UE: WFL  SENSATION: INTACT R UE GROSSLY THROUGHOUT.  MILDLY IMPAIRED LEFT UE SENSATION WITH COMPLAINTS OF NUMBNESS SINCE CVA/IMPROVING  FUNCTIONAL MOBILITY: SB(A) WITH RW   BATHING: SB(A)  TOILETING: MODIFIED INDEPENDENT WITH SETUP   UB DRESSING:  MODIFIED INDEPENDENT WITH SETUP   LB DRESSING: MODIFIED INDEPENDENT WITH SETUP   FEEDING: MODIFIED INDEPENDENT WITH SETUP   GROOMING: MODIFIED INDEPENDENT WITH SETUP   FUNCTIONAL TRANSFERS: SB(A)      DISCHARGE STATUS: TO SELF/CAREGIVER     DISCHARGE CONDITION: GOOD     DISCHARGE REASON    GOALS MET        INSTRUCTIONS HOME PROGRAM PROVIDED TO: PATIENT/CAREGIVERS          CONTENT: LEFT UE STRENGTHENING AND COORDINATION HEP          PATIENT CAREGIVER LEVEL OF COMPLIANCE: EXCELLENT     UNMET NEEDS: NONE  SERVICES CONTINUING: NONE     COMMUNICATION / CARE COORDINATION:  CASE COMMUNICATION NOTE TO RN CLINICAL LEADER AND DIRECTOR NOTIFYING OF DISCHARGE FROM HOME HEALTH AND [facility] COMPLETE.          MEDICARE 2 DAY NOTICE GIVEN: YES     PATIENT AND CAREGIVER ARE AGREEABLE WITH DISCHARGE PLAN: YES